# Patient Record
Sex: FEMALE | Race: WHITE | NOT HISPANIC OR LATINO | Employment: PART TIME | URBAN - NONMETROPOLITAN AREA
[De-identification: names, ages, dates, MRNs, and addresses within clinical notes are randomized per-mention and may not be internally consistent; named-entity substitution may affect disease eponyms.]

---

## 2017-01-17 ENCOUNTER — TELEPHONE (OUTPATIENT)
Dept: BEHAVIORAL HEALTH | Facility: OTHER | Age: 27
End: 2017-01-17

## 2017-01-20 ENCOUNTER — TELEPHONE (OUTPATIENT)
Dept: BEHAVIORAL HEALTH | Facility: OTHER | Age: 27
End: 2017-01-20

## 2017-01-20 NOTE — TELEPHONE ENCOUNTER
[facility]  @FLOW([id number])@      Social Work Care Navigator Note      Patient: Myrna Martinez  Date: January 20, 2017  Preferred Name: Myrna    Previous PHQ-9:   PHQ-9 SCORE 3/18/2016 4/29/2016 11/9/2016   Total Score 13 9 6     Previous NICHOLE-7:   NICHOLE-7 SCORE 11/9/2016   Total Score 9     RAMO LEVEL:  RAMO Score (Last Two) 11/10/2016   RAMO Raw Score 27   Activation Score 47.4   RAMO Level 2       Preferred Contact: @MARION([id number])@  Type of Contact Today: Phone call (patient reached)      Data: (subjective / Objective):  Patient Goals Areas: @FLOW([id number])@  Patient Stated Goals: @FLOW([id number])@  Recent ED/IP Admission or Discharge?   None  Patient Goals Areas: @FLOW([id number])@  Patient Stated Goals: @FLOW([id number])@  Recent ED/IP Admission or Discharge?   None    Objectives Addressed Today:  SO has been helping more, Patient has not called for childcare help yet- gave her the number again, patient will make appointment with PCP.    Current Stressors / Issues:  Working on moving toys upstairs    Intervention:  Motivational Interviewing: Expressed Empathy/Understanding, Supported Autonomy, Collaboration, Evocation and Open-ended questions   Target Behavior(s): Explored and resolved challenges to attending appointments as scheduled    Assessment: (Progress on Goals / Homework):  As above- patient will make appt with PCP and call [facility] re:childcare help and SNAP    Plan: (Homework, other):  Patient was encouraged to continue to seek condition-related information and education.      Scheduled a Phone follow up appointment with INA RIGGINS in 1 week     Patient has set self-identified goals and will monitor progress until the next appointment on: 01/26/17.     Sejal Hyman, Social Work Care Coordinator

## 2017-02-07 ENCOUNTER — TELEPHONE (OUTPATIENT)
Dept: BEHAVIORAL HEALTH | Facility: OTHER | Age: 27
End: 2017-02-07

## 2017-02-10 ENCOUNTER — OFFICE VISIT (OUTPATIENT)
Dept: BEHAVIORAL HEALTH | Facility: OTHER | Age: 27
End: 2017-02-10
Payer: COMMERCIAL

## 2017-02-10 DIAGNOSIS — F48.9 DEFERRED DIAGNOSIS ON AXIS I: Primary | ICD-10-CM

## 2017-02-10 NOTE — PROGRESS NOTES
"[facility]         Social Work Care Navigator Note      Patient: Myrna Martinez  Date: February 10, 2017  Preferred Name: Myrna    Previous PHQ-9:   PHQ-9 SCORE 3/18/2016 4/29/2016 11/9/2016   Total Score 13 9 6     Previous NICHOLE-7:   NICHOLE-7 SCORE 11/9/2016   Total Score 9     RAMO LEVEL:  RAMO Score (Last Two) 11/10/2016   RAMO Raw Score 27   Activation Score 47.4   RAMO Level 2       Preferred Contact: @[facility]([id number])@  Type of Contact Today: Face to Face in Clinic      Data: (subjective / Objective):  Patient Goals Areas: Goal Areas: Education  Patient Stated Goals: Patient stated goals: \" I need to know how to parent, it is hard for me to know if I am too strict.\"  Recent ED/IP Admission or Discharge?   None  Patient Goals Areas: Goal Areas: Education  Patient Stated Goals: Patient stated goals: \" I need to know how to parent, it is hard for me to know if I am too strict.\"  Recent ED/IP Admission or Discharge?   None    Objectives Addressed Today:  Increased anxiety, wanting parenting resources    Current Stressors / Issues:  Having a hard time keeping track of things    Intervention:  Motivational Interviewing: Expressed Empathy/Understanding and Supported Autonomy, Collaboration, Evocation   Target Behavior(s): Explored and resolved challenges to attending appointments as scheduled    Assessment: (Progress on Goals / Homework): reminded her to call:  Kerrie [phone], and gave information for ECFE in [place] for parenting skills    Plan: (Homework, other): MAke phone calls  Patient was encouraged to continue to seek condition-related information and education.      Scheduled a Phone follow up appointment with INA RIGGINS in 1 week     Patient has set self-identified goals and will monitor progress until the next appointment on: 02/17/17.     Sejal Hyman, Social Work Care Coordinator                   "

## 2017-02-16 ENCOUNTER — TELEPHONE (OUTPATIENT)
Dept: BEHAVIORAL HEALTH | Facility: OTHER | Age: 27
End: 2017-02-16

## 2017-02-24 ENCOUNTER — TELEPHONE (OUTPATIENT)
Dept: BEHAVIORAL HEALTH | Facility: OTHER | Age: 27
End: 2017-02-24

## 2017-03-02 ENCOUNTER — TELEPHONE (OUTPATIENT)
Dept: BEHAVIORAL HEALTH | Facility: OTHER | Age: 27
End: 2017-03-02

## 2017-03-02 NOTE — TELEPHONE ENCOUNTER
[facility]  @FLOW([id number])@      Social Work Care Navigator Note      Patient: Myrna Martinez  Date: March 2, 2017  Preferred Name: Myrna    Previous PHQ-9:   PHQ-9 SCORE 3/18/2016 4/29/2016 11/9/2016   Total Score 13 9 6     Previous NICHOLE-7:   NICHOLE-7 SCORE 11/9/2016   Total Score 9     RAMO LEVEL:  RAMO Score (Last Two) 11/10/2016   RAMO Raw Score 27   Activation Score 47.4   RAMO Level 2       Preferred Contact: @MARION([id number])@  Type of Contact Today: Phone call (patient reached)      Data: (subjective / Objective):  Patient Goals Areas: @FLOW([id number])@  Patient Stated Goals: @FLOW([id number])@  Recent ED/IP Admission or Discharge?   None  Patient Goals Areas: @FLOW([id number])@  Patient Stated Goals: @FLOW([id number])@  Recent ED/IP Admission or Discharge?   None    Objectives Addressed Today:  Job application, using planners, and keeping her mind set on self and son.     Current Stressors / Issues:     Waiting for result of interview    Intervention:  Motivational Interviewing: Expressed Empathy/Understanding and Supported Autonomy, Collaboration, Evocation   Target Behavior(s): Explored current social supports and reinforced opportunities to increase engagement    Assessment: (Progress on Goals / Homework):  Continue use of planners    Plan: (Homework, other):  Patient was encouraged to continue to seek condition-related information and education.      Scheduled a Phone follow up appointment with INA RIGGINS in 2 weeks     Patient has set self-identified goals and will monitor progress until the next appointment on: 03/16/17.     Sejal Hyman, Social Work Care Coordinator               Next 5 appointments (look out 90 days)     Mar 10, 2017  1:15 PM CST   (Arrive by 1:00 PM)   Return Visit with Nolvia Ramos PA-C   [facility] ([facility])    [address] [place] [address]   [phone]

## 2017-03-10 ENCOUNTER — OFFICE VISIT (OUTPATIENT)
Dept: OTOLARYNGOLOGY | Facility: OTHER | Age: 27
End: 2017-03-10
Attending: PHYSICIAN ASSISTANT
Payer: COMMERCIAL

## 2017-03-10 VITALS
TEMPERATURE: 98.3 F | SYSTOLIC BLOOD PRESSURE: 116 MMHG | DIASTOLIC BLOOD PRESSURE: 72 MMHG | HEIGHT: 62 IN | WEIGHT: 145 LBS | BODY MASS INDEX: 26.68 KG/M2 | HEART RATE: 94 BPM

## 2017-03-10 DIAGNOSIS — H69.93 DYSFUNCTION OF EUSTACHIAN TUBE, BILATERAL: ICD-10-CM

## 2017-03-10 DIAGNOSIS — K21.9 LPRD (LARYNGOPHARYNGEAL REFLUX DISEASE): ICD-10-CM

## 2017-03-10 DIAGNOSIS — J30.89 SEASONAL ALLERGIC RHINITIS DUE TO OTHER ALLERGIC TRIGGER: Primary | ICD-10-CM

## 2017-03-10 DIAGNOSIS — J34.3 NASAL TURBINATE HYPERTROPHY: ICD-10-CM

## 2017-03-10 PROCEDURE — 99212 OFFICE O/P EST SF 10 MIN: CPT

## 2017-03-10 PROCEDURE — 99213 OFFICE O/P EST LOW 20 MIN: CPT | Mod: 25 | Performed by: PHYSICIAN ASSISTANT

## 2017-03-10 PROCEDURE — 31575 DIAGNOSTIC LARYNGOSCOPY: CPT | Performed by: PHYSICIAN ASSISTANT

## 2017-03-10 PROCEDURE — 31575 DIAGNOSTIC LARYNGOSCOPY: CPT

## 2017-03-10 RX ORDER — FLUTICASONE PROPIONATE 50 MCG
2 SPRAY, SUSPENSION (ML) NASAL DAILY
Qty: 1 BOTTLE | Refills: 11 | Status: SHIPPED | OUTPATIENT
Start: 2017-03-10 | End: 2017-04-12

## 2017-03-10 RX ORDER — OMEPRAZOLE 40 MG/1
40 CAPSULE, DELAYED RELEASE ORAL DAILY
Qty: 30 CAPSULE | Refills: 1 | Status: SHIPPED | OUTPATIENT
Start: 2017-03-10 | End: 2017-04-12

## 2017-03-10 ASSESSMENT — PAIN SCALES - GENERAL: PAINLEVEL: NO PAIN (0)

## 2017-03-10 NOTE — MR AVS SNAPSHOT
After Visit Summary   3/10/2017    Myrna Martinez    MRN: [id number]           Patient Information     Date Of Birth          1990        Visit Information        Provider Department      3/10/2017 1:15 PM Nolvia Ramos PA-C [facility]        Today's Diagnoses     Seasonal allergic rhinitis due to other allergic trigger    -  1    Nasal turbinate hypertrophy        Dysfunction of eustachian tube, bilateral        LPRD (laryngopharyngeal reflux disease)          Care Instructions    Start Flonase 2 sprays to each nostril daily  Return for skin testing and follow up    Consider septoplasty and turbinate reduction     Increase water intake.   Follow LPR instructions  STart Prilosec 40 mg one tablet daily      If there are concerns or questions, Call [phone] and ask for nurse    Adult lifestyle changes to prevent LPR reviewed      Avoid eating and drinking within two to three hours prior to bedtime    Do not drink alcohol    Eat small meals and slowly    Limit problem foods:    o Caffeine  o Carbonated drinks  o Chocolate  o Peppermint  o Tomato  o Citrus fruits  o Fatty and fried foods      Lose weight    Quit smoking    Wear loose clothing          Follow-ups after your visit        Who to contact     If you have questions or need follow up information about today's clinic visit or your schedule please contact [facility] directly at [phone].  Normal or non-critical lab and imaging results will be communicated to you by MyChart, letter or phone within 4 business days after the clinic has received the results. If you do not hear from us within 7 days, please contact the clinic through MyChart or phone. If you have a critical or abnormal lab result, we will notify you by phone as soon as possible.  Submit refill requests through [employer] or call your pharmacy and they will forward the refill request to us. Please allow 3 business days for your refill to be completed.  "         Additional Information About Your Visit        MyChart Information     [employer] lets you send messages to your doctor, view your test results, renew your prescriptions, schedule appointments and more. To sign up, go to [email or web address] . Click on \"Log in\" on the left side of the screen, which will take you to the Welcome page. Then click on \"Sign up Now\" on the right side of the page.     You will be asked to enter the access code listed below, as well as some personal information. Please follow the directions to create your username and password.     Your access code is: [email or web address]  Expires: 2017  1:38 PM     Your access code will  in 90 days. If you need help or a new code, please call your [facility] clinic or [phone].        Care EveryWhere ID     This is your Care EveryWhere ID. This could be used by other organizations to access your [facility] medical records  [id number]        Your Vitals Were     Pulse Temperature Height BMI (Body Mass Index)          94 98.3  F (36.8  C) (Tympanic) 5' 1.75\" (1.568 m) 26.74 kg/m2         Blood Pressure from Last 3 Encounters:   03/10/17 116/72   16 121/70   16 109/65    Weight from Last 3 Encounters:   03/10/17 145 lb (65.8 kg)   16 145 lb (65.8 kg)   16 154 lb (69.9 kg)              Today, you had the following     No orders found for display         Today's Medication Changes          These changes are accurate as of: 3/10/17  1:38 PM.  If you have any questions, ask your nurse or doctor.               Start taking these medicines.        Dose/Directions    fluticasone 50 MCG/ACT spray   Commonly known as:  FLONASE   Used for:  Dysfunction of eustachian tube, bilateral, Nasal turbinate hypertrophy, Seasonal allergic rhinitis due to other allergic trigger   Started by:  Nolvia Ramos PA-C        Dose:  2 spray   Spray 2 sprays into both nostrils daily   Quantity:  1 Bottle   Refills:  11       omeprazole 40 MG capsule "   Commonly known as:  priLOSEC   Used for:  LPRD (laryngopharyngeal reflux disease)   Started by:  Nolvia Ramos PA-C        Dose:  40 mg   Take 1 capsule (40 mg) by mouth daily Take 30-60 minutes before a meal.   Quantity:  30 capsule   Refills:  1            Where to get your medicines      These medications were sent to [id number] - [place], [place] - [address] AT [facility] [address], [place] [address]     Phone:  [phone]     fluticasone 50 MCG/ACT spray    omeprazole 40 MG capsule                Primary Care Provider Office Phone # Fax #    Nivia Jimenes [phone]       [facility] [place] [address]  [place] [address]        Thank you!     Thank you for choosing [facility]  for your care. Our goal is always to provide you with excellent care. Hearing back from our patients is one way we can continue to improve our services. Please take a few minutes to complete the written survey that you may receive in the mail after your visit with us. Thank you!             Your Updated Medication List - Protect others around you: Learn how to safely use, store and throw away your medicines at www.disposemymeds.org.          This list is accurate as of: 3/10/17  1:38 PM.  Always use your most recent med list.                   Brand Name Dispense Instructions for use    etonogestrel 68 MG Impl    IMPLANON/NEXPLANON     1 each by Subdermal route once       fluticasone 50 MCG/ACT spray    FLONASE    1 Bottle    Spray 2 sprays into both nostrils daily       omeprazole 40 MG capsule    priLOSEC    30 capsule    Take 1 capsule (40 mg) by mouth daily Take 30-60 minutes before a meal.       ranitidine 300 MG tablet    ZANTAC    30 tablet    Take 1 tablet (300 mg) by mouth At Bedtime       sertraline 50 MG tablet    ZOLOFT    30 tablet    Take 1 tablet (50 mg) by mouth daily

## 2017-03-10 NOTE — NURSING NOTE
"Chief Complaint   Patient presents with     Allergies     Follow Up Seasonal Allergic Rhinitis; the patient wants to be allergy tested       Initial /72 (Cuff Size: Adult Regular)  Pulse 94  Temp 98.3  F (36.8  C) (Tympanic)  Ht 5' 1.75\" (1.568 m)  Wt 145 lb (65.8 kg)  BMI 26.74 kg/m2 Estimated body mass index is 26.74 kg/(m^2) as calculated from the following:    Height as of this encounter: 5' 1.75\" (1.568 m).    Weight as of this encounter: 145 lb (65.8 kg).  Medication Reconciliation: complete   Sandee Villalobos      "

## 2017-03-10 NOTE — PROGRESS NOTES
Chief Complaint   Patient presents with     Allergies     Follow Up Seasonal Allergic Rhinitis; the patient wants to be allergy tested       Patient returns to get set up for MQT  She has sinus concerns and reports hx for past few years. She feels like everything is swollen in nose, and post nasal drainage and coughing drainage.   No nasal trauma or injury to nose. She has mild facial pain/ pressure in left side which started one day prior.   Myrna has no mary concerns with allergies. She has tried Zyrtec, and another allergy tablet. She was prescribed a nasal spray, but did not try use.   She had Sinus CT completed which was negative.   No allergy testing. Resides in a house with an unfinished basement. There is no water or mold. She has carpet in bedroom. She has on kitten at home (new).   No known family hx of allergies.     She has noticed increase in throat clearing. Daily heartburn. She has minimal water intake.       Past Medical History   Diagnosis Date     Anxiety      Chronic headache      Depression      [facility], was homeless at the time     Herpes genitalis in women      takes acyclovir     Seasonal allergies       No Known Allergies  Current Outpatient Prescriptions   Medication     ranitidine (ZANTAC) 300 MG tablet     cyclobenzaprine (FLEXERIL) 5 MG tablet     sertraline (ZOLOFT) 50 MG tablet     [DISCONTINUED] ValACYclovir HCl (VALTREX PO)     etonogestrel (IMPLANON/NEXPLANON) 68 MG IMPL     No current facility-administered medications for this visit.       ROS: 10 point ROS neg other than the symptoms noted above in the HPI.  General Appearance: healthy, alert, active and no distress  Head: Normocephalic. No masses, lesions, tenderness or abnormalities  Eyes: conjuctiva clear, PERRL, EOM intact  Ears: External ears normal. Canals clear. TM's normal. Left w/ pars tensa retraction.   Nose: Nares normal  Mouth: normal  Neck: Supple, no cervical adenopathy, no thyromegaly, Full range  of motion in all planes        The lips appear normal and without lesion. The dentition is in good condition The patient has a normal appearing and functioning hard and soft palate without bifid uvula or submucosal cleft. The tongue is normal in appearance without erosive lesion or fungating mass. The oral mucosa is soft and normal in appearance. The patient also has a soft floor of mouth and base of tongue. The tonsils are symmetric.      Flexible Endoscopy -     Attempts at mirror laryngoscopy were not possible due to gag reflex.  Therefore I proceeded with a fiberoptic examination.  First I sprayed both sides of the nose with a mixture of lidocaine and neosynephrine.  I then passed the scope through the nasal cavity. The nasal cavity was unremarkable.  DNS with Right TH. The nasopharynx was mucosally covered and symmetric.  The Eustachian tube openings were unobstructed.              ASSESSMENT:    ICD-10-CM    1. Seasonal allergic rhinitis due to other allergic trigger J30.89 fluticasone (FLONASE) 50 MCG/ACT spray   2. Nasal turbinate hypertrophy J34.3 fluticasone (FLONASE) 50 MCG/ACT spray   3. Dysfunction of eustachian tube, bilateral H69.83 fluticasone (FLONASE) 50 MCG/ACT spray   4. LPRD (laryngopharyngeal reflux disease) J38.7 omeprazole (PRILOSEC) 40 MG capsule   Start Flonase 2 sprays to each nostril daily  Return for skin testing and follow up    Consider septoplasty and turbinate reduction     Increase water intake.   Follow LPR instructions  STart Prilosec 40 mg one tablet daily        She had complete audiogram within normal range (June 2016)  Sinus CT was completed June 2016- overall negative, minimal thickening.     She did not complete MQT     Allergen avoidance measures were discussed and are important in preventing symptoms from occurring or worsening.   Follow up for allergy testing as recommended. This may be a blood test (mRAST) or skin testing ( modified quantitative testing).   Indications  for allergy testing include:   1) Confirm suspicion of allergic rhinitis due to inhalant allergies   2) Identify the offending allergen to determine specific mode of treatment   3) In the case of chronic rhinosinusitis: when symptoms are not controlled by avoidance and pharmacotherapy   4) In the Asthma patient when exacerbations may be due to perennial allergen exposure   5) Suspect food allergy   6) Otitis Media, chronic rhinitis, atopic dermatitis, Meniere disease, headache, pharyngitis or eye symptoms   Signed consent was obtained, and the risks of immunotherapy were discussed, including the potential for anaphylaxis.        Adult lifestyle changes to prevent LPR reviewed      Avoid eating and drinking within two to three hours prior to bedtime    Do not drink alcohol    Eat small meals and slowly    Limit problem foods:    o Caffeine  o Carbonated drinks  o Chocolate  o Peppermint  o Tomato  o Citrus fruits  o Fatty and fried foods      Lose weight    Quit smoking    Wear loose clothing    Thank you for allowing me to participate in the care of your patient.         Nolvia Ramos PA-C  ENT  [facility], [place]  [phone]

## 2017-03-10 NOTE — PATIENT INSTRUCTIONS
Start Flonase 2 sprays to each nostril daily  Return for skin testing and follow up    Consider septoplasty and turbinate reduction     Increase water intake.   Follow LPR instructions  STart Prilosec 40 mg one tablet daily      If there are concerns or questions, Call [phone] and ask for nurse    Adult lifestyle changes to prevent LPR reviewed      Avoid eating and drinking within two to three hours prior to bedtime    Do not drink alcohol    Eat small meals and slowly    Limit problem foods:    o Caffeine  o Carbonated drinks  o Chocolate  o Peppermint  o Tomato  o Citrus fruits  o Fatty and fried foods      Lose weight    Quit smoking    Wear loose clothing

## 2017-03-13 ENCOUNTER — TELEPHONE (OUTPATIENT)
Dept: ALLERGY | Facility: OTHER | Age: 27
End: 2017-03-13

## 2017-03-13 NOTE — TELEPHONE ENCOUNTER
I spoke with the patient today regarding allergy skin testing.  All general instructions are reviewed with the patient.  She is aware of all medications that need to be held prior to testing.  She will stop medications as directed per instructions.  She verbalized understanding and agree with plan.      An appointment will be arranged by the ENT [facility] for testing date and time. This message is forwarded to the [facility] to arrange for an appointment. Written instructions are mailed by the ENT [facility].  Sherrie Giraldo RN

## 2017-03-16 ENCOUNTER — TELEPHONE (OUTPATIENT)
Dept: BEHAVIORAL HEALTH | Facility: OTHER | Age: 27
End: 2017-03-16

## 2017-03-16 NOTE — TELEPHONE ENCOUNTER
[facility]  @FLOW([id number])@      Social Work Care Navigator Note      Patient: Myrna Martinez  Date: March 16, 2017  Preferred Name: Myrna    Previous PHQ-9:   PHQ-9 SCORE 3/18/2016 4/29/2016 11/9/2016   Total Score 13 9 6     Previous NICHOLE-7:   NICHOLE-7 SCORE 11/9/2016   Total Score 9     RAMO LEVEL:  RAMO Score (Last Two) 11/10/2016   RAMO Raw Score 27   Activation Score 47.4   RAMO Level 2       Preferred Contact: @MARION([id number])@  Type of Contact Today: Phone call (patient reached)      Data: (subjective / Objective):  Patient Goals Areas: @FLOW([id number])@  Patient Stated Goals: @FLOW([id number])@  Recent ED/IP Admission or Discharge?   None  Patient Goals Areas: @FLOW([id number])@  Patient Stated Goals: @FLOW([id number])@  Recent ED/IP Admission or Discharge?   None    Objectives Addressed Today:  Grandmother was in ICU  Did not get job  Has not inquired about early childhood education yet.     Current Stressors / Issues:  Grandmother in ICU    Intervention:  Motivational Interviewing: Expressed Empathy/Understanding, Supported Autonomy, Collaboration, Evocation, Open-ended questions and Reframe   Target Behavior(s): Explored thoughts and readiness to participate in individual therapy    Assessment: (Progress on Goals / Homework): Planner is helpful, and will continue to use it.      Plan: (Homework, other):Inquire about ECFE- call in 2 wks  Patient was encouraged to continue to seek condition-related information and education.      Scheduled a Phone follow up appointment with INA RIGGINS in 2 weeks     Patient has set self-identified goals and will monitor progress until the next appointment on: 03/30/17.   Sejal Hyman, Social Work Care Coordinator               Next 5 appointments (look out 90 days)     Mar 23, 2017  1:30 PM CDT   Office Visit with HC ALLERGY TESTING   [facility] ([facility])    [address] [place] [address]   [phone]            Mar 23, 2017  3:45 PM  CDT   (Arrive by 3:30 PM)   Return Visit with Nolvia Ramos PA-C   [facility] ([facility])    [address] [place] [address]   [phone]

## 2017-03-23 ENCOUNTER — OFFICE VISIT (OUTPATIENT)
Dept: OTOLARYNGOLOGY | Facility: OTHER | Age: 27
End: 2017-03-23
Attending: PHYSICIAN ASSISTANT
Payer: COMMERCIAL

## 2017-03-23 ENCOUNTER — OFFICE VISIT (OUTPATIENT)
Dept: ALLERGY | Facility: OTHER | Age: 27
End: 2017-03-23
Attending: PHYSICIAN ASSISTANT
Payer: COMMERCIAL

## 2017-03-23 VITALS
SYSTOLIC BLOOD PRESSURE: 119 MMHG | TEMPERATURE: 99.1 F | WEIGHT: 153 LBS | BODY MASS INDEX: 28.16 KG/M2 | DIASTOLIC BLOOD PRESSURE: 81 MMHG | HEART RATE: 80 BPM | HEIGHT: 62 IN | OXYGEN SATURATION: 100 %

## 2017-03-23 DIAGNOSIS — J30.2 SEASONAL ALLERGIC RHINITIS, UNSPECIFIED ALLERGIC RHINITIS TRIGGER: Primary | ICD-10-CM

## 2017-03-23 DIAGNOSIS — J34.3 NASAL TURBINATE HYPERTROPHY: ICD-10-CM

## 2017-03-23 DIAGNOSIS — H69.93 DYSFUNCTION OF EUSTACHIAN TUBE, BILATERAL: ICD-10-CM

## 2017-03-23 DIAGNOSIS — J30.2 SEASONAL ALLERGIC RHINITIS: Primary | ICD-10-CM

## 2017-03-23 DIAGNOSIS — K21.9 LPRD (LARYNGOPHARYNGEAL REFLUX DISEASE): ICD-10-CM

## 2017-03-23 PROCEDURE — 99213 OFFICE O/P EST LOW 20 MIN: CPT | Performed by: PHYSICIAN ASSISTANT

## 2017-03-23 PROCEDURE — 95004 PERQ TESTS W/ALRGNC XTRCS: CPT | Mod: TC

## 2017-03-23 PROCEDURE — 99212 OFFICE O/P EST SF 10 MIN: CPT | Mod: 25

## 2017-03-23 PROCEDURE — 95024 IQ TESTS W/ALLERGENIC XTRCS: CPT | Mod: TC

## 2017-03-23 RX ORDER — FEXOFENADINE HCL 180 MG/1
180 TABLET ORAL DAILY
Qty: 30 TABLET | Refills: 1 | Status: SHIPPED | OUTPATIENT
Start: 2017-03-23 | End: 2017-04-12

## 2017-03-23 ASSESSMENT — PAIN SCALES - GENERAL: PAINLEVEL: MILD PAIN (2)

## 2017-03-23 NOTE — MR AVS SNAPSHOT
After Visit Summary   3/23/2017    Myrna Martinez    MRN: [id number]           Patient Information     Date Of Birth          1990        Visit Information        Provider Department      3/23/2017 3:30 PM Nolvia Ramos PA-C [facility]        Today's Diagnoses     Seasonal allergic rhinitis, unspecified allergic rhinitis trigger    -  1      Care Instructions    Start Flonase 2 sprays to each nostril daily  Start Allegra one tablet daily or as needed     Consider septoplasty and turbinate reduction      Increase water intake.   Follow LPR instructions  STart Prilosec 40 mg one tablet daily    Follow up in 2 months         If there are concerns or questions, Call [phone] and ask for nurse     Adult lifestyle changes to prevent LPR reviewed      Avoid eating and drinking within two to three hours prior to bedtime    Do not drink alcohol    Eat small meals and slowly    Limit problem foods:    o Caffeine  o Carbonated drinks  o Chocolate  o Peppermint  o Tomato  o Citrus fruits  o Fatty and fried foods      Lose weight    Quit smoking    Wear loose clothing        Follow-ups after your visit        Follow-up notes from your care team     Return in about 2 months (around 5/23/2017).      Who to contact     If you have questions or need follow up information about today's clinic visit or your schedule please contact [facility] directly at [phone].  Normal or non-critical lab and imaging results will be communicated to you by MyChart, letter or phone within 4 business days after the clinic has received the results. If you do not hear from us within 7 days, please contact the clinic through MyChart or phone. If you have a critical or abnormal lab result, we will notify you by phone as soon as possible.  Submit refill requests through [employer] or call your pharmacy and they will forward the refill request to us. Please allow 3 business days for your refill to be  "completed.          Additional Information About Your Visit        [employer] Information     [employer] lets you send messages to your doctor, view your test results, renew your prescriptions, schedule appointments and more. To sign up, go to [email or web address] . Click on \"Log in\" on the left side of the screen, which will take you to the Welcome page. Then click on \"Sign up Now\" on the right side of the page.     You will be asked to enter the access code listed below, as well as some personal information. Please follow the directions to create your username and password.     Your access code is: [email or web address]  Expires: 2017  2:38 PM     Your access code will  in 90 days. If you need help or a new code, please call your [place] clinic or [phone].        Care EveryWhere ID     This is your Care EveryWhere ID. This could be used by other organizations to access your [place] medical records  [id number]        Your Vitals Were     Pulse Temperature Height BMI (Body Mass Index)          70 99.1  F (37.3  C) (Tympanic) 5' 2\" (1.575 m) 27.98 kg/m2         Blood Pressure from Last 3 Encounters:   17 123/67   03/10/17 116/72   16 121/70    Weight from Last 3 Encounters:   17 153 lb (69.4 kg)   03/10/17 145 lb (65.8 kg)   16 145 lb (65.8 kg)              Today, you had the following     No orders found for display         Today's Medication Changes          These changes are accurate as of: 3/23/17  3:43 PM.  If you have any questions, ask your nurse or doctor.               Start taking these medicines.        Dose/Directions    fexofenadine 180 MG tablet   Commonly known as:  ALLEGRA   Used for:  Seasonal allergic rhinitis, unspecified allergic rhinitis trigger   Started by:  Nolvia Ramos PA-C        Dose:  180 mg   Take 1 tablet (180 mg) by mouth daily   Quantity:  30 tablet   Refills:  1            Where to get your medicines      These medications were sent to [id number] - [place], [place] - [address] AT [facility] [address], [place] [address]     Phone:  [phone]     fexofenadine 180 MG tablet                Primary Care Provider Office Phone # Fax #    Nivia Jimenes [phone]       [facility] [place] [address]  [place] [address]        Thank you!     Thank you for choosing [facility]  for your care. Our goal is always to provide you with excellent care. Hearing back from our patients is one way we can continue to improve our services. Please take a few minutes to complete the written survey that you may receive in the mail after your visit with us. Thank you!             Your Updated Medication List - Protect others around you: Learn how to safely use, store and throw away your medicines at www.disposemymeds.org.          This list is accurate as of: 3/23/17  3:43 PM.  Always use your most recent med list.                   Brand Name Dispense Instructions for use    etonogestrel 68 MG Impl    IMPLANON/NEXPLANON     1 each by Subdermal route once       fexofenadine 180 MG tablet    ALLEGRA    30 tablet    Take 1 tablet (180 mg) by mouth daily       fluticasone 50 MCG/ACT spray    FLONASE    1 Bottle    Spray 2 sprays into both nostrils daily       omeprazole 40 MG capsule    priLOSEC    30 capsule    Take 1 capsule (40 mg) by mouth daily Take 30-60 minutes before a meal.       ranitidine 300 MG tablet    ZANTAC    30 tablet    Take 1 tablet (300 mg) by mouth At Bedtime       sertraline 50 MG tablet    ZOLOFT    30 tablet    Take 1 tablet (50 mg) by mouth daily

## 2017-03-23 NOTE — PATIENT INSTRUCTIONS
Start Flonase 2 sprays to each nostril daily  Start Allegra one tablet daily or as needed     Consider septoplasty and turbinate reduction      Increase water intake.   Follow LPR instructions  STart Prilosec 40 mg one tablet daily    Follow up in 2 months         If there are concerns or questions, Call [phone] and ask for nurse     Adult lifestyle changes to prevent LPR reviewed      Avoid eating and drinking within two to three hours prior to bedtime    Do not drink alcohol    Eat small meals and slowly    Limit problem foods:    o Caffeine  o Carbonated drinks  o Chocolate  o Peppermint  o Tomato  o Citrus fruits  o Fatty and fried foods      Lose weight    Quit smoking    Wear loose clothing

## 2017-03-23 NOTE — NURSING NOTE
"Chief Complaint   Patient presents with     other     MQT allergy skin testing       Initial /67 (BP Location: Right arm, Patient Position: Chair, Cuff Size: Adult Regular)  Pulse 70  Temp 99.1  F (37.3  C) (Tympanic)  Ht 5' 2\" (1.575 m)  Wt 153 lb (69.4 kg)  BMI 27.98 kg/m2 Estimated body mass index is 27.98 kg/(m^2) as calculated from the following:    Height as of this encounter: 5' 2\" (1.575 m).    Weight as of this encounter: 153 lb (69.4 kg).  Medication Reconciliation: complete     The patient presents for allergy skin testing.  She was scheduled to be tested several months ago, but had not stopped necessary medications.  So, her test was rescheduled to today.    Symptoms have included chronic post nasal drainage, facial pressure and pain, ears feeling plugged, chronic throat clearing.  Symptoms are worse during the winter season.      The patient lives in a single family home, witha basement.  The patient does suspect mold, water or moisture issues  in the home.  The home is located in [place] and is \"very old\"  There is carpet in the home, including in the bedroom.  There is forced air gas heat, and central air conditioning.       The patient has one cat that sleeps in the bedroom with her, but is not in the bed.      The patient denies allergy testing in the past.    All of the patients medications are reviewed prior to testing.  All appropriate medications have been stopped.         Consent is signed by the patient and signature is verified.    MQT/ID test is performed per protocol.  The patient became faint feeling just before the final 4 intradermal injections were given.  She  was placed on the floor.  She had some initial feeling of nausea that subsided when placed on the floor.  O2 was started at 3 liters per nasal cannula.  2 chewable Claritin were given to her.  ANH Lezama was called to the room and the patient was examined.  VS remained stable and are documented in the VS tabs. The " patient notes a fear of needles, and states she has become faint other times when she is given injections.     Due to her reaction, Helminthasporium intradermal was not done.  Cat, dog and dust intradermals were completed while the patient was laying down.   All symptoms resolved without further incident.  The patient had no further symptoms at the end of testing.      All findings are recorded on the paper flow sheet. Results are reviewed with the patient.  They are given written information regarding allergy.    The patient will follow-up with ANH Lezama for treatment plan.      Sherrie Giraldo RN

## 2017-03-23 NOTE — PROGRESS NOTES
"Chief Complaint   Patient presents with     other     MQT allergy skin testing       MQT  Dilution #5: Dust, Ragweed, lolita.   She did have a vasovagal symptoms during visit. However, tolerated well.   Past Medical History:   Diagnosis Date     Anxiety      Chronic headache      Depression     [facility], was homeless at the time     Herpes genitalis in women     takes acyclovir     Seasonal allergies       No Known Allergies  Current Outpatient Prescriptions   Medication     etonogestrel (IMPLANON/NEXPLANON) 68 MG IMPL     fluticasone (FLONASE) 50 MCG/ACT spray     omeprazole (PRILOSEC) 40 MG capsule     ranitidine (ZANTAC) 300 MG tablet     sertraline (ZOLOFT) 50 MG tablet     [DISCONTINUED] ValACYclovir HCl (VALTREX PO)     No current facility-administered medications for this visit.       ROS: 10 point ROS neg other than the symptoms noted above in the HPI.  /67 (BP Location: Right arm, Patient Position: Chair, Cuff Size: Adult Regular)  Pulse 70  Temp 99.1  F (37.3  C) (Tympanic)  Ht 5' 2\" (1.575 m)  Wt 153 lb (69.4 kg)  BMI 27.98 kg/m2  General Appearance: healthy, alert, active and no distress  Head: Normocephalic. No masses, lesions, tenderness or abnormalities  Eyes: conjuctiva clear, PERRL, EOM intact  Ears: External ears normal. Canals clear. TM's normal. Left w/ pars tensa retraction.   Nose: Nares normal  Mouth: normal  Neck: Supple, no cervical adenopathy, no thyromegaly, Full range of motion in all planes    Heart Regular rate  Lungs: clear anterior and posterior.     ASSESSMENT:    ICD-10-CM    1. Seasonal allergic rhinitis, unspecified allergic rhinitis trigger J30.2 fexofenadine (ALLEGRA) 180 MG tablet   2. Nasal turbinate hypertrophy J34.3    3. Dysfunction of eustachian tube, bilateral H69.83    4. LPRD (laryngopharyngeal reflux disease) J38.7        Start Flonase 2 sprays to each nostril daily  Start Allegra daily     Consider septoplasty and turbinate reduction      Increase " water intake.   Follow LPR instructions  STart Prilosec 40 mg one tablet daily      Return for recheck of LPR in 2 months     She had complete audiogram within normal range (June 2016)  Sinus CT was completed June 2016- overall negative, minimal thickening.     Adult lifestyle changes to prevent LPR reviewed      Avoid eating and drinking within two to three hours prior to bedtime    Do not drink alcohol    Eat small meals and slowly    Limit problem foods:    o Caffeine  o Carbonated drinks  o Chocolate  o Peppermint  o Tomato  o Citrus fruits  o Fatty and fried foods      Lose weight    Quit smoking    Wear loose clothing    Nolvia Ramos PA-C  ENT  [facility], [place]  [phone]

## 2017-03-23 NOTE — PROGRESS NOTES
"The patient presents for allergy skin testing.  She was scheduled to be tested several months ago, but had not stopped necessary medications.  So, her test was rescheduled to today.    Symptoms have included chronic post nasal drainage, facial pressure and pain, ears feeling plugged, chronic throat clearing.  Symptoms are worse during the winter season.      The [facility]  The patient does suspect mold, water or moisture issues  in the home.  The home is located in [place] and is \"very old\"  There is carpet in the home, including in the bedroom.  There is forced air gas heat, and central air conditioning.       The patient has one cat that sleeps in the bedroom with her, but is not in the bed.      The patient denies allergy testing in the past.    All of the patients medications are reviewed prior to testing.  All appropriate medications have been stopped.         Consent is signed by the patient and signature is verified.    MQT/ID test is performed per protocol.  The patient became faint feeling just before the final 4 intradermal injections were given.  She  was placed on the floor.  She had some initial feeling of nausea that subsided when placed on the floor.  O2 was started at 3 liters per nasal cannula.  2 chewable Claritin were given to her.  ANH Lezama was called to the room and the patient was examined.  VS remained stable and are documented in the VS tabs. The patient notes a fear of needles, and states she has become faint other times when she is given injections.     Due to her reaction, Helminthasporium intradermal was not done.  Cat, dog and dust intradermals were completed while the patient was laying down.   All symptoms resolved without further incident.  The patient had no further symptoms at the end of testing.      All findings are recorded on the paper flow sheet. Results are reviewed with the patient.  They are given written information " regarding allergy.    The patient will follow-up with ANH Lezama for treatment plan.          Sherrie Giraldo RN

## 2017-03-23 NOTE — MR AVS SNAPSHOT
"              After Visit Summary   3/23/2017    Myrna Martinez    MRN: [id number]           Patient Information     Date Of Birth          1990        Visit Information        Provider Department      3/23/2017 1:30 PM HC ALLERGY TESTING [facility]        Today's Diagnoses     Seasonal allergic rhinitis    -  1       Follow-ups after your visit        Who to contact     If you have questions or need follow up information about today's clinic visit or your schedule [facility] at [phone].  Normal or non-critical lab and imaging results will be communicated to you by MyChart, letter or phone within 4 business days after the clinic has received the results. If you do not hear from us within 7 days, please contact the clinic through [employer] or phone. If you have a critical or abnormal lab result, we will notify you by phone as soon as possible.  Submit refill requests through [employer] or call your pharmacy and they will forward the refill request to us. Please allow 3 business days for your refill to be completed.          Additional Information About Your Visit        MyChart Information     [employer] lets you send messages to your doctor, view your test results, renew your prescriptions, schedule appointments and more. To sign up, go to [email or web address] . Click on \"Log in\" on the left side of the screen, which will take you to the Welcome page. Then click on \"Sign up Now\" on the right side of the page.     You will be asked to enter the access code listed below, as well as some personal information. Please follow the directions to create your username and password.     Your access code is: [email or web address]  Expires: 2017  2:38 PM     Your access code will  in 90 days. If you need help or a new code, please call your [facility] or [phone].        Care EveryWhere ID     This is your Care EveryWhere ID. This could be used by other " organizations to access your [place] medical records  [id number]         Blood Pressure from Last 3 Encounters:   03/23/17 123/67   03/10/17 116/72   11/09/16 121/70    Weight from Last 3 Encounters:   03/23/17 153 lb (69.4 kg)   03/10/17 145 lb (65.8 kg)   11/09/16 145 lb (65.8 kg)              Today, you had the following     No orders found for display         Today's Medication Changes          These changes are accurate as of: 3/23/17  4:26 PM.  If you have any questions, ask your nurse or doctor.               Start taking these medicines.        Dose/Directions    fexofenadine 180 MG tablet   Commonly known as:  ALLEGRA   Used for:  Seasonal allergic rhinitis, unspecified allergic rhinitis trigger   Started by:  Nolvia Ramos PA-C        Dose:  180 mg   Take 1 tablet (180 mg) by mouth daily   Quantity:  30 tablet   Refills:  1            Where to get your medicines      These medications were sent to [facility], [place] - [address] AT [facility] [address], [place] [address]     Phone:  [phone]     fexofenadine 180 MG tablet                Primary Care Provider Office Phone # Fax #    Nivia Jimenes [phone]       [facility] [address]  [place] [address]        Thank you!     Thank you for choosing [facility]  for your care. Our goal is always to provide you with excellent care. Hearing back from our patients is one way we can continue to improve our services. Please take a few minutes to complete the written survey that you may receive in the mail after your visit with us. Thank you!             Your Updated Medication List - Protect others around you: Learn how to safely use, store and throw away your medicines at www.disposemymeds.org.          This list is accurate as of: 3/23/17  4:26 PM.  Always use your most recent med list.                   Brand Name Dispense Instructions for use     etonogestrel 68 MG Impl    IMPLANON/NEXPLANON     1 each by Subdermal route once       fexofenadine 180 MG tablet    ALLEGRA    30 tablet    Take 1 tablet (180 mg) by mouth daily       fluticasone 50 MCG/ACT spray    FLONASE    1 Bottle    Spray 2 sprays into both nostrils daily       omeprazole 40 MG capsule    priLOSEC    30 capsule    Take 1 capsule (40 mg) by mouth daily Take 30-60 minutes before a meal.       ranitidine 300 MG tablet    ZANTAC    30 tablet    Take 1 tablet (300 mg) by mouth At Bedtime       sertraline 50 MG tablet    ZOLOFT    30 tablet    Take 1 tablet (50 mg) by mouth daily

## 2017-04-12 ENCOUNTER — OFFICE VISIT (OUTPATIENT)
Dept: BEHAVIORAL HEALTH | Facility: OTHER | Age: 27
End: 2017-04-12
Attending: SOCIAL WORKER
Payer: COMMERCIAL

## 2017-04-12 ENCOUNTER — OFFICE VISIT (OUTPATIENT)
Dept: FAMILY MEDICINE | Facility: OTHER | Age: 27
End: 2017-04-12
Attending: FAMILY MEDICINE
Payer: COMMERCIAL

## 2017-04-12 VITALS
HEIGHT: 62 IN | HEART RATE: 64 BPM | BODY MASS INDEX: 27.97 KG/M2 | SYSTOLIC BLOOD PRESSURE: 118 MMHG | DIASTOLIC BLOOD PRESSURE: 62 MMHG | TEMPERATURE: 98.5 F | WEIGHT: 152 LBS

## 2017-04-12 DIAGNOSIS — K21.9 GASTROESOPHAGEAL REFLUX DISEASE WITHOUT ESOPHAGITIS: ICD-10-CM

## 2017-04-12 DIAGNOSIS — R11.0 NAUSEA: Primary | ICD-10-CM

## 2017-04-12 DIAGNOSIS — F41.1 GAD (GENERALIZED ANXIETY DISORDER): Primary | ICD-10-CM

## 2017-04-12 DIAGNOSIS — F41.1 GAD (GENERALIZED ANXIETY DISORDER): ICD-10-CM

## 2017-04-12 DIAGNOSIS — Z63.0 RELATIONSHIP PROBLEM BETWEEN PARTNERS: ICD-10-CM

## 2017-04-12 DIAGNOSIS — F33.1 MAJOR DEPRESSIVE DISORDER, RECURRENT EPISODE, MODERATE (H): ICD-10-CM

## 2017-04-12 DIAGNOSIS — R45.86 MOOD SWINGS: ICD-10-CM

## 2017-04-12 LAB — HCG UR QL: NEGATIVE

## 2017-04-12 PROCEDURE — 99212 OFFICE O/P EST SF 10 MIN: CPT

## 2017-04-12 PROCEDURE — 99214 OFFICE O/P EST MOD 30 MIN: CPT | Performed by: FAMILY MEDICINE

## 2017-04-12 PROCEDURE — 36415 COLL VENOUS BLD VENIPUNCTURE: CPT | Performed by: FAMILY MEDICINE

## 2017-04-12 PROCEDURE — 81025 URINE PREGNANCY TEST: CPT | Performed by: FAMILY MEDICINE

## 2017-04-12 PROCEDURE — 85027 COMPLETE CBC AUTOMATED: CPT | Performed by: FAMILY MEDICINE

## 2017-04-12 PROCEDURE — 90840 PSYTX CRISIS EA ADDL 30 MIN: CPT | Performed by: SOCIAL WORKER

## 2017-04-12 PROCEDURE — 90839 PSYTX CRISIS INITIAL 60 MIN: CPT | Mod: U2 | Performed by: SOCIAL WORKER

## 2017-04-12 PROCEDURE — 84443 ASSAY THYROID STIM HORMONE: CPT | Performed by: FAMILY MEDICINE

## 2017-04-12 RX ORDER — PRENATAL VIT/IRON FUM/FOLIC AC 27MG-0.8MG
1 TABLET ORAL DAILY
Qty: 100 TABLET | Refills: 3 | Status: SHIPPED | OUTPATIENT
Start: 2017-04-12 | End: 2018-05-10

## 2017-04-12 RX ORDER — HYDROXYZINE PAMOATE 25 MG/1
25 CAPSULE ORAL 3 TIMES DAILY PRN
Qty: 40 CAPSULE | Refills: 1 | Status: SHIPPED | OUTPATIENT
Start: 2017-04-12 | End: 2017-05-23

## 2017-04-12 SDOH — SOCIAL STABILITY - SOCIAL INSECURITY: PROBLEMS IN RELATIONSHIP WITH SPOUSE OR PARTNER: Z63.0

## 2017-04-12 ASSESSMENT — ANXIETY QUESTIONNAIRES
7. FEELING AFRAID AS IF SOMETHING AWFUL MIGHT HAPPEN: MORE THAN HALF THE DAYS
1. FEELING NERVOUS, ANXIOUS, OR ON EDGE: NEARLY EVERY DAY
3. WORRYING TOO MUCH ABOUT DIFFERENT THINGS: NEARLY EVERY DAY
IF YOU CHECKED OFF ANY PROBLEMS ON THIS QUESTIONNAIRE, HOW DIFFICULT HAVE THESE PROBLEMS MADE IT FOR YOU TO DO YOUR WORK, TAKE CARE OF THINGS AT HOME, OR GET ALONG WITH OTHER PEOPLE: EXTREMELY DIFFICULT
6. BECOMING EASILY ANNOYED OR IRRITABLE: NEARLY EVERY DAY
5. BEING SO RESTLESS THAT IT IS HARD TO SIT STILL: MORE THAN HALF THE DAYS
2. NOT BEING ABLE TO STOP OR CONTROL WORRYING: NEARLY EVERY DAY
GAD7 TOTAL SCORE: 18

## 2017-04-12 ASSESSMENT — PATIENT HEALTH QUESTIONNAIRE - PHQ9: 5. POOR APPETITE OR OVEREATING: MORE THAN HALF THE DAYS

## 2017-04-12 ASSESSMENT — PAIN SCALES - GENERAL: PAINLEVEL: NO PAIN (0)
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
## 2017-04-12 NOTE — MR AVS SNAPSHOT
After Visit Summary   4/12/2017    Myrna Martinez    MRN: [id number]           Patient Information     Date Of Birth          1990        Visit Information        Provider Department      4/12/2017 11:30 AM Sandee Waller, [facility]        Today's Diagnoses     NICHOLE (generalized anxiety disorder)    -  1    Major depressive disorder, recurrent episode, moderate (H)           Follow-ups after your visit        Additional Services     MENTAL HEALTH REFERRAL       Your provider has referred you to: [facility] Carrie [place] [phone]   [email or web address]    All scheduling is subject to the client's specific insurance plan & benefits, provider/location availability, and provider clinical specialities.  Please arrive 15 minutes early for your first appointment and bring your completed paperwork.    Please be aware that coverage of these services is subject to the terms and limitations of your health insurance plan.  Call member services at your health plan with any benefit or coverage questions.                  Future tests that were ordered for you today     Open Future Orders        Priority Expected Expires Ordered    TSH with free T4 reflex Routine  4/12/2018 4/12/2017    CBC with platelets Routine  4/12/2018 4/12/2017            Who to contact     If you have questions or need follow up information about today's clinic visit or your schedule please contact [facility] [place] directly at [phone].  Normal or non-critical lab and imaging results will be communicated to you by MyChart, letter or phone within 4 business days after the clinic has received the results. If you do not hear from us within 7 days, please contact the clinic through MyChart or phone. If you have a critical or abnormal lab result, we will notify you by phone as soon as possible.  Submit refill requests through [place] [employer] or  "call your pharmacy and they will forward the refill request to us. Please allow 3 business days for your refill to be completed.          Additional Information About Your Visit        MyChart Information     [employer] lets you send messages to your doctor, view your test results, renew your prescriptions, schedule appointments and more. To sign up, go to [email or web address] . Click on \"Log in\" on the left side of the screen, which will take you to the Welcome page. Then click on \"Sign up Now\" on the right side of the page.     You will be asked to enter the access code listed below, as well as some personal information. Please follow the directions to create your username and password.     Your access code is: [email or web address]  Expires: 2017  2:38 PM     Your access code will  in 90 days. If you need help or a new code, please call your [place] clinic or [phone].        Care EveryWhere ID     This is your Care EveryWhere ID. This could be used by other organizations to access your [place] medical records  [id number]         Blood Pressure from Last 3 Encounters:   17 118/62   17 119/81   03/10/17 116/72    Weight from Last 3 Encounters:   17 152 lb (68.9 kg)   17 153 lb (69.4 kg)   03/10/17 145 lb (65.8 kg)              We Performed the Following     MENTAL HEALTH REFERRAL          Today's Medication Changes          These changes are accurate as of: 17  3:31 PM.  If you have any questions, ask your nurse or doctor.               Start taking these medicines.        Dose/Directions    hydrOXYzine 25 MG capsule   Commonly known as:  VISTARIL   Used for:  Mood swings (H), NICHOLE (generalized anxiety disorder)   Started by:  Nivia Jimenes MD        Dose:  25 mg   Take 1 capsule (25 mg) by mouth 3 times daily as needed for anxiety   Quantity:  40 capsule   Refills:  1       prenatal multivitamin  plus iron 27-0.8 MG Tabs per tablet   Used for:  NICHOLE (generalized anxiety " disorder), Mood swings (H)   Started by:  Nivia Jimenes MD        Dose:  1 tablet   Take 1 tablet by mouth daily   Quantity:  100 tablet   Refills:  3         Stop taking these medicines if you haven't already. Please contact your care team if you have questions.     fexofenadine 180 MG tablet   Commonly known as:  ALLEGRA   Stopped by:  Nivia Jimenes MD           fluticasone 50 MCG/ACT spray   Commonly known as:  FLONASE   Stopped by:  Nivia Jimenes MD           omeprazole 40 MG capsule   Commonly known as:  priLOSEC   Stopped by:  Nivia Jimenes MD                Where to get your medicines      These medications were sent to [id number] - [place] - [address] AT [facility] [address], [place] [address]     Phone:  [phone]     hydrOXYzine 25 MG capsule    prenatal multivitamin  plus iron 27-0.8 MG Tabs per tablet    ranitidine 300 MG tablet    sertraline 50 MG tablet                Primary Care Provider Office Phone # Fax #    Nivia Jimenes [phone]       [facility] [place] [address] [place], [place] [address] [address]        Thank you!     Thank you for choosing [facility]  for your care. Our goal is always to provide you with excellent care. Hearing back from our patients is one way we can continue to improve our services. Please take a few minutes to complete the written survey that you may receive in the mail after your visit with us. Thank you!             Your Updated Medication List - Protect others around you: Learn how to safely use, store and throw away your medicines at www.disposemymeds.org.          This list is accurate as of: 4/12/17  3:31 PM.  Always use your most recent med list.                   Brand Name Dispense Instructions for use    etonogestrel 68 MG Impl    IMPLANON/NEXPLANON     1 each by Subdermal route once       hydrOXYzine 25 MG capsule    VISTARIL    40 capsule    Take 1  capsule (25 mg) by mouth 3 times daily as needed for anxiety       prenatal multivitamin  plus iron 27-0.8 MG Tabs per tablet     100 tablet    Take 1 tablet by mouth daily       ranitidine 300 MG tablet    ZANTAC    30 tablet    Take 1 tablet (300 mg) by mouth At Bedtime       sertraline 50 MG tablet    ZOLOFT    30 tablet    Take 1 tablet (50 mg) by mouth daily

## 2017-04-12 NOTE — NURSING NOTE
"Chief Complaint   Patient presents with     LAB REQUEST     Depression       Initial /62  Pulse 64  Temp 98.5  F (36.9  C) (Tympanic)  Ht 5' 2\" (1.575 m)  Wt 152 lb (68.9 kg)  BMI 27.8 kg/m2 Estimated body mass index is 27.8 kg/(m^2) as calculated from the following:    Height as of this encounter: 5' 2\" (1.575 m).    Weight as of this encounter: 152 lb (68.9 kg).  Medication Reconciliation: complete   Pushpa Eli    "

## 2017-04-12 NOTE — MR AVS SNAPSHOT
After Visit Summary   4/12/2017    Myrna Martinez    MRN: [id number]           Patient Information     Date Of Birth          1990        Visit Information        Provider Department      4/12/2017 11:15 AM Nivia Jimenes MD [facility] [place]        Today's Diagnoses     Nausea    -  1    Mood swings (H)        NICHOLE (generalized anxiety disorder)        Gastroesophageal reflux disease without esophagitis        Major depressive disorder, recurrent episode, moderate (H)        Relationship problem between partners           Follow-ups after your visit        Additional Services     MENTAL HEALTH REFERRAL       Your provider has referred you to: [facility] Carrie [facility] [phone]   [email or web address]    Please be aware that coverage of these services is subject to the terms and limitations of your health insurance plan.  Call member services at your health plan with any benefit or coverage questions.      Please bring the following to your appointment:  >>   Any x-rays, CTs or MRIs which have been performed.  Contact the facility where they were done to arrange for  prior to your scheduled appointment.  Any new CT, MRI or other procedures ordered by your specialist must be performed at a [facility] facility or coordinated by your clinic's referral office.    >>   List of current medications   >>   This referral request   >>   Any documents/labs given to you for this referral                  Future tests that were ordered for you today     Open Future Orders        Priority Expected Expires Ordered    TSH with free T4 reflex Routine  4/12/2018 4/12/2017    CBC with platelets Routine  4/12/2018 4/12/2017            Who to contact     If you have questions or need follow up information about today's clinic visit or your schedule please contact [facility] [place] directly at [phone].  Normal or  "non-critical lab and imaging results will be communicated to you by MyChart, letter or phone within 4 business days after the clinic has received the results. If you do not hear from us within 7 days, please contact the clinic through [employer] or phone. If you have a critical or abnormal lab result, we will notify you by phone as soon as possible.  Submit refill requests through [employer] or call your pharmacy and they will forward the refill request to us. Please allow 3 business days for your refill to be completed.          Additional Information About Your Visit        [employer] Information     [employer] lets you send messages to your doctor, view your test results, renew your prescriptions, schedule appointments and more. To sign up, go to [email or web address] . Click on \"Log in\" on the left side of the screen, which will take you to the Welcome page. Then click on \"Sign up Now\" on the right side of the page.     You will be asked to enter the access code listed below, as well as some personal information. Please follow the directions to create your username and password.     Your access code is: [email or web address]  Expires: 2017  2:38 PM     Your access code will  in 90 days. If you need help or a new code, please call your [place] clinic or [phone].        Care EveryWhere ID     This is your Care EveryWhere ID. This could be used by other organizations to access your [place] medical records  [id number]        Your Vitals Were     Pulse Temperature Height BMI (Body Mass Index)          64 98.5  F (36.9  C) (Tympanic) 5' 2\" (1.575 m) 27.8 kg/m2         Blood Pressure from Last 3 Encounters:   17 118/62   17 119/81   03/10/17 116/72    Weight from Last 3 Encounters:   17 152 lb (68.9 kg)   17 153 lb (69.4 kg)   03/10/17 145 lb (65.8 kg)              We Performed the Following     HCG qualitative urine     MENTAL HEALTH REFERRAL          Today's Medication Changes          These " changes are accurate as of: 4/12/17 12:46 PM.  If you have any questions, ask your nurse or doctor.               Start taking these medicines.        Dose/Directions    hydrOXYzine 25 MG capsule   Commonly known as:  VISTARIL   Used for:  Mood swings (H), NICHOLE (generalized anxiety disorder)   Started by:  Nivia Jimenes MD        Dose:  25 mg   Take 1 capsule (25 mg) by mouth 3 times daily as needed for anxiety   Quantity:  40 capsule   Refills:  1       prenatal multivitamin  plus iron 27-0.8 MG Tabs per tablet   Used for:  NICHOLE (generalized anxiety disorder), Mood swings (H)   Started by:  Nivia Jimenes MD        Dose:  1 tablet   Take 1 tablet by mouth daily   Quantity:  100 tablet   Refills:  3         Stop taking these medicines if you haven't already. Please contact your care team if you have questions.     fexofenadine 180 MG tablet   Commonly known as:  ALLEGRA   Stopped by:  Nivia Jimenes MD           fluticasone 50 MCG/ACT spray   Commonly known as:  FLONASE   Stopped by:  Nivia Jimenes MD           omeprazole 40 MG capsule   Commonly known as:  priLOSEC   Stopped by:  Nivia Jimenes MD                Where to get your medicines      These medications were sent to [id number]  [place] - [address] AT [facility] [address], [facility] [address]     Phone:  [phone]     hydrOXYzine 25 MG capsule    prenatal multivitamin  plus iron 27-0.8 MG Tabs per tablet    ranitidine 300 MG tablet    sertraline 50 MG tablet                Primary Care Provider Office Phone # Fax #    Nivia Jimenes [phone]       [facility] [place] [address]  [place] [address]        Thank you!     Thank you for choosing [facility]  for your care. Our goal is always to provide you with excellent care. Hearing back from our patients is one way we can continue to improve our services. Please take a few minutes to  complete the written survey that you may receive in the mail after your visit with us. Thank you!             Your Updated Medication List - Protect others around you: Learn how to safely use, store and throw away your medicines at www.disposemymeds.org.          This list is accurate as of: 4/12/17 12:46 PM.  Always use your most recent med list.                   Brand Name Dispense Instructions for use    etonogestrel 68 MG Impl    IMPLANON/NEXPLANON     1 each by Subdermal route once       hydrOXYzine 25 MG capsule    VISTARIL    40 capsule    Take 1 capsule (25 mg) by mouth 3 times daily as needed for anxiety       prenatal multivitamin  plus iron 27-0.8 MG Tabs per tablet     100 tablet    Take 1 tablet by mouth daily       ranitidine 300 MG tablet    ZANTAC    30 tablet    Take 1 tablet (300 mg) by mouth At Bedtime       sertraline 50 MG tablet    ZOLOFT    30 tablet    Take 1 tablet (50 mg) by mouth daily

## 2017-04-12 NOTE — PROGRESS NOTES
[facility]  April 12, 2017      Behavioral Health Clinician Progress Note    Patient Name: Myrna Martinez           Service Type: Individual      Service Location:   Face to Face in Clinic     Session Start Time: 11:50  Session End Time: 1:30      Session Length: 90      Attendees: Client    Visit Activities (Refresh list every visit): NEW, [facility] Only and [address] Crisis Visit with ED Admission Averted    Diagnostic Assessment Date: 11/17/16  Treatment Plan Review Date: 4/12/17 - 7/12/17  See Flowsheets for today's PHQ-9 and NICHOLE-7 results  Previous PHQ-9:   PHQ-9 SCORE 4/29/2016 11/9/2016 4/12/2017   Total Score 9 6 17     Previous NICHOLE-7:   NICHOLE-7 SCORE 11/9/2016 4/12/2017   Total Score 9 18       RAMO LEVEL:  RAMO Score (Last Two) 11/10/2016   RAMO Raw Score 27   Activation Score 47.4   RAMO Level 2       DATA  Extended Session (60+ minutes): PROLONGED SERVICE IN THE OUTPATIENT SETTING REQUIRING DIRECT (FACE-TO-FACE) PATIENT CONTACT BEYOND THE USUAL SERVICE:    - Longer session due to limited access to mental health appointments and necessity to address patient's distress / complexity    - Patient's presenting concerns require more intensive intervention than could be completed within the usual service    - High distress and under complex circumstances.  See Data section for details  Interactive Complexity: Yes, visit entailed Interactive Complexity evidenced by:  -The need to manage maladaptive communication (related to, e.g., high anxiety, high reactivity, repeated questions, or disagreement) among participants that complicates delivery of care  Crisis: Yes, visit entailed Crisis Management / Stabilization requiring urgent assessment and history of the crisis state, mental status exam and disposition  -Presenting problem with life threatening or complex issues that required immediate attention to a patient in high distress    Treatment Objective(s) Addressed in This Session:  Target  "Behavior(s): symptom management associated with mental health/ anxiety.    Anxiety: will experience a reduction in anxiety, will develop more effective coping skills to manage anxiety symptoms, will develop healthy cognitive patterns and beliefs and will increase ability to function adaptively    Current Stressors / Issues:  Patient reports having significant anxiety lately.  Feeling emotional overload with life responsibilities and relationship stressors.  Feels conflicted about working on keeping relationship or taking a break from her partner for a while.    Allowed patient to release her emotions and express herself in a safe and therapeutic environment.    Reviewed coping skills.  Does not know many coping skills.  Reports deep breathing through her nose is difficult because of physical issues.  Does do deep breathing to the best of her abilities.  Discussed reframing thoughts and mindfulness techniques of building her safe place/ escape.  Also reviewed \"be the pebble\" technique.    Reviewed [facility] goal and updated this goal to reflect current life situation.  Refer to flow sheet for goal.    Progress on Treatment Objective(s) / Homework:  New Objective established this session - PRECONTEMPLATION (Not seeing need for change); Intervened by educating the patient about the effects of current behavior on health.  Evoked information about reasons to continue behavior, express concern / recommendations, and explored any change talk    Motivational Interviewing    MI Intervention: Expressed Empathy/Understanding, Supported Autonomy, Collaboration, Evocation, Permission to raise concern or advise, Open-ended questions, Reflections: simple and complex, Change talk (evoked) and Reframe     Change Talk Expressed by the Patient: Desire to change Ability to change Reasons to change Need to change    Provider Response to Change Talk: E - Evoked more info from patient about behavior change, A - Affirmed patient's thoughts, " decisions, or attempts at behavior change, R - Reflected patient's change talk and S - Summarized patient's change talk statements    Also provided psychoeducation about behavioral health condition, symptoms, and treatment options    Care Plan review completed: Yes    Medication Review:  Changes to psychiatric medications, see updated Medication List in EPIC.     Medication Compliance:  was just prescribed medications.    Changes in Health Issues:   Yes: ENT/ sinus issues- states unable to breathe through nose.  Can not smell.    Chemical Use Review:   Substance Use: Chemical use reviewed, no active concerns identified      Tobacco Use: No current tobacco use.      Assessment: Current Emotional / Mental Status (status of significant symptoms):  Risk status (Self / Other harm or suicidal ideation)  Patient has had a history of suicidal ideation: 1x previously about 2 years ago. and suicide attempts: x1  Patient denies current fears or concerns for personal safety.  Patient denies current or recent suicidal ideation or behaviors.  Patient denies current or recent homicidal ideation or behaviors.  Patient denies current or recent self injurious behavior or ideation.  Patient denies other safety concerns.  Patient reports there are firearms in the house. The firearms are secured in a locked space  A safety and risk management plan has not been developed at this time, however client was given the after-hours number / 911 should there be a change in any of these risk factors.    Appearance:   Appropriate   Eye Contact:   Good   Psychomotor Behavior: Agitated  Restless   Attitude:   Cooperative   Orientation:   All  Speech   Rate / Production: Normal    Volume:  Normal   Mood:    Angry  Anxious  Sad   Affect:    Expansive   Thought Content:  Clear   Thought Form:  Coherent  Logical   Insight:    Good     Diagnoses:  1. NICHOLE (generalized anxiety disorder)    2. Major depressive disorder, recurrent episode, moderate (H)         Collateral Reports Completed:  Referral made to counceling department for couples counseling.    Plan: (Homework, other):  Patient was given information about behavioral services and encouraged to schedule a follow up appointment with the clinic [facility] in 1 week.  She was also given Cognitive Behavioral Therapy skills to practice when experiencing anxiety.  CD Recommendations: No indications of CD issues.  Sandee Waller [facility], [facility]      ______________________________________________________________________    Integrated Primary Care Behavioral Health Treatment Plan    Patient's Name: Myrna Martinez  YOB: 1990    Date: April 12, 2017      DSM-V Diagnoses: 311 Other/unspec. Depressive Disorder  300.02 (F41.1) Generalized Anxiety Disorder  Psychosocial / Contextual Factors: health issues, limited social support, mental health symptoms, occupational / vocational stress and relationship stress  WHODAS: 23    Referral / Collaboration:  The following referral(s) will be initiated: Issa Hess for family counseling.    Anticipated number of session or this episode of care: 24    MeasurableTreatment Goal(s) related to diagnosis / functional impairment(s)  Goal 1: Patient will focus on improving herself and decreasing her anxiety.    I will know I've met my goal when I feel happy and my anxiety is less.      Objective #A (Patient Action)    Patient will use cognitive strategies identified in therapy to challenge anxious thoughts  practice coping skills learned in therapy to help reduce anxiety.  Status: New - Date: 4/12/17     Intervention(s)  [facility] will provide appropriate theraputic relationship in which patient can release emotions and stressors in a safe place.  teach CBT skills.    Patient has reviewed and agreed to the above plan.      Sandee Waller, [facility]  April 12, 2017

## 2017-04-13 ASSESSMENT — ANXIETY QUESTIONNAIRES: GAD7 TOTAL SCORE: 18

## 2017-04-13 ASSESSMENT — PATIENT HEALTH QUESTIONNAIRE - PHQ9: SUM OF ALL RESPONSES TO PHQ QUESTIONS 1-9: 17

## 2017-04-17 ENCOUNTER — TELEPHONE (OUTPATIENT)
Dept: BEHAVIORAL HEALTH | Facility: OTHER | Age: 27
End: 2017-04-17

## 2017-04-17 NOTE — TELEPHONE ENCOUNTER
Patient called in crisis over relationship issues.  Processed concerns and de-escelated patient's stress.  Spoke with patient on the phone for a while.  Answered questions.  Patient felt relief and was able to end phone call and go on with her day.    Sandee Waller, MSW, [facility], [facility]

## 2017-04-19 ENCOUNTER — OFFICE VISIT (OUTPATIENT)
Dept: BEHAVIORAL HEALTH | Facility: OTHER | Age: 27
End: 2017-04-19
Attending: SOCIAL WORKER
Payer: COMMERCIAL

## 2017-04-19 DIAGNOSIS — F41.1 GAD (GENERALIZED ANXIETY DISORDER): Primary | ICD-10-CM

## 2017-04-19 DIAGNOSIS — F33.1 MAJOR DEPRESSIVE DISORDER, RECURRENT EPISODE, MODERATE (H): ICD-10-CM

## 2017-04-19 PROCEDURE — 90837 PSYTX W PT 60 MINUTES: CPT | Performed by: SOCIAL WORKER

## 2017-04-19 NOTE — PROGRESS NOTES
[facility]  April 19, 2017    Behavioral Health Clinician Progress Note    Patient Name: Myrna Martinez           Service Type: Individual      Service Location:   Face to Face in Clinic     Session Start Time: 11:00  Session End Time: 12:00      Session Length: 53 - 60      Attendees: Client    Visit Activities (Refresh list every visit): [facility] Only    Diagnostic Assessment Date: 11/17/16  Treatment Plan Review Date: 4/12/17 - 7/12/17  See Flowsheets for today's PHQ-9 and NICHOLE-7 results  Previous PHQ-9:   PHQ-9 SCORE 4/29/2016 11/9/2016 4/12/2017   Total Score 9 6 17     Previous NICHOLE-7:   NICHOLE-7 SCORE 11/9/2016 4/12/2017   Total Score 9 18       RAMO LEVEL:  RAMO Score (Last Two) 11/10/2016   RAMO Raw Score 27   Activation Score 47.4   RAMO Level 2     DATA  Extended Session (60+ minutes): No  Interactive Complexity: No  Crisis: No    Treatment Objective(s) Addressed in This Session:  Target Behavior(s): symptom management associated with mental health/ anxiety.    Anxiety: will experience a reduction in anxiety, will develop more effective coping skills to manage anxiety symptoms, will develop healthy cognitive patterns and beliefs and will increase ability to function adaptively    Current Stressors / Issues:  Continues to have relationship stress with significant other.  Is contemplating not moving into a new house her significant other is buying.  Wanting to get an apartment and live by herself for a while.    Reviewed coping skills.  States that talking in therapy helps.  Has been thinking a lot about her situation and is able to come to an appropriate conclusion.  Does not feel like her thoughts are racing, but is able to  evaluate each situation appropriately.    Progress on Treatment Objective(s) / Homework:  Minimal progress - PREPARATION (Decided to change - considering how); Intervened by negotiating a change plan and determining options / strategies for behavior change,  identifying triggers, exploring social supports, and working towards setting a date to begin behavior change    Motivational Interviewing    MI Intervention: Expressed Empathy/Understanding, Supported Autonomy, Collaboration, Evocation, Permission to raise concern or advise, Open-ended questions, Reflections: simple and complex, Change talk (evoked) and Reframe     Change Talk Expressed by the Patient: Desire to change Ability to change Reasons to change Need to change    Provider Response to Change Talk: E - Evoked more info from patient about behavior change, A - Affirmed patient's thoughts, decisions, or attempts at behavior change, R - Reflected patient's change talk and S - Summarized patient's change talk statements    Also provided psychoeducation about behavioral health condition, symptoms, and treatment options    Care Plan review completed: No    Medication Review:  No changes to current psychiatric medication(s)    Medication Compliance:  was just prescribed medications.    Changes in Health Issues:   Yes: ENT/ sinus issues- states unable to breathe through nose.  Can not smell.    Chemical Use Review:   Substance Use: Chemical use reviewed, no active concerns identified   Reports that she had been using marijuana, but has since quit.     Tobacco Use: No current tobacco use.      Assessment: Current Emotional / Mental Status (status of significant symptoms):  Risk status (Self / Other harm or suicidal ideation)  Patient has had a history of suicidal ideation: 1x previously about 2 years ago. and suicide attempts: x1  Patient denies current fears or concerns for personal safety.  Patient denies current or recent suicidal ideation or behaviors.  Patient denies current or recent homicidal ideation or behaviors.  Patient denies current or recent self injurious behavior or ideation.  Patient denies other safety concerns.  Patient reports there are firearms in the house. The firearms are secured in a locked  space  A safety and risk management plan has not been developed at this time, however client was given the after-hours number / 911 should there be a change in any of these risk factors.    Appearance:   Appropriate   Eye Contact:   Good   Psychomotor Behavior: Agitated  Restless   Attitude:   Cooperative   Orientation:   All  Speech   Rate / Production: Normal    Volume:  Normal   Mood:    Anxious  Sad   Affect:    Appropriate   Thought Content:  Clear   Thought Form:  Coherent  Logical   Insight:    Good     Diagnoses:  1. NICHOLE (generalized anxiety disorder)    2. Major depressive disorder, recurrent episode, moderate (H)      Collateral Reports Completed:  Referral made to counceling department for couples counseling.    Plan: (Homework, other):  Patient was given information about behavioral services and encouraged to schedule a follow up appointment with the clinic [facility] in 1 week.  She was also given Cognitive Behavioral Therapy skills to practice when experiencing anxiety.  CD Recommendations: No indications of CD issues.  Sandee Waller, [facility], [facility]

## 2017-04-19 NOTE — MR AVS SNAPSHOT
"              After Visit Summary   4/19/2017    Myrna Martinez    MRN: [id number]           Patient Information     Date Of Birth          1990        Visit Information        Provider Department      4/19/2017 11:00 AM Sandee Waller [facility]        Today's Diagnoses     NICHOLE (generalized anxiety disorder)    -  1    Major depressive disorder, recurrent episode, moderate (H)           Follow-ups after your visit        Your next 10 appointments already scheduled     Apr 24, 2017  9:30 AM CDT   (Arrive by 9:15 AM)   Return Visit with Sandee Waller [facility] ([facility])    [address] [place] [id number] [phone]              Who to contact     If you have questions or need follow up information about today's clinic visit or your schedule please contact [facility] directly at [phone].  Normal or non-critical lab and imaging results will be communicated to you by MyChart, letter or phone within 4 business days after the clinic has received the results. If you do not hear from us within 7 days, please contact the clinic through MyChart or phone. If you have a critical or abnormal lab result, we will notify you by phone as soon as possible.  Submit refill requests through [employer] or call your pharmacy and they will forward the refill request to us. Please allow 3 business days for your refill to be completed.          Additional Information About Your Visit        MyChart Information     [employer] lets you send messages to your doctor, view your test results, renew your prescriptions, schedule appointments and more. To sign up, go to [email or web address] . Click on \"Log in\" on the left side of the screen, which will take you to the Welcome page. Then click on \"Sign up Now\" on the right side of the page.     You will be asked to enter the access code listed below, as well as some personal information. Please follow " the directions to create your username and password.     Your access code is: [email or web address]  Expires: 2017  2:38 PM     Your access code will  in 90 days. If you need help or a new code, please call your [place] clinic or [phone].        Care EveryWhere ID     This is your Care EveryWhere ID. This could be used by other organizations to access your [place] medical records  [id number]         Blood Pressure from Last 3 Encounters:   17 118/62   17 119/81   03/10/17 116/72    Weight from Last 3 Encounters:   17 152 lb (68.9 kg)   17 153 lb (69.4 kg)   03/10/17 145 lb (65.8 kg)              Today, you had the following     No orders found for display       Primary Care Provider Office Phone # Fax #    Nivia Jimenes [phone]       [facility] [place] [address]  [place] [address]        Thank you!     Thank you for choosing [facility] [place]  for your care. Our goal is always to provide you with excellent care. Hearing back from our patients is one way we can continue to improve our services. Please take a few minutes to complete the written survey that you may receive in the mail after your visit with us. Thank you!             Your Updated Medication List - Protect others around you: Learn how to safely use, store and throw away your medicines at www.disposemymeds.org.          This list is accurate as of: 17  1:20 PM.  Always use your most recent med list.                   Brand Name Dispense Instructions for use    etonogestrel 68 MG Impl    IMPLANON/NEXPLANON     1 each by Subdermal route once       hydrOXYzine 25 MG capsule    VISTARIL    40 capsule    Take 1 capsule (25 mg) by mouth 3 times daily as needed for anxiety       prenatal multivitamin  plus iron 27-0.8 MG Tabs per tablet     100 tablet    Take 1 tablet by mouth daily       ranitidine 300 MG tablet    ZANTAC    30 tablet    Take 1 tablet (300 mg) by mouth At Bedtime        sertraline 50 MG tablet    ZOLOFT    30 tablet    Take 1 tablet (50 mg) by mouth daily

## 2017-04-20 ENCOUNTER — TELEPHONE (OUTPATIENT)
Dept: BEHAVIORAL HEALTH | Facility: OTHER | Age: 27
End: 2017-04-20

## 2017-04-20 NOTE — TELEPHONE ENCOUNTER
"[facility]  @FLOW([id number])@      Social Work Care Navigator Note      Patient: Myrna Martinez  Date: April 20, 2017  Preferred Name: Myrna    Previous PHQ-9:   PHQ-9 SCORE 4/29/2016 11/9/2016 4/12/2017   Total Score 9 6 17     Previous NICHOLE-7:   NICHOLE-7 SCORE 11/9/2016 4/12/2017   Total Score 9 18     RAMO LEVEL:  RAMO Score (Last Two) 11/10/2016   RAMO Raw Score 27   Activation Score 47.4   RAMO Level 2       Preferred Contact: @MARION([id number])@  Type of Contact Today: Phone call (not reached/unavailable)      Data: (subjective / Objective):  Patient Goals Areas: @FLOW([id number])@  Patient Stated Goals: To write down  tasks , appt,  dates and times  for less stre-  ss and worry in  thoughts.\"  Recent ED/IP Admission or Discharge?   None  Attempted to reach patient, but was unsuccessful.  Plan to attempt again.  Sejal Hyman        Next 5 appointments (look out 90 days)     Apr 24, 2017  9:30 AM CDT   (Arrive by 9:15 AM)   Return Visit with Sandee Waller, [facility] ([facility])    [address] [place] [address] [phone]                "

## 2017-04-21 ENCOUNTER — TELEPHONE (OUTPATIENT)
Dept: BEHAVIORAL HEALTH | Facility: OTHER | Age: 27
End: 2017-04-21

## 2017-04-23 ENCOUNTER — AMBULATORY - HEALTHEAST (OUTPATIENT)
Dept: MULTI SPECIALTY CLINIC | Facility: CLINIC | Age: 27
End: 2017-04-23

## 2017-04-23 LAB
HPV_EXT - HISTORICAL: NORMAL
PAP SMEAR - HIM PATIENT REPORTED: NORMAL

## 2017-04-24 ENCOUNTER — OFFICE VISIT (OUTPATIENT)
Dept: BEHAVIORAL HEALTH | Facility: OTHER | Age: 27
End: 2017-04-24
Attending: SOCIAL WORKER
Payer: COMMERCIAL

## 2017-04-24 DIAGNOSIS — F41.1 GAD (GENERALIZED ANXIETY DISORDER): Primary | ICD-10-CM

## 2017-04-24 DIAGNOSIS — F33.1 MAJOR DEPRESSIVE DISORDER, RECURRENT EPISODE, MODERATE (H): ICD-10-CM

## 2017-04-24 PROCEDURE — 90832 PSYTX W PT 30 MINUTES: CPT | Performed by: SOCIAL WORKER

## 2017-04-24 NOTE — MR AVS SNAPSHOT
"              After Visit Summary   4/24/2017    Myrna Martinez    MRN: [id number]           Patient Information     Date Of Birth          1990        Visit Information        Provider Department      4/24/2017 9:30 AM Sandee Waller [facility]        Today's Diagnoses     NICHOLE (generalized anxiety disorder)    -  1    Major depressive disorder, recurrent episode, moderate (H)           Follow-ups after your visit        Your next 10 appointments already scheduled     May 02, 2017  9:00 AM CDT   (Arrive by 8:45 AM)   Return Visit with Sandee Waller [facility] ([facility])    [address] [facility] [id number] [phone]            Jun 06, 2017 10:30 AM CDT   (Arrive by 10:15 AM)   New Visit with DAVID Hammer   [facility] [place] [facility] ([facility])    [address] [place] [address] [phone]              Who to contact     If you have questions or need follow up information about today's clinic visit or your schedule please contact [facility] directly at [phone].  Normal or non-critical lab and imaging results will be communicated to you by MyChart, letter or phone within 4 business days after the clinic has received the results. If you do not hear from us within 7 days, please contact the clinic through [email or web address] or phone. If you have a critical or abnormal lab result, we will notify you by phone as soon as possible.  Submit refill requests through [employer] or call your pharmacy and they will forward the refill request to us. Please allow 3 business days for your refill to be completed.          Additional Information About Your Visit        MyChart Information     [employer] lets you send messages to your doctor, view your test results, renew your prescriptions, schedule appointments and more. To sign up, go to [email or web address] . Click on \"Log in\" on the left side of the screen, which " "will take you to the Welcome page. Then click on \"Sign up Now\" on the right side of the page.     You will be asked to enter the access code listed below, as well as some personal information. Please follow the directions to create your username and password.     Your access code is: [email or web address]  Expires: 2017  2:38 PM     Your access code will  in 90 days. If you need help or a new code, please call your [place] clinic or [phone].        Care EveryWhere ID     This is your Care EveryWhere ID. This could be used by other organizations to access your [place] medical records  [id number]         Blood Pressure from Last 3 Encounters:   17 118/62   17 119/81   03/10/17 116/72    Weight from Last 3 Encounters:   17 152 lb (68.9 kg)   17 153 lb (69.4 kg)   03/10/17 145 lb (65.8 kg)              Today, you had the following     No orders found for display       Primary Care Provider Office Phone # Fax #    Nivia Jimenes [phone]       [facility] [place] [address] [place] [address]  [place] [address]        Thank you!     Thank you for choosing [facility] [place]  for your care. Our goal is always to provide you with excellent care. Hearing back from our patients is one way we can continue to improve our services. Please take a few minutes to complete the written survey that you may receive in the mail after your visit with us. Thank you!             Your Updated Medication List - Protect others around you: Learn how to safely use, store and throw away your medicines at www.disposemymeds.org.          This list is accurate as of: 17 11:31 AM.  Always use your most recent med list.                   Brand Name Dispense Instructions for use    etonogestrel 68 MG Impl    IMPLANON/NEXPLANON     1 each by Subdermal route once       hydrOXYzine 25 MG capsule    VISTARIL    40 capsule    Take 1 capsule (25 mg) by mouth 3 times daily as needed for anxiety       " prenatal multivitamin  plus iron 27-0.8 MG Tabs per tablet     100 tablet    Take 1 tablet by mouth daily       ranitidine 300 MG tablet    ZANTAC    30 tablet    Take 1 tablet (300 mg) by mouth At Bedtime       sertraline 50 MG tablet    ZOLOFT    30 tablet    Take 1 tablet (50 mg) by mouth daily

## 2017-04-24 NOTE — PROGRESS NOTES
[facility]  April 24, 2017    Behavioral Health Clinician Progress Note    Patient Name: Myrna Martinez           Service Type: Individual      Service Location:   Face to Face in Clinic     Session Start Time: 9:00  Session End Time: 9:30      Session Length: 16 - 37      Attendees: Client    Visit Activities (Refresh list every visit): [facility] Only    Diagnostic Assessment Date: 11/17/16  Treatment Plan Review Date: 4/12/17 - 7/12/17  See Flowsheets for today's PHQ-9 and NICHOLE-7 results  Previous PHQ-9:   PHQ-9 SCORE 4/29/2016 11/9/2016 4/12/2017   Total Score 9 6 17     Previous NICHOLE-7:   NICHOLE-7 SCORE 11/9/2016 4/12/2017   Total Score 9 18       RAMO LEVEL:  RAMO Score (Last Two) 11/10/2016   RAMO Raw Score 27   Activation Score 47.4   RAMO Level 2     DATA  Extended Session (60+ minutes): No  Interactive Complexity: No  Crisis: No    Treatment Objective(s) Addressed in This Session:  Target Behavior(s): symptom management associated with mental health/ anxiety.    Anxiety: will experience a reduction in anxiety, will develop more effective coping skills to manage anxiety symptoms, will develop healthy cognitive patterns and beliefs and will increase ability to function adaptively    Current Stressors / Issues:  Patient reports she is talking more openly in her relationship.  Reflects that her partner is seeking help and was started on medications, which patient expresses relief over.  Reports she is standing firm in her decision to not move into the house that her partner recently purchased.  Will stay with her mom until she gets an apartment of her own.    Reviewed coping skills- continuing to use them as they are helpful for her.  Did not add any new skills at this appointment, reviewed and encouraged continued use of ones she has been building.    Progress on Treatment Objective(s) / Homework:  Minimal progress - ACTION (Actively working towards change); Intervened by reinforcing change  plan / affirming steps taken    Motivational Interviewing    MI Intervention: Expressed Empathy/Understanding, Supported Autonomy, Collaboration, Evocation, Permission to raise concern or advise, Open-ended questions, Reflections: simple and complex, Change talk (evoked) and Reframe     Change Talk Expressed by the Patient: Desire to change Ability to change Reasons to change Need to change    Provider Response to Change Talk: E - Evoked more info from patient about behavior change, A - Affirmed patient's thoughts, decisions, or attempts at behavior change, R - Reflected patient's change talk and S - Summarized patient's change talk statements    Also provided psychoeducation about behavioral health condition, symptoms, and treatment options    Care Plan review completed: No    Medication Review:  No changes to current psychiatric medication(s)    Medication Compliance:  was just prescribed medications.    Changes in Health Issues:   Yes: ENT/ sinus issues- states unable to breathe through nose.  Can not smell.    Chemical Use Review:   Substance Use: Chemical use reviewed, no active concerns identified   Reports that she had been using marijuana, but has since quit.     Tobacco Use: No current tobacco use.      Assessment: Current Emotional / Mental Status (status of significant symptoms):  Risk status (Self / Other harm or suicidal ideation)  Patient has had a history of suicidal ideation: 1x previously about 2 years ago. and suicide attempts: x1  Patient denies current fears or concerns for personal safety.  Patient denies current or recent suicidal ideation or behaviors.  Patient denies current or recent homicidal ideation or behaviors.  Patient denies current or recent self injurious behavior or ideation.  Patient denies other safety concerns.  Patient reports there are firearms in the house. The firearms are secured in a locked space  A safety and risk management plan has not been developed at this time,  however client was given the after-hours number / 911 should there be a change in any of these risk factors.    Appearance:   Appropriate   Eye Contact:   Good   Psychomotor Behavior: Normal   Attitude:   Cooperative   Orientation:   All  Speech   Rate / Production: Normal    Volume:  Normal   Mood:    Anxious   Affect:    Appropriate   Thought Content:  Clear   Thought Form:  Coherent  Logical   Insight:    Good     Diagnoses:  1. NICHOLE (generalized anxiety disorder)    2. Major depressive disorder, recurrent episode, moderate (H)      Collateral Reports Completed:  Referral made to counceling department for couples counseling.    Plan: (Homework, other):  Patient was given information about behavioral services and encouraged to schedule a follow up appointment with the clinic [facility] in 1 week.  Also scheduled a new appointment for family therapy with Issa Villegas.  Can't get in to see him until beginning of June.  She was also given Cognitive Behavioral Therapy skills to practice when experiencing anxiety.  CD Recommendations: No indications of CD issues.  Sandee Waller, [facility], [facility]

## 2017-05-02 ENCOUNTER — OFFICE VISIT (OUTPATIENT)
Dept: BEHAVIORAL HEALTH | Facility: OTHER | Age: 27
End: 2017-05-02
Attending: SOCIAL WORKER
Payer: MEDICAID

## 2017-05-02 DIAGNOSIS — F41.1 GAD (GENERALIZED ANXIETY DISORDER): Primary | ICD-10-CM

## 2017-05-02 DIAGNOSIS — F33.1 MAJOR DEPRESSIVE DISORDER, RECURRENT EPISODE, MODERATE (H): ICD-10-CM

## 2017-05-02 PROCEDURE — 90837 PSYTX W PT 60 MINUTES: CPT | Performed by: SOCIAL WORKER

## 2017-05-02 NOTE — MR AVS SNAPSHOT
After Visit Summary   5/2/2017    Myrna Martinez    MRN: [id number]           Patient Information     Date Of Birth          1990        Visit Information        Provider Department      5/2/2017 9:00 AM Sandee Waller [facility]        Today's Diagnoses     NICHOLE (generalized anxiety disorder)    -  1    Major depressive disorder, recurrent episode, moderate (H)           Follow-ups after your visit        Your next 10 appointments already scheduled     May 02, 2017  3:00 PM CDT   (Arrive by 2:45 PM)   New Visit with DAVID Hammer   [facility] [place] [facility] ([facility])    [address] [address] [phone]            May 11, 2017  9:00 AM CDT   (Arrive by 8:45 AM)   Return Visit with Sandee Waller [facility] ([facility])    [address] [place] [id number] [phone]            Jun 06, 2017 10:30 AM CDT   (Arrive by 10:15 AM)   New Visit with DAVID Hammer   [facility] [place] [facility] ([facility])    [address] [id number] [phone]              Who to contact     If you have questions or need follow up information about today's clinic visit or your schedule please contact [facility] directly at [phone].  Normal or non-critical lab and imaging results will be communicated to you by [employer], letter or phone within 4 business days after the clinic has received the results. If you do not hear from us within 7 days, please contact the clinic through [employer] or phone. If you have a critical or abnormal lab result, we will notify you by phone as soon as possible.  Submit refill requests through Shoop or call your pharmacy and they will forward the refill request to us. Please allow 3 business days for your refill to be completed.          Additional Information About Your Visit        [employer] Information     Shoop lets you send messages  "to your doctor, view your test results, renew your prescriptions, schedule appointments and more. To sign up, go to [email or web address] . Click on \"Log in\" on the left side of the screen, which will take you to the Welcome page. Then click on \"Sign up Now\" on the right side of the page.     You will be asked to enter the access code listed below, as well as some personal information. Please follow the directions to create your username and password.     Your access code is: [email or web address]  Expires: 2017  2:38 PM     Your access code will  in 90 days. If you need help or a new code, please call your [place] clinic or [phone].        Care EveryWhere ID     This is your Care EveryWhere ID. This could be used by other organizations to access your [place] medical records  [id number]         Blood Pressure from Last 3 Encounters:   17 118/62   17 119/81   03/10/17 116/72    Weight from Last 3 Encounters:   17 152 lb (68.9 kg)   17 153 lb (69.4 kg)   03/10/17 145 lb (65.8 kg)              Today, you had the following     No orders found for display       Primary Care Provider Office Phone # Fax #    Nivia Jimenes [phone]       [facility] [place] [address] [place] [id number]        Thank you!     [facility] you [facility]  [facility] your [facility]. Our goal is always to provide you with excellent care. Hearing back from our patients is one way we can continue to improve our services. Please take a few minutes to complete the written survey that you may receive in the mail after your visit with us. Thank you!             Your Updated Medication List - Protect others around you: Learn how to safely use, store and throw away your medicines at www.disposemymeds.org.          This list is accurate as of: 17 11:17 AM.  Always use your most recent med list.                   Brand Name Dispense Instructions for use    etonogestrel 68 MG " Impl    IMPLANON/NEXPLANON     1 each by Subdermal route once       hydrOXYzine 25 MG capsule    VISTARIL    40 capsule    Take 1 capsule (25 mg) by mouth 3 times daily as needed for anxiety       prenatal multivitamin  plus iron 27-0.8 MG Tabs per tablet     100 tablet    Take 1 tablet by mouth daily       ranitidine 300 MG tablet    ZANTAC    30 tablet    Take 1 tablet (300 mg) by mouth At Bedtime       sertraline 50 MG tablet    ZOLOFT    30 tablet    Take 1 tablet (50 mg) by mouth daily

## 2017-05-02 NOTE — PROGRESS NOTES
[facility]  April 24, 2017    Behavioral Health Clinician Progress Note    Patient Name: Myrna Martinez           Service Type: Individual      Service Location:   Face to Face in Clinic     Session Start Time: 9:15  Session End Time: 10:15      Session Length: 53 - 60      Attendees: Client    Visit Activities (Refresh list every visit): [facility] Only    Diagnostic Assessment Date: 11/17/16  Treatment Plan Review Date: 4/12/17 - 7/12/17  See Flowsheets for today's PHQ-9 and NICHOLE-7 results  Previous PHQ-9:   PHQ-9 SCORE 4/29/2016 11/9/2016 4/12/2017   Total Score 9 6 17     Previous NICHOLE-7:   NICHOLE-7 SCORE 11/9/2016 4/12/2017   Total Score 9 18       RAMO LEVEL:  RAMO Score (Last Two) 11/10/2016   RAMO Raw Score 27   Activation Score 47.4   RAMO Level 2     DATA  Extended Session (60+ minutes): No  Interactive Complexity: No  Crisis: No    Treatment Objective(s) Addressed in This Session:  Target Behavior(s): symptom management associated with mental health/ anxiety.    Anxiety: will experience a reduction in anxiety, will develop more effective coping skills to manage anxiety symptoms, will develop healthy cognitive patterns and beliefs and will increase ability to function adaptively    Current Stressors / Issues:  Patient reports continued stress in her relationship.  Has made the decision to not move to the house with him, and is now contemplating the worth of the relationship.      Patient indicates continued desire to do couples counseling with patient and significant other.  Has appointment scheduled in June, called scheduling to see if there is a sooner appointment.  They do have one this afternoon which patient is scheduled for.  Left message for patient as she has left my scheduled appointment already.    Progress on Treatment Objective(s) / Homework:  Minimal progress - ACTION (Actively working towards change); Intervened by reinforcing change plan / affirming steps taken    Motivational  Interviewing    MI Intervention: Expressed Empathy/Understanding, Supported Autonomy, Collaboration, Evocation, Permission to raise concern or advise, Open-ended questions, Reflections: simple and complex, Change talk (evoked) and Reframe     Change Talk Expressed by the Patient: Desire to change Ability to change Reasons to change Need to change    Provider Response to Change Talk: E - Evoked more info from patient about behavior change, A - Affirmed patient's thoughts, decisions, or attempts at behavior change, R - Reflected patient's change talk and S - Summarized patient's change talk statements    Also provided psychoeducation about behavioral health condition, symptoms, and treatment options    Care Plan review completed: No    Medication Review:  No changes to current psychiatric medication(s)    Medication Compliance:  was just prescribed medications.    Changes in Health Issues:   Yes: ENT/ sinus issues- states unable to breathe through nose.  Can not smell.    Chemical Use Review:   Substance Use: Chemical use reviewed, no active concerns identified   Reports that she had been using marijuana, but has since quit.     Tobacco Use: No current tobacco use.      Assessment: Current Emotional / Mental Status (status of significant symptoms):  Risk status (Self / Other harm or suicidal ideation)  Patient has had a history of suicidal ideation: 1x previously about 2 years ago. and suicide attempts: x1  Patient denies current fears or concerns for personal safety.  Patient denies current or recent suicidal ideation or behaviors.  Patient denies current or recent homicidal ideation or behaviors.  Patient denies current or recent self injurious behavior or ideation.  Patient denies other safety concerns.  Patient reports there are firearms in the house. The firearms are secured in a locked space  A safety and risk management plan has not been developed at this time, however client was given the after-hours number /  911 should there be a change in any of these risk factors.    Appearance:   Appropriate   Eye Contact:   Good   Psychomotor Behavior: Normal   Attitude:   Cooperative   Orientation:   All  Speech   Rate / Production: Normal    Volume:  Normal   Mood:    Sad   Affect:    Appropriate   Thought Content:  Clear   Thought Form:  Coherent  Logical   Insight:    Good     Diagnoses:  1. NICHOLE (generalized anxiety disorder)    2. Major depressive disorder, recurrent episode, moderate (H)      Collateral Reports Completed:  Not Applicable  Previousreferral made to counseling department for couples counseling.    Plan: (Homework, other):  Patient was given information about behavioral services and encouraged to schedule a follow up appointment with the clinic [facility] in 1 week.  Also scheduled a new appointment for family therapy with Issa Villegas.  Can't get in to see him until beginning of June.  She was also given Cognitive Behavioral Therapy skills to practice when experiencing anxiety.  CD Recommendations: No indications of CD issues.  DARLING Barboza, [facility]

## 2017-05-11 ENCOUNTER — OFFICE VISIT (OUTPATIENT)
Dept: BEHAVIORAL HEALTH | Facility: OTHER | Age: 27
End: 2017-05-11
Attending: SOCIAL WORKER
Payer: MEDICAID

## 2017-05-11 DIAGNOSIS — F33.1 MAJOR DEPRESSIVE DISORDER, RECURRENT EPISODE, MODERATE (H): Primary | ICD-10-CM

## 2017-05-11 DIAGNOSIS — F41.1 GAD (GENERALIZED ANXIETY DISORDER): ICD-10-CM

## 2017-05-11 PROCEDURE — 90837 PSYTX W PT 60 MINUTES: CPT | Performed by: SOCIAL WORKER
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
## 2017-05-11 NOTE — MR AVS SNAPSHOT
"              After Visit Summary   5/11/2017    Myran Martinez    MRN: [id number]           Patient Information     Date Of Birth          1990        Visit Information        Provider Department      5/11/2017 9:00 AM Sandee Waller [facility]        Today's Diagnoses     Major depressive disorder, recurrent episode, moderate (H)    -  1    NICHOLE (generalized anxiety disorder)           Follow-ups after your visit        Your next 10 appointments already scheduled     May 16, 2017  9:30 AM CDT   (Arrive by 9:15 AM)   Return Visit with Sandee Waller [facility] ([facility])    [address] [facility] [id number] [phone]            Jun 06, 2017 10:30 AM CDT   (Arrive by 10:15 AM)   New Visit with DAVID Hammer   [facility] [place] [facility] ([facility])    [address] [place] [address] [phone]              Who to contact     If you have questions or need follow up information about today's clinic visit or your schedule please contact [facility] directly at [phone].  Normal or non-critical lab and imaging results will be communicated to you by MyChart, letter or phone within 4 business days after the clinic has received the results. If you do not hear from us within 7 days, please contact the clinic through [employer] or phone. If you have a critical or abnormal lab result, we will notify you by phone as soon as possible.  Submit refill requests through [employer] or call your pharmacy and they will forward the refill request to us. Please allow 3 business days for your refill to be completed.          Additional Information About Your Visit        MyChart Information     [employer] lets you send messages to your doctor, view your test results, renew your prescriptions, schedule appointments and more. To sign up, go to [email or web address] . Click on \"Log in\" on the left side of the screen, which " "will take you to the Welcome page. Then click on \"Sign up Now\" on the right side of the page.     You will be asked to enter the access code listed below, as well as some personal information. Please follow the directions to create your username and password.     Your access code is: [email or web address]  Expires: 2017  2:38 PM     Your access code will  in 90 days. If you need help or a new code, please call your [place] clinic or [phone].        Care EveryWhere ID     This is your Care EveryWhere ID. This could be used by other organizations to access your [place] medical records  [id number]         Blood Pressure from Last 3 Encounters:   17 118/62   17 119/81   03/10/17 116/72    Weight from Last 3 Encounters:   17 152 lb (68.9 kg)   17 153 lb (69.4 kg)   03/10/17 145 lb (65.8 kg)              Today, you had the following     No orders found for display       Primary Care Provider Office Phone # Fax #    Nivia Jimenes [phone]       [facility] [place] [address] [place] [address]  [place] [address]        Thank you!     Thank you for choosing [facility] [place]  for your care. Our goal is always to provide you with excellent care. Hearing back from our patients is one way we can continue to improve our services. Please take a few minutes to complete the written survey that you may receive in the mail after your visit with us. Thank you!             Your Updated Medication List - Protect others around you: Learn how to safely use, store and throw away your medicines at www.disposemymeds.org.          This list is accurate as of: 17  1:37 PM.  Always use your most recent med list.                   Brand Name Dispense Instructions for use    etonogestrel 68 MG Impl    IMPLANON/NEXPLANON     1 each by Subdermal route once       hydrOXYzine 25 MG capsule    VISTARIL    40 capsule    Take 1 capsule (25 mg) by mouth 3 times daily as needed for anxiety       " prenatal multivitamin  plus iron 27-0.8 MG Tabs per tablet     100 tablet    Take 1 tablet by mouth daily       ranitidine 300 MG tablet    ZANTAC    30 tablet    Take 1 tablet (300 mg) by mouth At Bedtime       sertraline 50 MG tablet    ZOLOFT    30 tablet    Take 1 tablet (50 mg) by mouth daily

## 2017-05-16 ENCOUNTER — OFFICE VISIT (OUTPATIENT)
Dept: BEHAVIORAL HEALTH | Facility: OTHER | Age: 27
End: 2017-05-16
Attending: SOCIAL WORKER
Payer: MEDICAID

## 2017-05-16 DIAGNOSIS — F33.1 MAJOR DEPRESSIVE DISORDER, RECURRENT EPISODE, MODERATE (H): Primary | ICD-10-CM

## 2017-05-16 DIAGNOSIS — F41.1 GAD (GENERALIZED ANXIETY DISORDER): ICD-10-CM

## 2017-05-16 PROCEDURE — 90834 PSYTX W PT 45 MINUTES: CPT | Performed by: SOCIAL WORKER

## 2017-05-16 NOTE — MR AVS SNAPSHOT
After Visit Summary   5/16/2017    Myrna Martinez    MRN: [id number]           Patient Information     Date Of Birth          1990        Visit Information        Provider Department      5/16/2017 9:30 AM Sandee Waller [facility]        Today's Diagnoses     Major depressive disorder, recurrent episode, moderate (H)    -  1    NICHOLE (generalized anxiety disorder)           Follow-ups after your visit        Your next 10 appointments already scheduled     May 18, 2017  9:30 AM CDT   (Arrive by 9:15 AM)   Return Visit with Sejal Hyman [facility] [place] ([facility] [place] [facility]    [address] [place] [address]  [place] [address] [phone]            May 23, 2017  1:00 PM CDT   (Arrive by 12:45 PM)   Return Visit with Sandee Waller [facility] [place] ([facility] [place] [facility])    [address] [place] [address]  [place] [id number] [phone]            May 23, 2017  1:30 PM CDT   (Arrive by 1:15 PM)   PHYSICAL with Nivia Jimenes MD   [facility] [place] ([facility] [place] [facility])    [address] [place] [address]  [place] [address]   [phone]            Jun 06, 2017 10:30 AM CDT   (Arrive by 10:15 AM)   New Visit with DAVID Hammer   [place] [facility] ([facility] [place] [facility])    [address]  [place] [phone]              Who to contact     If you have questions or need follow up information about today's clinic visit or your schedule please contact [facility] directly at [phone].  Normal or non-critical lab and imaging results will be communicated to you by MyChart, letter or phone within 4 business days after the clinic has received the results. If you do not hear from us within 7 days, please contact the clinic through MyChart or phone. If you have a critical or abnormal lab result, we will notify you by phone as soon as possible.  Submit refill requests through [email or web address] or call your pharmacy and they  "will forward the refill request to us. Please allow 3 business days for your refill to be completed.          Additional Information About Your Visit        [employer] Information     [employer] publishing lets you send messages to your doctor, view your test results, renew your prescriptions, schedule appointments and more. To sign up, go to [email or web address] . Click on \"Log in\" on the left side of the screen, which will take you to the Welcome page. Then click on \"Sign up Now\" on the right side of the page.     You will be asked to enter the access code listed below, as well as some personal information. Please follow the directions to create your username and password.     Your access code is: [email or web address]  Expires: 2017  2:38 PM     Your access code will  in 90 days. If you need help or a new code, please call your [place] clinic or [phone].        Care EveryWhere ID     This is your Care EveryWhere ID. This could be used by other organizations to access your [place] medical records  [id number]         Blood Pressure from Last 3 Encounters:   17 118/62   17 119/81   03/10/17 116/72    Weight from Last 3 Encounters:   17 152 lb (68.9 kg)   17 153 lb (69.4 kg)   03/10/17 145 lb (65.8 kg)              Today, you had the following     No orders found for display       Primary Care Provider Office Phone # Fax #    Nivia Jimenes [phone]       [facility] [place] [address]  [place] [address]        Thank you!     Thank you for choosing [facility] [place]  for your care. Our goal is always to provide you with excellent care. Hearing back from our patients is one way we can continue to improve our services. Please take a few minutes to complete the written survey that you may receive in the mail after your visit with us. Thank you!             Your Updated Medication List - Protect others around you: Learn how to safely use, store and throw away " your medicines at www.disposemymeds.org.          This list is accurate as of: 5/16/17 11:25 AM.  Always use your most recent med list.                   Brand Name Dispense Instructions for use    etonogestrel 68 MG Impl    IMPLANON/NEXPLANON     1 each by Subdermal route once       hydrOXYzine 25 MG capsule    VISTARIL    40 capsule    Take 1 capsule (25 mg) by mouth 3 times daily as needed for anxiety       prenatal multivitamin  plus iron 27-0.8 MG Tabs per tablet     100 tablet    Take 1 tablet by mouth daily       ranitidine 300 MG tablet    ZANTAC    30 tablet    Take 1 tablet (300 mg) by mouth At Bedtime       sertraline 50 MG tablet    ZOLOFT    30 tablet    Take 1 tablet (50 mg) by mouth daily

## 2017-05-16 NOTE — PROGRESS NOTES
[facility] Primary Care  May 16, 2017    Behavioral Health Clinician Progress Note    Patient Name: Myrna Martinez           Service Type: Individual      Service Location:   Face to Face in Clinic     Session Start Time: 9:25  Session End Time: 10:15      Session Length: 38 - 52      Attendees: Client    Visit Activities (Refresh list every visit): [facility] Only    Diagnostic Assessment Date: 11/17/16  Treatment Plan Review Date: 4/12/17 - 7/12/17  See Flowsheets for today's PHQ-9 and NICHOLE-7 results  Previous PHQ-9:   PHQ-9 SCORE 4/29/2016 11/9/2016 4/12/2017   Total Score 9 6 17     Previous NICHOLE-7:   NICHOLE-7 SCORE 11/9/2016 4/12/2017   Total Score 9 18       RAMO LEVEL:  RAMO Score (Last Two) 11/10/2016   RAMO Raw Score 27   Activation Score 47.4   RAMO Level 2     DATA  Extended Session (60+ minutes): No  Interactive Complexity: No  Crisis: No    Treatment Objective(s) Addressed in This Session:  Target Behavior(s): symptom management associated with mental health/ anxiety.    Anxiety: will experience a reduction in anxiety, will develop more effective coping skills to manage anxiety symptoms, will develop healthy cognitive patterns and beliefs and will increase ability to function adaptively    Current Stressors / Issues:  Patient reports continued stress and anxiety.  Mostly relationship based.  Has continued feeling of being Numb, but is also having more depression.  Indicates she's more tired lately than she has been in the past.    Reviewed different coping skills. Reviewed and taught BE-THIS mindfulness grounding tool.  Patient is going to attempt this throughout the week.  Also has begun journaling and making to do lists.    Progress on Treatment Objective(s) / Homework:  Minimal progress - ACTION (Actively working towards change); Intervened by reinforcing change plan / affirming steps taken    Motivational Interviewing    MI Intervention: Expressed Empathy/Understanding, Supported Autonomy,  Collaboration, Evocation, Permission to raise concern or advise, Open-ended questions, Reflections: simple and complex, Change talk (evoked) and Reframe     Change Talk Expressed by the Patient: Desire to change Ability to change Reasons to change Need to change    Provider Response to Change Talk: E - Evoked more info from patient about behavior change, A - Affirmed patient's thoughts, decisions, or attempts at behavior change, R - Reflected patient's change talk and S - Summarized patient's change talk statements    Also provided psychoeducation about behavioral health condition, symptoms, and treatment options    Care Plan review completed: No    Medication Review:  No changes to current psychiatric medication(s)    Medication Compliance:  Yes    Changes in Health Issues:   Yes: ENT/ sinus issues- states unable to breathe through nose.  Can not smell.    Chemical Use Review:   Substance Use: Chemical use reviewed, no active concerns identified   Reports that she started back using marijuana occasionally.     Tobacco Use: No current tobacco use.      Assessment: Current Emotional / Mental Status (status of significant symptoms):  Risk status (Self / Other harm or suicidal ideation)  Patient has had a history of suicidal ideation: 1x previously about 2 years ago. and suicide attempts: x1  Patient denies current fears or concerns for personal safety.  Patient denies current or recent suicidal ideation or behaviors.  Patient denies current or recent homicidal ideation or behaviors.  Patient denies current or recent self injurious behavior or ideation.  Patient denies other safety concerns.  Patient reports there are firearms in the house. The firearms are secured in a locked space  A safety and risk management plan has not been developed at this time, however client was given the after-hours number / 911 should there be a change in any of these risk factors.    Appearance:   Appropriate   Eye Contact:   Fair    Psychomotor Behavior: Normal  Retarded (Slowed)   Attitude:   Cooperative   Orientation:   All  Speech   Rate / Production: Normal    Volume:  Normal   Mood:    Sad   Affect:    Flat   Thought Content:  Clear   Thought Form:  Coherent  Logical   Insight:    Good     Diagnoses:  1. Major depressive disorder, recurrent episode, moderate (H)    2. NICHOLE (generalized anxiety disorder)      Collateral Reports Completed:  Not Applicable  Previous referral made to counseling department for couples counseling.    Plan: (Homework, other):  Patient was given information about behavioral services and encouraged to schedule a follow up appointment with the clinic [facility] in 1 week.  Also set up follow up appointment with [facility] to review MA and housing, scheduled for Thursday 5/18.  She was also given Cognitive Behavioral Therapy skills to practice when experiencing anxiety.  CD Recommendations: Maintain Sobriety, decrease substance use.  DARLING Barboza, [facility]

## 2017-05-18 ENCOUNTER — OFFICE VISIT (OUTPATIENT)
Dept: BEHAVIORAL HEALTH | Facility: OTHER | Age: 27
End: 2017-05-18

## 2017-05-18 DIAGNOSIS — F48.9 DEFERRED DIAGNOSIS ON AXIS I: Primary | ICD-10-CM

## 2017-05-18 NOTE — PROGRESS NOTES
Behavioral Health Home Services         Social Work Care Navigator Note      Patient: Myrna Martinez  Date: May 18, 2017  Preferred Name: Myrna    Previous PHQ-9:   PHQ-9 SCORE 4/29/2016 11/9/2016 4/12/2017   Total Score 9 6 17     Previous NICHOLE-7:   NICHOLE-7 SCORE 11/9/2016 4/12/2017   Total Score 9 18     RAMO LEVEL:  RAMO Score (Last Two) 11/10/2016   RAMO Raw Score 27   Activation Score 47.4   RAMO Level 2       Preferred Contact: @[facility]([id number])@  Type of Contact Today: Face to Face in Clinic      Data: (subjective / Objective):MA lapsed 5/1. Patient received reminder to sign and send in-but put it aside during difficult time with significant other. Made appt with patient financial 5/22 to resubmit.  Patient Goals Areas:    Patient Stated Goals:   To write down  tasks , appt,  Recent ED/IP Admission or Discharge?   None  Patient Goals:  Goal Areas: Mental Health  Patient stated goals: I will learn more about myself through thinking about my priorities, goals and values for my life.  I will do this by using the big picture application DBT hand out.    Recent ED/IP Admission or Discharge?   None    Objectives Addressed Today:  Still on waiting list for [employer]-gave her info for other local low income housing. Made appt for 5/22 for f/u    Current Stressors / Issues:  Working through end of relationship.  Housing-living with mother    Intervention:  Motivational Interviewing: Expressed Empathy/Understanding, Supported Autonomy, Collaboration, Evocation and Permission to raise concern or advise   Target Behavior(s): Explored and resolved challenges related to taking anti-depressants as prescribed    Assessment: (Progress on Goals / Homework):  Will call low-income housing managers this weekend    Plan: (Homework, other):  Patient was encouraged to continue to seek condition-related information and education.      Scheduled a Clinic follow up appointment with INA RIGGINS in 1 week     Patient has set self-identified goals and  will monitor progress until the next appointment on: 05/22/17.     Sejal Hyman, Social Work Care Coordinator               Next 5 appointments (look out 90 days)     May 22, 2017 11:00 AM CDT   (Arrive by 10:45 AM)   Return Visit with Sejal Hyman, [facility] [place] ([facility] [place] [facility]    [address] [place] [address]  [place] [address]   [phone]            May 23, 2017  1:00 PM CDT   (Arrive by 12:45 PM)   Return Visit with Sandee Waller [facility] [place] ([facility] [place] [facility])    [address] [place] [address]  [place] [address]   [phone]            May 23, 2017  1:30 PM CDT   (Arrive by 1:15 PM)   PHYSICAL with Nivia Jimenes MD   [facility] [place] ([facility] [place] [facility])    [address] [place] [address]  [place] [address]   [phone]

## 2017-05-18 NOTE — MR AVS SNAPSHOT
After Visit Summary   5/18/2017    Myrna Martinez    MRN: [id number]           Patient Information     Date Of Birth          1990        Visit Information        Provider Department      5/18/2017 9:30 AM Sejal Hyman [facility]        Today's Diagnoses     Deferred diagnosis on axis I    -  1       Follow-ups after your visit        Your next 10 appointments already scheduled     May 22, 2017 11:00 AM CDT   (Arrive by 10:45 AM)   Return Visit with Sejal Hyman [facility] [place] ([facility] [place] [facility])    [address] [place] [address]  [place] [address]   [phone]            May 23, 2017  1:00 PM CDT   (Arrive by 12:45 PM)   Return Visit with Sandee Waller [facility] [place] ([facility] [place] [facility])    [address] [place] [address]  [place] [address] [phone]            May 23, 2017  1:30 PM CDT   (Arrive by 1:15 PM)   PHYSICAL with Nivia Jimenes MD   [facility] [place] ([facility] [place] [facility])    [address] [place] [address]  [place] [address]   [phone]            Jun 06, 2017 10:30 AM CDT   (Arrive by 10:15 AM)   New Visit with DAVID Hammer   [facility] [place] [facility] [place] [facility]    [address] [place] [address]  [place] [id number] [phone]              Who to contact     If you have questions or need follow up information about today's clinic visit or your schedule please contact [facility] directly at [phone].  Normal or non-critical lab and imaging results will be communicated to you by MyChart, letter or phone within 4 business days after the clinic has received the results. If you do not hear from us within 7 days, please contact the clinic through MyChart or phone. If you have a critical or abnormal lab result, we will notify you by phone as soon as possible.  Submit refill requests through Tbricks or call your pharmacy and they will forward the refill request to us. Please allow 3 business days for  "your refill to be completed.          Additional Information About Your Visit        Wistonehart Information     [employer] lets you send messages to your doctor, view your test results, renew your prescriptions, schedule appointments and more. To sign up, go to [email or web address] . Click on \"Log in\" on the left side of the screen, which will take you to the Welcome page. Then click on \"Sign up Now\" on the right side of the page.     You will be asked to enter the access code listed below, as well as some personal information. Please follow the directions to create your username and password.     Your access code is: [email or web address]  Expires: 2017  2:38 PM     Your access code will  in 90 days. If you need help or a new code, please call your [place] clinic or [phone].        Care EveryWhere ID     This is your Care EveryWhere ID. This could be used by other organizations to access your [place] medical records  [id number]         Blood Pressure from Last 3 Encounters:   17 118/62   17 119/81   03/10/17 116/72    Weight from Last 3 Encounters:   17 152 lb (68.9 kg)   17 153 lb (69.4 kg)   03/10/17 145 lb (65.8 kg)              Today, you had the following     No orders found for display       Primary Care Provider Office Phone # Fax #    Nivia Jimenes [phone]       [facility] [address] [place] [address]        Thank you!     Thank you for choosing [facility]  for your care. Our goal is always to provide you with excellent care. Hearing back from our patients is one way we can continue to improve our services. Please take a few minutes to complete the written survey that you may receive in the mail after your visit with us. Thank you!             Your Updated Medication List - Protect others around you: Learn how to safely use, store and throw away your medicines at www.disposemymeds.org.          This list is accurate as " of: 5/18/17 10:24 AM.  Always use your most recent med list.                   Brand Name Dispense Instructions for use    etonogestrel 68 MG Impl    IMPLANON/NEXPLANON     1 each by Subdermal route once       hydrOXYzine 25 MG capsule    VISTARIL    40 capsule    Take 1 capsule (25 mg) by mouth 3 times daily as needed for anxiety       prenatal multivitamin  plus iron 27-0.8 MG Tabs per tablet     100 tablet    Take 1 tablet by mouth daily       ranitidine 300 MG tablet    ZANTAC    30 tablet    Take 1 tablet (300 mg) by mouth At Bedtime       sertraline 50 MG tablet    ZOLOFT    30 tablet    Take 1 tablet (50 mg) by mouth daily

## 2017-05-22 ENCOUNTER — OFFICE VISIT (OUTPATIENT)
Dept: BEHAVIORAL HEALTH | Facility: OTHER | Age: 27
End: 2017-05-22
Payer: MEDICAID

## 2017-05-22 ENCOUNTER — OFFICE VISIT (OUTPATIENT)
Dept: BEHAVIORAL HEALTH | Facility: OTHER | Age: 27
End: 2017-05-22
Attending: SOCIAL WORKER
Payer: MEDICAID

## 2017-05-22 DIAGNOSIS — F41.1 GAD (GENERALIZED ANXIETY DISORDER): ICD-10-CM

## 2017-05-22 DIAGNOSIS — F48.9 DEFERRED DIAGNOSIS ON AXIS I: Primary | ICD-10-CM

## 2017-05-22 DIAGNOSIS — F33.1 MAJOR DEPRESSIVE DISORDER, RECURRENT EPISODE, MODERATE (H): Primary | ICD-10-CM

## 2017-05-22 PROCEDURE — 90840 PSYTX CRISIS EA ADDL 30 MIN: CPT | Performed by: SOCIAL WORKER

## 2017-05-22 PROCEDURE — 90839 PSYTX CRISIS INITIAL 60 MIN: CPT | Performed by: SOCIAL WORKER

## 2017-05-22 NOTE — MR AVS SNAPSHOT
After Visit Summary   5/22/2017    Myrna Martinez    MRN: [id number]           Patient Information     Date Of Birth          1990        Visit Information        Provider Department      5/22/2017 11:30 AM [facility]        Today's Diagnoses     Major depressive disorder, recurrent episode, moderate (H)    -  1    NICHOLE (generalized anxiety disorder)           Follow-ups after your visit        Your next 10 appointments already scheduled     May 23, 2017  1:00 PM CDT   (Arrive by 12:45 PM)   Return Visit with DARLING Mcfadden   [facility] [place] [facility] [place] [facility]    [address] [place], [place] [address] [id number] [phone]            May 23, 2017  1:30 PM CDT   (Arrive by 1:15 PM)   PHYSICAL with Nivia Jimenes MD   [facility] [place] [facility] [place] [facility]    [address] [place], [place] [address] [address]   [phone]            Jun 06, 2017 10:30 AM CDT   (Arrive by 10:15 AM)   New Visit with DAVID Hammer   [place] [facility] ([facility] [place] [facility]    [address] [place], [place] [address] [phone] [phone]              Who to contact     If you have questions or need follow up information about today's clinic visit or your schedule please contact [facility] directly at [phone].  Normal or non-critical lab and imaging results will be communicated to you by MyChart, letter or phone within 4 business days after the clinic has received the results. If you do not hear from us within 7 days, please contact the clinic through MyChart or phone. If you have a critical or abnormal lab result, we will notify you by phone as soon as possible.  Submit refill requests through Findery or call your pharmacy and they will forward the refill request to us. Please allow 3 business days for your refill to be completed.          Additional Information About Your Visit        MyChart Information     [employer] lets you send  "messages to your doctor, view your test results, renew your prescriptions, schedule appointments and more. To sign up, go to [email or web address] . Click on \"Log in\" on the left side of the screen, which will take you to the Welcome page. Then click on \"Sign up Now\" on the right side of the page.     You will be asked to enter the access code listed below, as well as some personal information. Please follow the directions to create your username and password.     Your access code is: [email or web address]  Expires: 2017  2:38 PM     Your access code will  in 90 days. If you need help or a new code, please call your [place] clinic or [phone].        Care EveryWhere ID     This is your Care EveryWhere ID. This could be used by other organizations to access your [place] medical records  [id number]         Blood Pressure from Last 3 Encounters:   17 118/62   17 119/81   03/10/17 116/72    Weight from Last 3 Encounters:   17 152 lb (68.9 kg)   17 153 lb (69.4 kg)   03/10/17 145 lb (65.8 kg)              Today, you had the following     No orders found for display       Primary Care Provider Office Phone # Fax #    Nivia Jimenes [phone]       [facility] [place] [address] [place] [address]        Thank you!     Thank you for choosing [facility] [place]  for your care. Our goal is always to provide you with excellent care. Hearing back from our patients is one way we can continue to improve our services. Please take a few minutes to complete the written survey that you may receive in the mail after your visit with us. Thank you!             Your Updated Medication List - Protect others around you: Learn how to safely use, store and throw away your medicines at www.disposemymeds.org.          This list is accurate as of: 17  3:23 PM.  Always use your most recent med list.                   Brand Name Dispense Instructions for use    " etonogestrel 68 MG Impl    IMPLANON/NEXPLANON     1 each by Subdermal route once       hydrOXYzine 25 MG capsule    VISTARIL    40 capsule    Take 1 capsule (25 mg) by mouth 3 times daily as needed for anxiety       prenatal multivitamin  plus iron 27-0.8 MG Tabs per tablet     100 tablet    Take 1 tablet by mouth daily       ranitidine 300 MG tablet    ZANTAC    30 tablet    Take 1 tablet (300 mg) by mouth At Bedtime       sertraline 50 MG tablet    ZOLOFT    30 tablet    Take 1 tablet (50 mg) by mouth daily

## 2017-05-22 NOTE — PROGRESS NOTES
Behavioral Health Home Services         Social Work Care Navigator Note      Patient: Myrna Martinez  Date: May 22, 2017  Preferred Name: Myrna    Previous PHQ-9:   PHQ-9 SCORE 4/29/2016 11/9/2016 4/12/2017   Total Score 9 6 17     Previous NICHOLE-7:   NICHOLE-7 SCORE 11/9/2016 4/12/2017   Total Score 9 18     RAMO LEVEL:  RAMO Score (Last Two) 11/10/2016   RAMO Raw Score 27   Activation Score 47.4   RAMO Level 2       Preferred Contact: @[facility]([id number])@  Type of Contact Today: Face to Face in Clinic      Data: (subjective / Objective):  Patient Goals Areas:    Patient Stated Goals:  To write down  tasks , appt,  Recent ED/IP Admission or Discharge?   None  Patient Goals:  Goal Areas: Mental Health  Patient stated goals: I will learn more about myself through thinking about my priorities, goals and values for my life.  I will do this by using the big picture application DBT hand out.    Recent ED/IP Admission or Discharge?   None    Objectives Addressed Today:  See [facility] notes    Current Stressors / Issues:  See [facility] notes on this date        Assessment: (Progress on Goals / Homework):  Call [facility] re:appt, Call [employer] for family situation.    Plan: (Homework, other):  Patient was encouraged to continue to seek condition-related information and education.      Scheduled a Clinic follow up appointment with [facility] in one day    Patient has set self-identified goals and will monitor progress until the next appointment on: 5/23/17.     Sejal Hyman, Social Work Care Coordinator               Next 5 appointments (look out 90 days)     May 23, 2017  1:00 PM CDT   (Arrive by 12:45 PM)   Return Visit with Sandee Waller [facility] [place] [facility] [place] [facility]    [address] [place] [address]   [phone]            May 23, 2017  1:30 PM CDT   (Arrive by 1:15 PM)   PHYSICAL with Nivia Jimenes MD   [facility] [place] [facility] [place] [facility]    [address] [place] [address]  [place] [address]   [phone]

## 2017-05-22 NOTE — MR AVS SNAPSHOT
After Visit Summary   5/22/2017    Myrna Martinez    MRN: [id number]           Patient Information     Date Of Birth          1990        Visit Information        Provider Department      5/22/2017 11:00 AM Sejal Hyman [facility]        Today's Diagnoses     Deferred diagnosis on axis I    -  1       Follow-ups after your visit        Your next 10 appointments already scheduled     May 23, 2017  1:00 PM CDT   (Arrive by 12:45 PM)   Return Visit with Sandee Waller [facility] [place] [facility] [place] [facility]    [address] [place] [address]  [place] [address] [phone]            May 23, 2017  1:30 PM CDT   (Arrive by 1:15 PM)   PHYSICAL with Nivia Jimenes MD   [facility] [place] [facility] [place] [facility]    [address] [place] [address]  [place] [address]   [phone]            Jun 06, 2017 10:30 AM CDT   (Arrive by 10:15 AM)   New Visit with DAVID Hammer   [place] [facility] [place] [facility]    [address] [place], [place] [address] [place] [address] [phone]              Who to contact     If you have questions or need follow up information about today's clinic visit or your schedule please contact [facility] directly at [phone].  Normal or non-critical lab and imaging results will be communicated to you by VOIQhart, letter or phone within 4 business days after the clinic has received the results. If you do not hear from us within 7 days, please contact the clinic through MyChart or phone. If you have a critical or abnormal lab result, we will notify you by phone as soon as possible.  Submit refill requests through MentorCloud or call your pharmacy and they will forward the refill request to us. Please allow 3 business days for your refill to be completed.          Additional Information About Your Visit        MyChart Information     [employer]entor[employer] lets you send messages to your doctor, view your test results, renew your prescriptions,  "schedule appointments and more. To sign up, go to [email or web address] . Click on \"Log in\" on the left side of the screen, which will take you to the Welcome page. Then click on \"Sign up Now\" on the right side of the page.     You will be asked to enter the access code listed below, as well as some personal information. Please follow the directions to create your username and password.     Your access code is: [email or web address]  Expires: 2017  2:38 PM     Your access code will  in 90 days. If you need help or a new code, please call your [place] clinic or [phone].        Care EveryWhere ID     This is your Care EveryWhere ID. This could be used by other organizations to access your [place] medical records  [id number]         Blood Pressure from Last 3 Encounters:   17 118/62   17 119/81   03/10/17 116/72    Weight from Last 3 Encounters:   17 152 lb (68.9 kg)   17 153 lb (69.4 kg)   03/10/17 145 lb (65.8 kg)              Today, you had the following     No orders found for display       Primary Care Provider Office Phone # Fax #    Nivia Jimenes [phone]       [facility] [place] [address]  [place] [address]        Thank you!     Thank you for choosing [facility] [place]  for your care. Our goal is always to provide you with excellent care. Hearing back from our patients is one way we can continue to improve our services. Please take a few minutes to complete the written survey that you may receive in the mail after your visit with us. Thank you!             Your Updated Medication List - Protect others around you: Learn how to safely use, store and throw away your medicines at www.disposemymeds.org.          This list is accurate as of: 17 12:53 PM.  Always use your most recent med list.                   Brand Name Dispense Instructions for use    etonogestrel 68 MG Impl    IMPLANON/NEXPLANON     1 each by Subdermal route once    "    hydrOXYzine 25 MG capsule    VISTARIL    40 capsule    Take 1 capsule (25 mg) by mouth 3 times daily as needed for anxiety       prenatal multivitamin  plus iron 27-0.8 MG Tabs per tablet     100 tablet    Take 1 tablet by mouth daily       ranitidine 300 MG tablet    ZANTAC    30 tablet    Take 1 tablet (300 mg) by mouth At Bedtime       sertraline 50 MG tablet    ZOLOFT    30 tablet    Take 1 tablet (50 mg) by mouth daily
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
## 2017-05-23 ENCOUNTER — TELEPHONE (OUTPATIENT)
Dept: BEHAVIORAL HEALTH | Facility: OTHER | Age: 27
End: 2017-05-23

## 2017-05-23 ENCOUNTER — OFFICE VISIT (OUTPATIENT)
Dept: FAMILY MEDICINE | Facility: OTHER | Age: 27
End: 2017-05-23
Attending: FAMILY MEDICINE
Payer: MEDICAID

## 2017-05-23 VITALS
HEART RATE: 89 BPM | WEIGHT: 155 LBS | HEIGHT: 61 IN | BODY MASS INDEX: 29.27 KG/M2 | DIASTOLIC BLOOD PRESSURE: 70 MMHG | RESPIRATION RATE: 17 BRPM | TEMPERATURE: 98.2 F | SYSTOLIC BLOOD PRESSURE: 118 MMHG | OXYGEN SATURATION: 100 %

## 2017-05-23 DIAGNOSIS — B96.89 BV (BACTERIAL VAGINOSIS): ICD-10-CM

## 2017-05-23 DIAGNOSIS — Z11.3 SCREENING FOR STDS (SEXUALLY TRANSMITTED DISEASES): ICD-10-CM

## 2017-05-23 DIAGNOSIS — F33.1 MODERATE EPISODE OF RECURRENT MAJOR DEPRESSIVE DISORDER (H): Primary | ICD-10-CM

## 2017-05-23 DIAGNOSIS — R45.86 MOOD SWINGS: ICD-10-CM

## 2017-05-23 DIAGNOSIS — F41.1 GAD (GENERALIZED ANXIETY DISORDER): ICD-10-CM

## 2017-05-23 DIAGNOSIS — N76.0 BV (BACTERIAL VAGINOSIS): ICD-10-CM

## 2017-05-23 LAB
ERYTHROCYTE [DISTWIDTH] IN BLOOD BY AUTOMATED COUNT: 12.8 % (ref 10–15)
HCT VFR BLD AUTO: 40.2 % (ref 35–47)
HGB BLD-MCNC: 14.1 G/DL (ref 11.7–15.7)
MCH RBC QN AUTO: 31.2 PG (ref 26.5–33)
MCHC RBC AUTO-ENTMCNC: 35.1 G/DL (ref 31.5–36.5)
MCV RBC AUTO: 89 FL (ref 78–100)
MICRO REPORT STATUS: ABNORMAL
PLATELET # BLD AUTO: 276 10E9/L (ref 150–450)
RBC # BLD AUTO: 4.52 10E12/L (ref 3.8–5.2)
SPECIMEN SOURCE: ABNORMAL
TSH SERPL DL<=0.005 MIU/L-ACNC: 1.63 MU/L (ref 0.4–4)
WBC # BLD AUTO: 10.7 10E9/L (ref 4–11)
WET PREP SPEC: ABNORMAL

## 2017-05-23 PROCEDURE — 86780 TREPONEMA PALLIDUM: CPT | Mod: ZL | Performed by: FAMILY MEDICINE

## 2017-05-23 PROCEDURE — 87389 HIV-1 AG W/HIV-1&-2 AB AG IA: CPT | Mod: ZL | Performed by: FAMILY MEDICINE

## 2017-05-23 PROCEDURE — 87210 SMEAR WET MOUNT SALINE/INK: CPT | Mod: ZL | Performed by: FAMILY MEDICINE

## 2017-05-23 PROCEDURE — 87340 HEPATITIS B SURFACE AG IA: CPT | Mod: ZL | Performed by: FAMILY MEDICINE

## 2017-05-23 PROCEDURE — G0476 HPV COMBO ASSAY CA SCREEN: HCPCS | Mod: ZL | Performed by: FAMILY MEDICINE

## 2017-05-23 PROCEDURE — 36415 COLL VENOUS BLD VENIPUNCTURE: CPT | Mod: ZL | Performed by: FAMILY MEDICINE

## 2017-05-23 PROCEDURE — 87591 N.GONORRHOEAE DNA AMP PROB: CPT | Mod: ZL | Performed by: FAMILY MEDICINE

## 2017-05-23 PROCEDURE — G0123 SCREEN CERV/VAG THIN LAYER: HCPCS | Mod: ZL | Performed by: FAMILY MEDICINE

## 2017-05-23 PROCEDURE — 99395 PREV VISIT EST AGE 18-39: CPT | Performed by: FAMILY MEDICINE

## 2017-05-23 PROCEDURE — 85027 COMPLETE CBC AUTOMATED: CPT | Mod: ZL | Performed by: FAMILY MEDICINE

## 2017-05-23 PROCEDURE — 87491 CHLMYD TRACH DNA AMP PROBE: CPT | Mod: ZL | Performed by: FAMILY MEDICINE

## 2017-05-23 PROCEDURE — 84443 ASSAY THYROID STIM HORMONE: CPT | Mod: ZL | Performed by: FAMILY MEDICINE

## 2017-05-23 PROCEDURE — 99000 SPECIMEN HANDLING OFFICE-LAB: CPT | Performed by: FAMILY MEDICINE

## 2017-05-23 RX ORDER — HYDROXYZINE PAMOATE 25 MG/1
25 CAPSULE ORAL 4 TIMES DAILY PRN
Qty: 60 CAPSULE | Refills: 1 | Status: SHIPPED | OUTPATIENT
Start: 2017-05-23 | End: 2018-05-10

## 2017-05-23 RX ORDER — SERTRALINE HYDROCHLORIDE 100 MG/1
100 TABLET, FILM COATED ORAL DAILY
Qty: 30 TABLET | Refills: 1 | Status: SHIPPED | OUTPATIENT
Start: 2017-05-23 | End: 2017-06-21

## 2017-05-23 ASSESSMENT — ANXIETY QUESTIONNAIRES
2. NOT BEING ABLE TO STOP OR CONTROL WORRYING: NEARLY EVERY DAY
7. FEELING AFRAID AS IF SOMETHING AWFUL MIGHT HAPPEN: SEVERAL DAYS
5. BEING SO RESTLESS THAT IT IS HARD TO SIT STILL: NOT AT ALL
3. WORRYING TOO MUCH ABOUT DIFFERENT THINGS: NEARLY EVERY DAY
1. FEELING NERVOUS, ANXIOUS, OR ON EDGE: NEARLY EVERY DAY
IF YOU CHECKED OFF ANY PROBLEMS ON THIS QUESTIONNAIRE, HOW DIFFICULT HAVE THESE PROBLEMS MADE IT FOR YOU TO DO YOUR WORK, TAKE CARE OF THINGS AT HOME, OR GET ALONG WITH OTHER PEOPLE: SOMEWHAT DIFFICULT
6. BECOMING EASILY ANNOYED OR IRRITABLE: SEVERAL DAYS
GAD7 TOTAL SCORE: 14

## 2017-05-23 ASSESSMENT — PAIN SCALES - GENERAL: PAINLEVEL: NO PAIN (0)

## 2017-05-23 ASSESSMENT — PATIENT HEALTH QUESTIONNAIRE - PHQ9: 5. POOR APPETITE OR OVEREATING: NEARLY EVERY DAY

## 2017-05-23 NOTE — TELEPHONE ENCOUNTER
"[facility]  @[email or web address]([id number])@      Social Work Care Navigator Note      Patient: Myrna Martinez  Date: May 23, 2017  Preferred Name: Myrna    Previous PHQ-9:   PHQ-9 SCORE 4/29/2016 11/9/2016 4/12/2017   Total Score 9 6 17     Previous NICHOLE-7:   NICHOLE-7 SCORE 11/9/2016 4/12/2017   Total Score 9 18     RAMO LEVEL:  RAMO Score (Last Two) 11/10/2016   RAMO Raw Score 27   Activation Score 47.4   RAMO Level 2       Preferred Contact: @[facility]([id number])@  Type of Contact Today: Phone call (not reached/unavailable)      Data: (subjective / Objective): \"I am feeling much better after meeting with you all.\"  Patient Goals Areas: @FLOW([id number])@  Patient Stated Goals: To write down  tasks , appt,  Recent ED/IP Admission or Discharge?   None  Recent ED/IP Admission or Discharge?   None    Patient Goals:  [unfilled]      [facility] Core Service Provided:  Comprehensive Care Management: utilized the electronic medical record / patient registry to identify and support patient's health conditions / needs more effectively   Care Coordination: provided care management services/referrals necessary to ensure patient and their identified supports have access to medical, behavioral health, pharmacology and recovery support services.  Ensured that patient's care is integrated across all settings and services.     Current Stressors / Issues / Care Plan Objective Addressed Today:  Patient was able to apply for housing at [facility], rescheduled Appt with [facility] for 5/26        Plan: (Homework, other):  Patient was encouraged to continue to seek condition-related information and education.      Scheduled a Clinic follow up appointment with [facility] in 1 week     Patient has set self-identified goals and will monitor progress until the next appointment on: 5/26/17.     Sejal Hyman, Social Work Care Coordinator               Next 5 appointments (look out 90 days)     May 23, 2017  1:30 PM CDT   (Arrive by 1:15 PM)   PHYSICAL with " Nivia Jimenes MD   [facility] [place] ([facility])    [address] [place] [address]  [place] [address]   [phone]            May 26, 2017  9:00 AM CDT   (Arrive by 8:45 AM)   Return Visit with DARLING Mcfadden   [facility] ([facility])    [address] [place] [address]  [place] [id number] [phone]

## 2017-05-23 NOTE — NURSING NOTE
"Chief Complaint   Patient presents with     Physical       Initial /70  Pulse 89  Temp 98.2  F (36.8  C)  Resp 17  Ht 5' 1.25\" (1.556 m)  Wt 155 lb (70.3 kg)  SpO2 100%  BMI 29.05 kg/m2 Estimated body mass index is 29.05 kg/(m^2) as calculated from the following:    Height as of this encounter: 5' 1.25\" (1.556 m).    Weight as of this encounter: 155 lb (70.3 kg).  Medication Reconciliation: complete  "

## 2017-05-23 NOTE — PROGRESS NOTES
SUBJECTIVE:     CC: Myrna Martinez is an 26 year old woman who presents for preventive health visit.     Healthy Habits:    Do you get at least three servings of calcium containing foods daily (dairy, green leafy vegetables, etc.)? yes    Amount of exercise or daily activities, outside of work: none     Problems taking medications regularly No    Medication side effects: fatigue from anxiety medication     Have you had an eye exam in the past two years? no    Do you see a dentist twice per year? yes    Do you have sleep apnea, excessive snoring or daytime drowsiness?no          Depression and Anxiety Follow-Up    Status since last visit: Improved but would like to change anxiety medication to tablet so patient can split in half     Other associated symptoms:None    Complicating factors:     Significant life event: Yes-  Broke up with boyfriend, dealing with child support issues, lots of stress      Current substance abuse: None    PHQ-9 SCORE 4/29/2016 11/9/2016 4/12/2017   Total Score 9 6 17     NICHOLE-7 SCORE 11/9/2016 4/12/2017   Total Score 9 18        PHQ-9  English      PHQ-9   Any Language     GAD7       Today's PHQ-2 Score: No flowsheet data found.    Abuse: Current or Past(Physical, Sexual or Emotional)- No  Do you feel safe in your environment - NOT APPLICABLE    Social History   Substance Use Topics     Smoking status: Never Smoker     Smokeless tobacco: Never Used     Alcohol use 0.0 oz/week     0 Standard drinks or equivalent per week      Comment: 2x/month     The patient does not drink >3 drinks per day nor >7 drinks per week.    No results for input(s): CHOL, HDL, LDL, TRIG, CHOLHDLRATIO, NHDL in the last 98916 hours.    Reviewed orders with patient.  Reviewed health maintenance and updated orders accordingly - Yes    Mammo Decision Support:  Mammogram not appropriate for this patient based on age.    Pertinent mammograms are reviewed under the imaging tab.  History of abnormal Pap smear: NO - age  "21-29 PAP every 3 years recommended    Reviewed and updated as needed this visit by clinical staff  Allergies  Meds         Reviewed and updated as needed this visit by Provider        Past Medical History:   Diagnosis Date     Anxiety      Chronic headache      Depression     SI at [facility] 2015, was homeless at the time     Herpes genitalis in women     takes acyclovir     Seasonal allergies       Past Surgical History:   Procedure Laterality Date     APPENDECTOMY       MAMMOPLASTY REDUCTION       Obstetric History       T1      TAB0   SAB0   E0   M0   L1       # Outcome Date GA Lbr Andrea/2nd Weight Sex Delivery Anes PTL Lv   1 Term               Obstetric Comments   Breast and bottle fed       ROS:  C: NEGATIVE for fever, chills, change in weight  I: NEGATIVE for worrisome rashes, moles or lesions  E: NEGATIVE for vision changes or irritation  ENT: NEGATIVE for ear, mouth and throat problems  R: NEGATIVE for significant cough or SOB  B: NEGATIVE for masses, tenderness or discharge  CV: NEGATIVE for chest pain, palpitations or peripheral edema  GI: NEGATIVE for nausea, abdominal pain, heartburn, or change in bowel habits  : NEGATIVE for unusual urinary or vaginal symptoms. Periods are regular.  M: NEGATIVE for significant arthralgias or myalgia  N: NEGATIVE for weakness, dizziness or paresthesias  PSYCHIATRIC: POSITIVE forconcentration difficulty, Hx anxiety, Hx depression, fatigue and stress with recent break up and moving out    Problem list, Medication list, Allergies, and Medical/Social/Surgical histories reviewed in [facility] and updated as appropriate.  Labs reviewed in EPIC  OBJECTIVE:     /70  Pulse 89  Temp 98.2  F (36.8  C)  Resp 17  Ht 5' 1.25\" (1.556 m)  Wt 155 lb (70.3 kg)  SpO2 100%  BMI 29.05 kg/m2  EXAM:  GENERAL: healthy, alert and no distress  EYES: Eyes grossly normal to inspection, PERRL and conjunctivae and sclerae normal  HENT: ear canals and TM's normal, nose and " mouth without ulcers or lesions  NECK: no adenopathy, no asymmetry, masses, or scars and thyroid normal to palpation  RESP: lungs clear to auscultation - no rales, rhonchi or wheezes  BREAST: normal without masses, tenderness or nipple discharge and no palpable axillary masses or adenopathy  CV: regular rate and rhythm, normal S1 S2, no S3 or S4, no murmur, click or rub, no peripheral edema and peripheral pulses strong  ABDOMEN: soft, nontender, no hepatosplenomegaly, no masses and bowel sounds normal   (female): normal female external genitalia, normal urethral meatus, vaginal mucosa pink, moist, well rugated, and normal cervix/adnexa/uterus without masses or discharge, pap, wet prep and GC done  MS: no gross musculoskeletal defects noted, no edema  SKIN: no suspicious lesions or rashes  NEURO: Normal strength and tone, mentation intact and speech normal  PSYCH: mentation appears normal, affect normal/bright    ASSESSMENT/PLAN:     1. Moderate episode of recurrent major depressive disorder (H)  Increase to 100mg and f/u 6 wks  - sertraline (ZOLOFT) 100 MG tablet; Take 1 tablet (100 mg) by mouth daily  Dispense: 30 tablet; Refill: 1    2. NICHOLE (generalized anxiety disorder)  See above  - sertraline (ZOLOFT) 100 MG tablet; Take 1 tablet (100 mg) by mouth daily  Dispense: 30 tablet; Refill: 1  - hydrOXYzine (VISTARIL) 25 MG capsule; Take 1 capsule (25 mg) by mouth 4 times daily as needed for anxiety  Dispense: 60 capsule; Refill: 1  - TSH with free T4 reflex  - CBC with platelets    3. Mood swings (H)  Talk with pharmacy about opening capsules  - hydrOXYzine (VISTARIL) 25 MG capsule; Take 1 capsule (25 mg) by mouth 4 times daily as needed for anxiety  Dispense: 60 capsule; Refill: 1  - TSH with free T4 reflex  - CBC with platelets    4. Fatigue  2nd to above    5. Screening for STDs (sexually transmitted diseases)    - HIV Antigen Antibody Combo  - Hepatitis B surface antigen  - Anti Treponema  - Neisseria  "gonorrhoeae PCR  - Chlamydia trachomatis PCR  - Wet prep    COUNSELING:   Reviewed preventive health counseling, as reflected in patient instructions       Regular exercise       Healthy diet/nutrition       Vision screening       Hearing screening         reports that she has never smoked. She has never used smokeless tobacco.    Estimated body mass index is 27.8 kg/(m^2) as calculated from the following:    Height as of 4/12/17: 5' 2\" (1.575 m).    Weight as of 4/12/17: 152 lb (68.9 kg).       Counseling Resources:  ATP IV Guidelines  Pooled Cohorts Equation Calculator  Breast Cancer Risk Calculator  FRAX Risk Assessment  ICSI Preventive Guidelines  Dietary Guidelines for Americans, 2010  USDA's MyPlate  ASA Prophylaxis  Lung CA Screening    Nivia Jimenes MD  [facility] [place]  "

## 2017-05-23 NOTE — MR AVS SNAPSHOT
After Visit Summary   5/23/2017    Myrna Martinez    MRN: [id number]           Patient Information     Date Of Birth          1990        Visit Information        Provider Department      5/23/2017 1:30 PM Nivia Jimenes MD [facility]        Today's Diagnoses     Moderate episode of recurrent major depressive disorder (H)    -  1    NICHOLE (generalized anxiety disorder)        Mood swings (H)        Screening for STDs (sexually transmitted diseases)          Care Instructions      Preventive Health Recommendations  Female Ages 18 to 25     Yearly exam:     See your health care provider every year in order to  o Review health changes.   o Discuss preventive care.    o Review your medicines if your doctor has prescribed any.      You should be tested each year for STDs (sexually transmitted diseases).       After age 20, talk to your provider about how often you should have cholesterol testing.      Starting at age 21, get a Pap test every three years. If you have an abnormal result, your doctor may have you test more often.      If you are at risk for diabetes, you should have a diabetes test (fasting glucose).     Shots:     Get a flu shot each year.     Get a tetanus shot every 10 years.     Consider getting the shot (vaccine) that prevents cervical cancer (Gardasil).    Nutrition:     Eat at least 5 servings of fruits and vegetables each day.    Eat whole-grain bread, whole-wheat pasta and brown rice instead of white grains and rice.    Talk to your provider about Calcium and Vitamin D.     Lifestyle    Exercise at least 150 minutes a week each week (30 minutes a day, 5 days a week). This will help you control your weight and prevent disease.    Limit alcohol to one drink per day.    No smoking.     Wear sunscreen to prevent skin cancer.    See your dentist every six months for an exam and cleaning.        Follow-ups after your visit        Your next 10 appointments already  "scheduled     May 26, 2017  9:00 AM CDT   (Arrive by 8:45 AM)   Return Visit with Sandee Waller [facility] ([facility] [place] [facility]    [address]  [place] [address] [phone]            2017 10:30 AM CDT   (Arrive by 10:15 AM)   New Visit with DAVID Hammer   [facility] [place] [facility] ([facility])    [address] [place], [place] [address] [place] [address] [phone]              Who to contact     If you have questions or need follow up information about today's clinic visit or your schedule please contact [facility] directly at [phone].  Normal or non-critical lab and imaging results will be communicated to you by MyChart, letter or phone within 4 business days after the clinic has received the results. If you do not hear from us within 7 days, please contact the clinic through MyChart or phone. If you have a critical or abnormal lab result, we will notify you by phone as soon as possible.  Submit refill requests through ThemBid or call your pharmacy and they will forward the refill request to us. Please allow 3 business days for your refill to be completed.          Additional Information About Your Visit        MyCharTxt4 Information     ThemBid lets you send messages to your doctor, view your test results, renew your prescriptions, schedule appointments and more. To sign up, go to [email or web address] . Click on \"Log in\" on the left side of the screen, which will take you to the Welcome page. Then click on \"Sign up Now\" on the right side of the page.     You will be asked to enter the access code listed below, as well as some personal information. Please follow the directions to create your username and password.     Your access code is: [email or web address]  Expires: 2017  2:38 PM     Your access code will  in 90 days. If you need help or a new code, please call your [facility] or [phone].        Care EveryWhere ID     This is " "your Care EveryWhere ID. This could be used by other organizations to access your [place] medical records  [id number]        Your Vitals Were     Pulse Temperature Respirations Height Pulse Oximetry BMI (Body Mass Index)    89 98.2  F (36.8  C) 17 5' 1.25\" (1.556 m) 100% 29.05 kg/m2       Blood Pressure from Last 3 Encounters:   05/23/17 118/70   04/12/17 118/62   03/23/17 119/81    Weight from Last 3 Encounters:   05/23/17 155 lb (70.3 kg)   04/12/17 152 lb (68.9 kg)   03/23/17 153 lb (69.4 kg)              We Performed the Following     Anti Treponema     CBC with platelets     Chlamydia trachomatis PCR     Hepatitis B surface antigen     HIV Antigen Antibody Combo     Neisseria gonorrhoeae PCR     TSH with free T4 reflex     Wet prep          Today's Medication Changes          These changes are accurate as of: 5/23/17  3:09 PM.  If you have any questions, ask your nurse or doctor.               These medicines have changed or have updated prescriptions.        Dose/Directions    hydrOXYzine 25 MG capsule   Commonly known as:  VISTARIL   This may have changed:  when to take this   Used for:  Mood swings (H), NICHOLE (generalized anxiety disorder)   Changed by:  Nivia Jimenes MD        Dose:  25 mg   Take 1 capsule (25 mg) by mouth 4 times daily as needed for anxiety   Quantity:  60 capsule   Refills:  1       sertraline 100 MG tablet   Commonly known as:  ZOLOFT   This may have changed:    - medication strength  - how much to take   Used for:  Moderate episode of recurrent major depressive disorder (H), NICHOLE (generalized anxiety disorder)   Changed by:  Nivia Jimenes MD        Dose:  100 mg   Take 1 tablet (100 mg) by mouth daily   Quantity:  30 tablet   Refills:  1            Where to get your medicines      Some of these will need a paper prescription and others can be bought over the counter.  Ask your nurse if you have questions.     Bring a paper prescription for each of these medications     " hydrOXYzine 25 MG capsule    sertraline 100 MG tablet                Primary Care Provider Office Phone # Fax #    Nivia Jimenes [phone]       [facility] [place] [address] [place] [address]        Thank you!     [facility] you [facility]  [facility] your [facility]. Our goal is always to provide you with excellent care. Hearing back from our patients is one way we can continue to improve our services. Please take a few minutes to complete the written survey that you may receive in the mail after your visit with us. Thank you!             Your Updated Medication List - Protect others around you: Learn how to safely use, store and throw away your medicines at www.disposemymeds.org.          This list is accurate as of: 5/23/17  3:09 PM.  Always use your most recent med list.                   Brand Name Dispense Instructions for use    etonogestrel 68 MG Impl    IMPLANON/NEXPLANON     1 each by Subdermal route once       hydrOXYzine 25 MG capsule    VISTARIL    60 capsule    Take 1 capsule (25 mg) by mouth 4 times daily as needed for anxiety       prenatal multivitamin  plus iron 27-0.8 MG Tabs per tablet     100 tablet    Take 1 tablet by mouth daily       ranitidine 300 MG tablet    ZANTAC    30 tablet    Take 1 tablet (300 mg) by mouth At Bedtime       sertraline 100 MG tablet    ZOLOFT    30 tablet    Take 1 tablet (100 mg) by mouth daily

## 2017-05-23 NOTE — LETTER
[facility] [place]  [address]  [place] [address]  [phone]      May 25, 2017      Myrna Martinez  [address] [address]        Dear Myrna,    We were unable to reach you by phone.  One of the swabs was positive for bacterial vaginosis, an overgrowth of normal bacteria.  A prescription was sent to your pharmacy, do not drink alcohol while on this medication.    If you have any questions please call my office [phone].      Sincerely,    Dr. Nivia Jimenes/

## 2017-05-24 LAB
HBV SURFACE AG SERPL QL IA: NONREACTIVE
HIV 1+2 AB+HIV1 P24 AG SERPL QL IA: NORMAL

## 2017-05-24 ASSESSMENT — ANXIETY QUESTIONNAIRES: GAD7 TOTAL SCORE: 14

## 2017-05-24 ASSESSMENT — PATIENT HEALTH QUESTIONNAIRE - PHQ9: SUM OF ALL RESPONSES TO PHQ QUESTIONS 1-9: 13

## 2017-05-25 LAB
C TRACH DNA SPEC QL NAA+PROBE: NORMAL
N GONORRHOEA DNA SPEC QL NAA+PROBE: NORMAL
SPECIMEN SOURCE: NORMAL
SPECIMEN SOURCE: NORMAL
T PALLIDUM IGG+IGM SER QL: NEGATIVE

## 2017-05-25 RX ORDER — METRONIDAZOLE 500 MG/1
500 TABLET ORAL 2 TIMES DAILY
Qty: 14 TABLET | Refills: 0 | Status: SHIPPED | OUTPATIENT
Start: 2017-05-25 | End: 2017-06-21

## 2017-05-30 LAB
COPATH REPORT: NORMAL
PAP: NORMAL

## 2017-05-31 LAB
FINAL DIAGNOSIS: NORMAL
HPV HR 12 DNA CVX QL NAA+PROBE: NEGATIVE
HPV16 DNA SPEC QL NAA+PROBE: NEGATIVE
HPV18 DNA SPEC QL NAA+PROBE: NEGATIVE
SPECIMEN DESCRIPTION: NORMAL

## 2017-06-08 ENCOUNTER — OFFICE VISIT (OUTPATIENT)
Dept: BEHAVIORAL HEALTH | Facility: OTHER | Age: 27
End: 2017-06-08
Attending: SOCIAL WORKER
Payer: MEDICAID

## 2017-06-08 DIAGNOSIS — F33.1 MAJOR DEPRESSIVE DISORDER, RECURRENT EPISODE, MODERATE (H): Primary | ICD-10-CM

## 2017-06-08 DIAGNOSIS — F41.1 GAD (GENERALIZED ANXIETY DISORDER): ICD-10-CM

## 2017-06-08 PROCEDURE — 90837 PSYTX W PT 60 MINUTES: CPT | Performed by: SOCIAL WORKER

## 2017-06-08 NOTE — LETTER
[facility] [place]  [address] [id number] [phone]      Myrna,    Here's a list of the other [facility]'s in the [facility] in the [facility].  Thanks.    [facility] [address] [place] [address] [place] [phone]    [facility] [address] [place] [address] [place] [phone]    [facility] [address]  [place] [address] [place] [phone]    [facility] [address] [place] [address] [place] [phone]    The [facility] is the closest to [place].

## 2017-06-08 NOTE — PROGRESS NOTES
[facility]  June 8, 2017    Behavioral Health Clinician Progress Note    Patient Name: Myrna Martinez           Service Type: Individual      Service Location:   Face to Face in Clinic     Session Start Time: 9:10  Session End Time: 10:20      Session Length: 53 - 60      Attendees: Client    Visit Activities (Refresh list every visit): [facility] Only    Diagnostic Assessment Date: 11/17/16  Treatment Plan Review Date: 4/12/17 - 7/12/17  See Flowsheets for today's PHQ-9 and NICHOLE-7 results  Previous PHQ-9:   PHQ-9 SCORE 11/9/2016 4/12/2017 5/23/2017   Total Score 6 17 13     Previous NICHOLE-7:   NICHOLE-7 SCORE 11/9/2016 4/12/2017 5/23/2017   Total Score 9 18 14       RAMO LEVEL:  RAMO Score (Last Two) 11/10/2016   RAMO Raw Score 27   Activation Score 47.4   RAMO Level 2     DATA  Extended Session (60+ minutes): No  Interactive Complexity: No  Crisis: No    Treatment Objective(s) Addressed in This Session:  Target Behavior(s): symptom management associated with mental health/ anxiety.    Anxiety: will experience a reduction in anxiety, will develop more effective coping skills to manage anxiety symptoms, will develop healthy cognitive patterns and beliefs and will increase ability to function adaptively    Current Stressors / Issues:  Patient reports continued stress and anxiety.  Ex- boyfriend is causing issues in going through custody battles over care taking of their Son.  Patient reports it needing to go to court.  Patient also reporting plan to move to the [facility] with her mom, as the only reason they moved to [place] was this relationship that has now ended.  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    Patient reported that she would like to use alpha-stim during therapy session today.  Patient gave verbal consent to use Alpha- Stim  as a tx modality.  Discussed any false beliefs, myths, concerns about the  use.  Facilitated a brief induction with patient.  Reviewed potential for dizzy or nausea feeling.   Patient expressed understanding and in agreement with continued use.  Patient did have this response, but was able to tolerate treatment with no complications. Patient completed a 30 minute treatment session at 250-350 microampere (?A) current.  States she liked it best around 300 microampere (?A) best.  Patient reports feeling less anxious at culmination.  Also reports that she liked using it.    Reviewed  coping skills.  Is trying to journal, some days is better at this than others.  Encouraged to use skills we've talked about in therapy.    Progress on Treatment Objective(s) / Homework:  Minimal progress - ACTION (Actively working towards change); Intervened by reinforcing change plan / affirming steps taken    Motivational Interviewing    MI Intervention: Expressed Empathy/Understanding, Supported Autonomy, Collaboration, Evocation, Permission to raise concern or advise, Open-ended questions, Reflections: simple and complex, Change talk (evoked) and Reframe     Change Talk Expressed by the Patient: Desire to change Ability to change Reasons to change Need to change    Provider Response to Change Talk: E - Evoked more info from patient about behavior change, A - Affirmed patient's thoughts, decisions, or attempts at behavior change, R - Reflected patient's change talk and S - Summarized patient's change talk statements    Also provided psychoeducation about behavioral health condition, symptoms, and treatment options    Care Plan review completed: No    Medication Review:  No changes to current psychiatric medication(s)    Medication Compliance:  Yes    Changes in Health Issues:   Yes: ENT/ sinus issues- states unable to breathe through nose.  Can not smell.- Chronic    Chemical Use Review:   Substance Use: Chemical use reviewed, no active concerns identified   Reports off and on marijuana  use, has now stopped  all use of it.     Tobacco Use: No current tobacco use.      Assessment: Current Emotional / Mental Status (status of significant symptoms):  Risk status (Self / Other harm or suicidal ideation)  Patient has had a history of suicidal ideation: 1x previously about 2 years ago. and suicide attempts: x1  Patient denies current fears or concerns for personal safety.  Patient denies current or recent suicidal ideation or behaviors.  Patient denies current or recent homicidal ideation or behaviors.  Patient denies current or recent self injurious behavior or ideation.  Patient denies other safety concerns.  Patient reports there are firearms in the house. The firearms are secured in a locked space  A safety and risk management plan has not been developed at this time, however client was given the after-hours number / 911 should there be a change in any of these risk factors.    Appearance:   Appropriate   Eye Contact:   Fair   Psychomotor Behavior: Normal   Attitude:   Cooperative   Orientation:   All  Speech   Rate / Production: Normal    Volume:  Normal   Mood:    Normal  Affect:    Appropriate   Thought Content:  Clear   Thought Form:  Coherent  Logical   Insight:    Good     Diagnoses:  1. Major depressive disorder, recurrent episode, moderate (H)    2. NICHOLE (generalized anxiety disorder)      Collateral Reports Completed:  Not Applicable    Plan: (Homework, other):  Patient was given information about behavioral services and encouraged to schedule a follow up appointment with the clinic [facility] in 1 week.   She was also given Cognitive Behavioral Therapy skills to practice when experiencing anxiety.  CD Recommendations: Maintain Sobriety.  Sandee Waller, [place], [facility]   ______________________________________________________________________     Integrated Primary Care Behavioral Health Treatment Plan     Patient's Name: Myrna Martinez                                   YOB: 1990     Date: April 12,  2017     DSM-V Diagnoses: 311 Other/unspec. Depressive Disorder  300.02 (F41.1) Generalized Anxiety Disorder  Psychosocial / Contextual Factors: health issues, limited social support, mental health symptoms, occupational / vocational stress and relationship stress  WHODAS: 23     Referral / Collaboration:  The following referral(s) will be initiated: Issa Hess for family counseling.     Anticipated number of session or this episode of care: 24     MeasurableTreatment Goal(s) related to diagnosis / functional impairment(s)  Goal 1: Patient will focus on improving herself and decreasing her anxiety.    I will know I've met my goal when I feel happy and my anxiety is less.       Objective #A (Patient Action)                                              Patient will use cognitive strategies identified in therapy to challenge anxious thoughts  practice coping skills learned in therapy to help reduce anxiety.  Status: New - Date: 4/12/17      Intervention(s)  [facility] will provide appropriate theraputic relationship in which patient can release emotions and stressors in a safe place.  teach CBT skills.     Patient has reviewed and agreed to the above plan.        Sanede Waller, [facility]                                       April 12, 2017

## 2017-06-08 NOTE — MR AVS SNAPSHOT
After Visit Summary   6/8/2017    Myrna Martinez    MRN: [id number]           Patient Information     Date Of Birth          1990        Visit Information        Provider Department      6/8/2017 9:00 AM Sandee Waller [facility]        Today's Diagnoses     Major depressive disorder, recurrent episode, moderate (H)    -  1    NICHOLE (generalized anxiety disorder)           Follow-ups after your visit        Your next 10 appointments already scheduled     Jun 12, 2017  9:30 AM CDT   (Arrive by 9:15 AM)   Return Visit with Sandee Waller [facility]    [address] [place] [address] [phone]            Jun 12, 2017  9:30 AM CDT   (Arrive by 9:15 AM)   Return Visit with Sejal Hyman [facility] ([facility])    [address] [place] [address] [phone]              Who to contact     If you have questions or need follow up information about today's clinic visit or your schedule please contact [facility] directly at [phone].  Normal or non-critical lab and imaging results will be communicated to you by MyChart, letter or phone within 4 business days after the clinic has received the results. If you do not hear from us within 7 days, please contact the clinic through Bringrrhart or phone. If you have a critical or abnormal lab result, we will notify you by phone as soon as possible.  Submit refill requests through [employer] or call your pharmacy and they will forward the refill request to us. Please allow 3 business days for your refill to be completed.          Additional Information About Your Visit        MyChart Information     [employer] gives you secure access to your electronic health record. If you see a primary care provider, you can also send messages to your care team and make appointments. If you have questions, please call your primary care  clinic.  If you do not have a primary care provider, please call [phone] and they will assist you.        Care EveryWhere ID     This is your Care EveryWhere ID. This could be used by other organizations to access your [place] medical records  [id number]         Blood Pressure from Last 3 Encounters:   05/23/17 118/70   04/12/17 118/62   03/23/17 119/81    Weight from Last 3 Encounters:   05/23/17 155 lb (70.3 kg)   04/12/17 152 lb (68.9 kg)   03/23/17 153 lb (69.4 kg)              Today, you had the following     No orders found for display       Primary Care Provider Office Phone # Fax #    Nivia Jimenes [phone]       [facility] [place] [address]  [place] [address]        Thank you!     Thank you for choosing [facility] [place]  for your care. Our goal is always to provide you with excellent care. Hearing back from our patients is one way we can continue to improve our services. Please take a few minutes to complete the written survey that you may receive in the mail after your visit with us. Thank you!             Your Updated Medication List - Protect others around you: Learn how to safely use, store and throw away your medicines at www.disposemymeds.org.          This list is accurate as of: 6/8/17  1:11 PM.  Always use your most recent med list.                   Brand Name Dispense Instructions for use    etonogestrel 68 MG Impl    IMPLANON/NEXPLANON     1 each by Subdermal route once       hydrOXYzine 25 MG capsule    VISTARIL    60 capsule    Take 1 capsule (25 mg) by mouth 4 times daily as needed for anxiety       metroNIDAZOLE 500 MG tablet    FLAGYL    14 tablet    Take 1 tablet (500 mg) by mouth 2 times daily       prenatal multivitamin  plus iron 27-0.8 MG Tabs per tablet     100 tablet    Take 1 tablet by mouth daily       ranitidine 300 MG tablet    ZANTAC    30 tablet    Take 1 tablet (300 mg) by mouth At Bedtime       sertraline 100 MG tablet     ZOLOFT    30 tablet    Take 1 tablet (100 mg) by mouth daily

## 2017-06-20 ENCOUNTER — TELEPHONE (OUTPATIENT)
Dept: BEHAVIORAL HEALTH | Facility: OTHER | Age: 27
End: 2017-06-20

## 2017-06-20 NOTE — TELEPHONE ENCOUNTER
"[facility]  @FLOW([id number])@      Social Work Care Navigator Note      Patient: Myrna Martinez  Date: June 20, 2017  Preferred Name: Myrna    Previous PHQ-9:   PHQ-9 SCORE 11/9/2016 4/12/2017 5/23/2017   Total Score 6 17 13     Previous NICHOLE-7:   NICHOLE-7 SCORE 11/9/2016 4/12/2017 5/23/2017   Total Score 9 18 14     RAMO LEVEL:  RAMO Score (Last Two) 11/10/2016   RAMO Raw Score 27   Activation Score 47.4   RAMO Level 2       Preferred Contact: @MARION([id number])@  Type of Contact Today: Phone call (patient / identified key support person reached)      Data: (subjective / Objective):  Patient Goals Areas: @FLOW([id number])@  Patient Stated Goals: @FLOW([id number])@  Recent ED/IP Admission or Discharge?   None  Recent ED/IP Admission or Discharge?   None    Patient Goals:  Goal Areas: Mental Health  Patient stated goals: I will learn more about myself through thinking about my priorities, goals and values for my life.  I will do this by using the big picture application DBT hand out.      [facility] Core Service Provided:  Comprehensive Care Management: utilized the electronic medical record / patient registry to identify and support patient's health conditions / needs more effectively     Current Stressors / Issues / Care Plan Objective Addressed Today:    Wili Friday was not helpful according to Myrna. They were in [place] yesterday , and now heading down to [place] to deliver papers to son's father  about custody. Myrna is moving to the [facility]- they did get an Establish Care appt at a [facility] in the [facility]-but can't remember the name.   Things are moving forward-but \"very slowly\"  Had a fight with mother yesterday- she did use coping skills to get through it.    Intervention:  Motivational Interviewing: Expressed Empathy/Understanding and Supported Autonomy, Collaboration, Evocation   Target Behavior(s): Explored and resolved challenges to attending appointments as " scheduled    Assessment: (Progress on Goals / Homework):  Progressing-managing to keep appts, and get places on time even during times of stress.    Plan: (Homework, other):  Patient was encouraged to continue to seek condition-related information and education.      Scheduled a Phone follow up appointment with INA RIGGINS in 3 weeks     Patient has set self-identified goals and will monitor progress until the next appointment on: 7/10/17.     Sejal Hyman, Social Work Care Coordinator               Next 5 appointments (look out 90 days)     Jun 21, 2017  1:20 PM CDT   Office Visit with Haroon Cerna MD   [facility]    [address] [address] [phone]

## 2017-06-21 ENCOUNTER — OFFICE VISIT (OUTPATIENT)
Dept: FAMILY MEDICINE | Facility: CLINIC | Age: 27
End: 2017-06-21
Payer: MEDICAID

## 2017-06-21 VITALS
SYSTOLIC BLOOD PRESSURE: 102 MMHG | BODY MASS INDEX: 28.34 KG/M2 | HEIGHT: 62 IN | HEART RATE: 80 BPM | WEIGHT: 154 LBS | DIASTOLIC BLOOD PRESSURE: 60 MMHG | TEMPERATURE: 98.1 F

## 2017-06-21 DIAGNOSIS — K21.9 GASTROESOPHAGEAL REFLUX DISEASE WITHOUT ESOPHAGITIS: ICD-10-CM

## 2017-06-21 DIAGNOSIS — F33.1 MODERATE EPISODE OF RECURRENT MAJOR DEPRESSIVE DISORDER (H): ICD-10-CM

## 2017-06-21 DIAGNOSIS — F41.1 GAD (GENERALIZED ANXIETY DISORDER): ICD-10-CM

## 2017-06-21 PROCEDURE — 99213 OFFICE O/P EST LOW 20 MIN: CPT | Performed by: FAMILY MEDICINE

## 2017-06-21 RX ORDER — SERTRALINE HYDROCHLORIDE 100 MG/1
100 TABLET, FILM COATED ORAL DAILY
Qty: 90 TABLET | Refills: 1 | Status: SHIPPED | OUTPATIENT
Start: 2017-06-21 | End: 2018-05-10

## 2017-06-21 ASSESSMENT — ANXIETY QUESTIONNAIRES
2. NOT BEING ABLE TO STOP OR CONTROL WORRYING: SEVERAL DAYS
GAD7 TOTAL SCORE: 5
6. BECOMING EASILY ANNOYED OR IRRITABLE: SEVERAL DAYS
7. FEELING AFRAID AS IF SOMETHING AWFUL MIGHT HAPPEN: NOT AT ALL
3. WORRYING TOO MUCH ABOUT DIFFERENT THINGS: SEVERAL DAYS
IF YOU CHECKED OFF ANY PROBLEMS ON THIS QUESTIONNAIRE, HOW DIFFICULT HAVE THESE PROBLEMS MADE IT FOR YOU TO DO YOUR WORK, TAKE CARE OF THINGS AT HOME, OR GET ALONG WITH OTHER PEOPLE: NOT DIFFICULT AT ALL
1. FEELING NERVOUS, ANXIOUS, OR ON EDGE: SEVERAL DAYS
5. BEING SO RESTLESS THAT IT IS HARD TO SIT STILL: NOT AT ALL

## 2017-06-21 ASSESSMENT — PATIENT HEALTH QUESTIONNAIRE - PHQ9: 5. POOR APPETITE OR OVEREATING: SEVERAL DAYS

## 2017-06-21 NOTE — PATIENT INSTRUCTIONS
[facility]   Discharged by : sandra   Paper scripts provided to patient : none     If you have any questions regarding your visit please contact your care team:     [facility] Clinic Hours Telephone Number   [address] Dr., Dr.   7am-7pm Monday - Thursday   7am-5pm Fridays  [phone]   (Appointment scheduling available 24/7)   RN Line   [phone] option 2       For a Price Quote for your services, please call our Consumer Price Line at [phone].     What options do I have for visits at the clinic other than the traditional office visit?     To expand how we care for you, many of our providers are utilizing electronic visits (e-visits) and telephone visits, when medically appropriate, for interactions with their patients rather than a visit in the clinic. We also offer nurse visits for many medical concerns. Just like any other service, we will bill your insurance company for this type of visit based on time spent on the phone with your provider. Not all insurance companies cover these visits. Please check with your medical insurance if this type of visit is covered. You will be responsible for any charges that are not paid by your insurance.   E-visits via marshallindex: generally incur a $35.00 fee.     Telephone visits:   Time spent on the phone: *charged based on time that is spent on the phone in increments of 10 minutes. Estimated cost:   5-10 mins $30.00   11-20 mins. $59.00   21-30 mins. $85.00     Use ExaGrid Systemst (secure email communication and access to your chart) to send your primary care provider a message or make an appointment. Ask someone on your Team how to sign up for ExaGrid Systemst.     As always, Thank you for trusting us with your health care needs!      [facility]:    Scheduling Appointments  [facility]  Call: [phone]    [facility]  [facility]  Call: [phone]    [facility]  Call: [phone]      WHERE TO GO FOR CARE?    Clinic    Make an appointment if you:       Are sick (cold, cough, flu, sore throat, earache or in pain).       Have a small injury (sprain, small cut, burn or broken bone).       Need a physical exam, Pap smear, vaccine or prescription refill.       Have questions about your health or medicines.    To reach us:      Call [phone] ([phone]). Open 24 hours every day. (For counseling services, call [phone].)    Log into InstraGrok at BrandMe crowdmarketing. (Visit [employer] to create an account.) Hospital emergency room    An emergency is a serious or life- threatening problem that must be treated right away.    Call [phone] or get to the hospital if you have:      Very bad or sudden:            - Chest pain or pressure         - Bleeding         - Head or belly pain         - Dizziness or trouble seeing, walking or                          Speaking      Problems breathing      Blood in your vomit or you are coughing up blood      A major injury (knocked out, loss of a finger or limb, rape, broken bone protruding from skin)    A mental health crisis. (Or call the Mental Health Crisis line at 1-305.406.1138 or Suicide Prevention Hotline at 1-799.785.6786.)    Open 24 hours every day. You don't need an appointment.     Urgent care    Visit urgent care for sickness or small injuries when the clinic is closed. You don't need an appointment. To check hours or find an urgent care near you, visit [email or web address]. Online care    Get online care from [facility] for more than 70 common problems, like colds, allergies and infections. Open 24 hours every day at:   [email or web address]   Need help deciding?    For advice about where to be seen, you may call your clinic and ask to speak with a nurse. We're here for you 24 hours every day.         If you are deaf or hard of hearing, please let us  know. We provide many free services including sign language interpreters, oral interpreters, TTYs, telephone amplifiers, note takers and written materials.

## 2017-06-21 NOTE — MR AVS SNAPSHOT
After Visit Summary   6/21/2017    Myrna Martinez    MRN: [id number]           Patient Information     Date Of Birth          1990        Visit Information        Provider Department      6/21/2017 1:20 PM Haroon Cerna MD [facility]        Today's Diagnoses     Moderate episode of recurrent major depressive disorder (H)        NICHOLE (generalized anxiety disorder)        Gastroesophageal reflux disease without esophagitis          Care Instructions    [facility]   Discharged by : sandra   Paper scripts provided to patient : none     If you have any questions regarding your visit please contact your care team:     [facility] Clinic Hours Telephone Number   Dr. Pushpa Gu, PACarrieC  Dr. Haroon Cerna   7am-7pm Monday - Thursday   7am-5pm Fridays  [phone]   (Appointment scheduling available 24/7)   RN Line   [phone] option 2       For a Price Quote for your services, please call our ulike Price Line at [phone].     What options do I have for visits at the clinic other than the traditional office visit?     To expand how we care for you, many of our providers are utilizing electronic visits (e-visits) and telephone visits, when medically appropriate, for interactions with their patients rather than a visit in the clinic. We also offer nurse visits for many medical concerns. Just like any other service, we will bill your insurance company for this type of visit based on time spent on the phone with your provider. Not all insurance companies cover these visits. Please check with your medical insurance if this type of visit is covered. You will be responsible for any charges that are not paid by your insurance.   E-visits via Storage Genetics: generally incur a $35.00 fee.     Telephone visits:   Time spent on the phone: *charged based on time that is spent on the phone in  increments of 10 minutes. Estimated cost:   5-10 mins $30.00   11-20 mins. $59.00   21-30 mins. $85.00     Use Locality (secure email communication and access to your chart) to send your primary care provider a message or make an appointment. Ask someone on your Team how to sign up for Locality.     As always, Thank you for trusting us with your health care needs!      [facility]:    Scheduling Appointments  [facility]  Call: [phone]    [facility]  Call: [phone]    [facility]  Call: [phone]      WHERE TO GO FOR CARE?    Clinic    Make an appointment if you:       Are sick (cold, cough, flu, sore throat, earache or in pain).       Have a small injury (sprain, small cut, burn or broken bone).       Need a physical exam, Pap smear, vaccine or prescription refill.       Have questions about your health or medicines.    To reach us:      Call [phone] ([phone]). Open 24 hours every day. (For counseling services, call [phone].)    Log into Locality at [email or web address]. (Visit [email or web address] to create an account.) Hospital emergency room    An emergency is a serious or life- threatening problem that must be treated right away.    Call [phone] or get to the hospital if you have:      Very bad or sudden:            - Chest pain or pressure         - Bleeding         - Head or belly pain         - Dizziness or trouble seeing, walking or                          Speaking      Problems breathing      Blood in your vomit or you are coughing up blood      A major injury (knocked out, loss of a finger or limb, rape, broken bone protruding from skin)    A mental health crisis. (Or call the Mental Health Crisis line at 1-499.345.9165 or Suicide Prevention Hotline at 1-922.371.9154.)    Open 24 hours every day. You don't need an appointment.     Urgent care    Visit urgent care for sickness or small  injuries when the clinic is closed. You don't need an appointment. To check hours or find an urgent care near you, visit [email or web address]. Online care    Get online care from [facility] for more than 70 common problems, like colds, allergies and infections. Open 24 hours every day at:   [email or web address]   Need help deciding?    For advice about where to be seen, you may call your clinic and ask to speak with a nurse. We're here for you 24 hours every day.         If you are deaf or hard of hearing, please let us know. We provide many free services including sign language interpreters, oral interpreters, TTYs, telephone amplifiers, note takers and written materials.                         Follow-ups after your visit        Who to contact     If you have questions or need follow up information about today's clinic visit or your schedule please contact [facility] directly at [phone].  Normal or non-critical lab and imaging results will be communicated to you by MyChart, letter or phone within 4 business days after the clinic has received the results. If you do not hear from us within 7 days, please contact the clinic through Minerva Worldwidehart or phone. If you have a critical or abnormal lab result, we will notify you by phone as soon as possible.  Submit refill requests through Full Circle CRM or call your pharmacy and they will forward the refill request to us. Please allow 3 business days for your refill to be completed.          Additional Information About Your Visit        Min[employer]a Worldwidehart Information     Full Circle CRM gives you secure access to your electronic health record. If you see a primary care provider, you can also send messages to your care team and make appointments. If you have questions, please call your primary care clinic.  If you do not have a primary care provider, please call [phone] and they will assist you.        Care EveryWhere ID     This is your Care EveryWhere ID. This could be used by other  "organizations to access your [facility] medical records  [id number]        Your Vitals Were     Pulse Temperature Height Breastfeeding? BMI (Body Mass Index)       80 98.1  F (36.7  C) (Oral) 5' 1.5\" (1.562 m) No 28.63 kg/m2        Blood Pressure from Last 3 Encounters:   06/21/17 102/60   05/23/17 118/70   04/12/17 118/62    Weight from Last 3 Encounters:   06/21/17 154 lb (69.9 kg)   05/23/17 155 lb (70.3 kg)   04/12/17 152 lb (68.9 kg)              Today, you had the following     No orders found for display         Where to get your medicines      These medications were sent to [facility] [id number] [facility] [address] AT [facility] [address]  [address], [facility] [address]    Hours:  24-hours Phone:  [phone]     ranitidine 300 MG tablet    sertraline 100 MG tablet          Primary Care Provider Office Phone # Fax #    Nivia Jimenes [phone]       [facility] [place] [address]  [place] [address]        Equal Access to Services     CAROL PUGA AH: Hadii aad ku hadasho Soomaali, waaxda luqadaha, qaybta kaalmada adeegyada, waxay idiin hayaan donovan vargas laaldair ah. So [facility] [phone].    ATENCIÓN: Si habla español, tiene a ojeda disposición servicios gratuitos de asistencia lingüística. Llame al [phone].    We comply with applicable federal civil rights laws and [place] laws. We do not discriminate on the basis of race, color, national origin, age, disability sex, sexual orientation or gender identity.            Thank you!     Thank you for choosing [facility]  for your care. Our goal is always to provide you with excellent care. Hearing back from our patients is one way we can continue to improve our services. Please take a few minutes to complete the written survey that you may receive in the mail after your visit with us. Thank you!             Your Updated Medication List - Protect others around you: " Learn how to safely use, store and throw away your medicines at www.disposemymeds.org.          This list is accurate as of: 6/21/17  1:59 PM.  Always use your most recent med list.                   Brand Name Dispense Instructions for use Diagnosis    etonogestrel 68 MG Impl    IMPLANON/NEXPLANON     1 each by Subdermal route once        hydrOXYzine 25 MG capsule    VISTARIL    60 capsule    Take 1 capsule (25 mg) by mouth 4 times daily as needed for anxiety    Mood swings (H), NICHOLE (generalized anxiety disorder)       prenatal multivitamin  plus iron 27-0.8 MG Tabs per tablet     100 tablet    Take 1 tablet by mouth daily    NICHOLE (generalized anxiety disorder), Mood swings (H)       ranitidine 300 MG tablet    ZANTAC    90 tablet    Take 1 tablet (300 mg) by mouth At Bedtime    Gastroesophageal reflux disease without esophagitis       sertraline 100 MG tablet    ZOLOFT    90 tablet    Take 1 tablet (100 mg) by mouth daily    Moderate episode of recurrent major depressive disorder (H), NICHOLE (generalized anxiety disorder)

## 2017-06-21 NOTE — NURSING NOTE
"Chief Complaint   Patient presents with     Depression     Anxiety     would like to discuss different therpy due to side effect        Initial /60  Pulse 80  Temp 98.1  F (36.7  C) (Oral)  Ht 5' 1.5\" (1.562 m)  Wt 154 lb (69.9 kg)  Breastfeeding? No  BMI 28.63 kg/m2 Estimated body mass index is 28.63 kg/(m^2) as calculated from the following:    Height as of this encounter: 5' 1.5\" (1.562 m).    Weight as of this encounter: 154 lb (69.9 kg).  Medication Reconciliation: complete   Haylee Mcfadden CMA (AAMA)      "

## 2017-06-21 NOTE — PROGRESS NOTES
SUBJECTIVE:                                                    Myrna Martinez is a 27 year old female who presents to clinic today for the following health issues:        Depression and Anxiety Follow-Up    Status since last visit: Improved a little    Other associated symptoms:None    Complicating factors:     Significant life event: Yes-  Slit up with baby's father and feeling less stressed and better      Current substance abuse: None    PHQ-9 SCORE 4/12/2017 5/23/2017 6/21/2017   Total Score 17 13 3     NICHOLE-7 SCORE 4/12/2017 5/23/2017 6/21/2017   Total Score 18 14 5        PHQ-9  English      PHQ-9   Any Language     GAD7       Amount of exercise or physical activity: None    Problems taking medications regularly: No    Medication side effects: drowsy and falling asleep with Hydroxyzine, would like to discuss alternatives     Diet: regular (no restrictions)    Mom feels she is doing better at this time.  Still some concerns about legal issues and will be starting the court process for custody of her child.  Living with family and has a job at [employer] that is going well.  Vistaril makes her too sleepy.      Problem list and histories reviewed & adjusted, as indicated.  Additional history: as documented    Patient Active Problem List   Diagnosis     Major depressive disorder, recurrent episode, moderate (H)     Herpes genitalis in women     ACP (advance care planning)     NICHOLE (generalized anxiety disorder)     Past Surgical History:   Procedure Laterality Date     APPENDECTOMY       MAMMOPLASTY REDUCTION  2011       Social History   Substance Use Topics     Smoking status: Never Smoker     Smokeless tobacco: Never Used     Alcohol use 0.0 oz/week     0 Standard drinks or equivalent per week      Comment: 2x/month     Family History   Problem Relation Age of Onset     Depression Mother      Hypertension Mother      Migraines Mother      no longer     Depression Father      Bipolar Disorder Maternal Grandmother   "    Depression Maternal Grandmother      Hypertension Maternal Grandmother      Bipolar Disorder Paternal Grandmother      Migraines Brother          Current Outpatient Prescriptions   Medication Sig Dispense Refill     sertraline (ZOLOFT) 100 MG tablet Take 1 tablet (100 mg) by mouth daily 90 tablet 1     ranitidine (ZANTAC) 300 MG tablet Take 1 tablet (300 mg) by mouth At Bedtime 90 tablet 1     Prenatal Vit-Fe Fumarate-FA (PRENATAL MULTIVITAMIN  PLUS IRON) 27-0.8 MG TABS per tablet Take 1 tablet by mouth daily 100 tablet 3     etonogestrel (IMPLANON/NEXPLANON) 68 MG IMPL 1 each by Subdermal route once       hydrOXYzine (VISTARIL) 25 MG capsule Take 1 capsule (25 mg) by mouth 4 times daily as needed for anxiety (Patient not taking: Reported on 6/21/2017) 60 capsule 1     [DISCONTINUED] sertraline (ZOLOFT) 100 MG tablet Take 1 tablet (100 mg) by mouth daily 30 tablet 1     [DISCONTINUED] ValACYclovir HCl (VALTREX PO)        No Known Allergies    Reviewed and updated as needed this visit by clinical staff  Tobacco  Allergies  Meds  Problems  Med Hx  Surg Hx  Fam Hx  Soc Hx        Reviewed and updated as needed this visit by Provider  Tobacco  Allergies  Meds  Problems  Med Hx  Surg Hx  Fam Hx  Soc Hx          ROS:  C: NEGATIVE for fever, chills, change in weight  E/M: NEGATIVE for ear, mouth and throat problems  R: NEGATIVE for significant cough or SOB  CV: NEGATIVE for chest pain, palpitations or peripheral edema    OBJECTIVE:                                                    /60  Pulse 80  Temp 98.1  F (36.7  C) (Oral)  Ht 5' 1.5\" (1.562 m)  Wt 154 lb (69.9 kg)  Breastfeeding? No  BMI 28.63 kg/m2  Body mass index is 28.63 kg/(m^2).  GENERAL: healthy, alert and no distress  EYES: Eyes grossly normal to inspection, PERRL and conjunctivae and sclerae normal  RESP: lungs clear to auscultation - no rales, rhonchi or wheezes  CV: regular rate and rhythm, normal S1 S2, no S3 or S4, no murmur, " click or rub, no peripheral edema and peripheral pulses strong  NEURO: Normal strength and tone, mentation intact and speech normal  PSYCH: mentation appears normal, affect normal/bright    Diagnostic Test Results:  none      ASSESSMENT/PLAN:                                                    Depression; recurrent episode-- Moderate   Associated with the following complications:    None   Plan:  No changes in the patient's current treatment plan          1. Moderate episode of recurrent major depressive disorder (H)  Overall improved at this time.  Med refilled and suggested follow up in 6 months.  Seems she may not need a prn med at this time like she has been using the vistaril in the past.  - sertraline (ZOLOFT) 100 MG tablet; Take 1 tablet (100 mg) by mouth daily  Dispense: 90 tablet; Refill: 1    2. NICHOLE (generalized anxiety disorder)  Also improved.  No changes made.  - sertraline (ZOLOFT) 100 MG tablet; Take 1 tablet (100 mg) by mouth daily  Dispense: 90 tablet; Refill: 1    3. Gastroesophageal reflux disease without esophagitis  No alarm symptoms, refilled at this time.  - ranitidine (ZANTAC) 300 MG tablet; Take 1 tablet (300 mg) by mouth At Bedtime  Dispense: 90 tablet; Refill: 1    See Patient Instructions    Haroon Cerna MD  [facility]

## 2017-06-22 ASSESSMENT — ANXIETY QUESTIONNAIRES: GAD7 TOTAL SCORE: 5

## 2017-06-22 ASSESSMENT — PATIENT HEALTH QUESTIONNAIRE - PHQ9: SUM OF ALL RESPONSES TO PHQ QUESTIONS 1-9: 3

## 2017-07-10 ENCOUNTER — TELEPHONE (OUTPATIENT)
Dept: BEHAVIORAL HEALTH | Facility: OTHER | Age: 27
End: 2017-07-10

## 2017-07-10 NOTE — TELEPHONE ENCOUNTER
[facility]  @FLOW([id number])@      Social Work Care Navigator Note      Patient: Myrna Martinez  Date: July 10, 2017  Preferred Name: Myrna    Previous PHQ-9:   PHQ-9 SCORE 4/12/2017 5/23/2017 6/21/2017   Total Score 17 13 3     Previous NICHOLE-7:   NICHOLE-7 SCORE 4/12/2017 5/23/2017 6/21/2017   Total Score 18 14 5     RAMO LEVEL:  RAMO Score (Last Two) 11/10/2016   RAMO Raw Score 27   Activation Score 47.4   RAMO Level 2       Preferred Contact: @[facility]([id number])@  Type of Contact Today: Phone call (not reached/unavailable)      Data: (subjective / Objective):  Patient Goals Areas: @FLOW([id number])@  Patient Stated Goals: @FLOW([id number])@  Recent ED/IP Admission or Discharge?   None  Attempted to reach patient, but was unsuccessful.  Plan to attempt again.  Sejal Hyman

## 2017-07-21 ENCOUNTER — TELEPHONE (OUTPATIENT)
Dept: BEHAVIORAL HEALTH | Facility: OTHER | Age: 27
End: 2017-07-21

## 2017-07-21 NOTE — TELEPHONE ENCOUNTER
[facility]  @FLOW([id number])@      Social Work Care Navigator Note      Patient: Myrna Martinez  Date: July 21, 2017  Preferred Name: Myrna    Previous PHQ-9:   PHQ-9 SCORE 4/12/2017 5/23/2017 6/21/2017   Total Score 17 13 3     Previous NICHOLE-7:   NICHOLE-7 SCORE 4/12/2017 5/23/2017 6/21/2017   Total Score 18 14 5     RAMO LEVEL:  RAMO Score (Last Two) 11/10/2016   RAMO Raw Score 27   Activation Score 47.4   RAMO Level 2       Preferred Contact: @[facility]([id number])@  Type of Contact Today: Phone call (not reached/unavailable)      Data: (subjective / Objective):  Patient Goals Areas: @FLOW([id number])@  Patient Stated Goals: @FLOW([id number])@  Recent ED/IP Admission or Discharge?   None  Attempted to reach patient, but was unsuccessful.  Plan to attempt again.  Sejal Hyman

## 2017-09-20 ENCOUNTER — TELEPHONE (OUTPATIENT)
Dept: BEHAVIORAL HEALTH | Facility: OTHER | Age: 27
End: 2017-09-20

## 2017-09-20 NOTE — TELEPHONE ENCOUNTER
[facility]  @FLOW([id number])@      Social Work Care Navigator Note      Patient: Myrna Martinez  Date: September 20, 2017  Preferred Name: Myrna    Previous PHQ-9:   PHQ-9 SCORE 4/12/2017 5/23/2017 6/21/2017   Total Score 17 13 3     Previous NICHOLE-7:   NICHOLE-7 SCORE 4/12/2017 5/23/2017 6/21/2017   Total Score 18 14 5     RAMO LEVEL:  RAMO Score (Last Two) 11/10/2016   RAMO Raw Score 27   Activation Score 47.4   RAMO Level 2       Preferred Contact: @[facility]([id number])@  Type of Contact Today: Phone call (not reached/unavailable)      Data: (subjective / Objective):  Patient Goals Areas: @FLOW([id number])@  Patient Stated Goals: @FLOW([id number])@  Recent ED/IP Admission or Discharge?   None  Attempted to reach patient, but was unsuccessful.Attempted to reach patient, but was unsuccessful.  Patient has not has successful contact since June and will be disenrolled due to [facility] standards.   Luna Almodovar

## 2017-10-16 ENCOUNTER — TELEPHONE (OUTPATIENT)
Dept: FAMILY MEDICINE | Facility: OTHER | Age: 27
End: 2017-10-16

## 2017-11-02 ENCOUNTER — HOSPITAL ENCOUNTER (EMERGENCY)
Facility: HOSPITAL | Age: 27
Discharge: HOME | End: 2017-11-02
Attending: NURSE PRACTITIONER | Admitting: NURSE PRACTITIONER
Payer: COMMERCIAL

## 2017-11-02 VITALS
HEIGHT: 62 IN | SYSTOLIC BLOOD PRESSURE: 113 MMHG | OXYGEN SATURATION: 97 % | TEMPERATURE: 97.7 F | DIASTOLIC BLOOD PRESSURE: 67 MMHG | RESPIRATION RATE: 16 BRPM

## 2017-11-02 DIAGNOSIS — A60.00 RECURRENT GENITAL HERPES: ICD-10-CM

## 2017-11-02 PROCEDURE — 99213 OFFICE O/P EST LOW 20 MIN: CPT | Performed by: NURSE PRACTITIONER

## 2017-11-02 PROCEDURE — 99213 OFFICE O/P EST LOW 20 MIN: CPT

## 2017-11-02 RX ORDER — ACYCLOVIR 400 MG/1
400 TABLET ORAL 3 TIMES DAILY
Qty: 15 TABLET | Refills: 0 | Status: SHIPPED | OUTPATIENT
Start: 2017-11-02 | End: 2018-06-20

## 2017-11-02 RX ORDER — ACYCLOVIR 50 MG/G
CREAM TOPICAL
Qty: 5 G | Refills: 0 | Status: SHIPPED | OUTPATIENT
Start: 2017-11-02 | End: 2017-11-07

## 2017-11-02 ASSESSMENT — ENCOUNTER SYMPTOMS
DIARRHEA: 0
DIFFICULTY URINATING: 0
NAUSEA: 0
ACTIVITY CHANGE: 0
NUMBNESS: 0
DYSURIA: 0
APPETITE CHANGE: 0
VOMITING: 0
FLANK PAIN: 0
TROUBLE SWALLOWING: 0
ABDOMINAL PAIN: 0
CHILLS: 0
PSYCHIATRIC NEGATIVE: 1
FREQUENCY: 0
HEMATURIA: 0
FEVER: 0
COUGH: 0

## 2017-11-02 NOTE — ED AVS SNAPSHOT
[facility]    [address]    [place] [address]    Phone:  [phone]                                       Myrna Martinez   MRN: [id number]    Department:  [facility]   Date of Visit:  11/2/2017           Patient Information     Date Of Birth          1990        Your diagnoses for this visit were:     Recurrent genital herpes        You were seen by Maye Mckeon NP.      Follow-up Information     Follow up with Nivia Jimenes MD In 3 days.    Specialty:  Family Practice    Why:  For re-evaluation.     Contact information:    [facility] [place]  [address]  [place] [address] [phone]          Follow up with [facility].    Specialty:  EMERGENCY MEDICINE    Why:  As needed, If symptoms worsen    Contact information:    [address]  [place] [address] [phone]    Additional information:    From [place]: Take [address]. Turn left at [address]. Turn left at the first stoplight on [address]. At the first stop sign, take a right onto [address]. Take a left into the parking lot and continue through until you reach the North enterance of the building.       From [place]: Take [address]. Take the [address] ramp towards [place]. Turn left onto [address]. Take a slight right onto [address]. Turn left at the first stoplight on [address]. At the first stop sign, take a right onto [address]. Take a left into the parking lot and continue through until you reach the North enterance of the building.       From [place]: Take [address]. Take a right at [address]. At the first stop sign, take a right onto [address]. Take a left into the parking lot and continue through until you reach the North enterance of the building.         Discharge Instructions       Take Acyclovir as directed.   Use Acyclovir cream 5 times daily.    Follow up with PCP in 3 days.   Return to urgent care or emergency department with any increase in symptoms or concerns.     Discharge References/Attachments     HERPES GENITALIS, HSV: TYPE II (ENGLISH)    CARING FOR SORES, HERPES: (ENGLISH)         Review of your medicines      START taking        Dose / Directions Last dose taken    acyclovir 400 MG tablet   Commonly known as:  ZOVIRAX   Dose:  400 mg   Quantity:  15 tablet        Take 1 tablet (400 mg) by mouth 3 times daily for 5 days   Refills:  0        acyclovir 5 % cream   Commonly known as:  ZOVIRAX   Quantity:  5 g        Apply topically 5 times daily for 5 days   Refills:  0          Our records show that you are taking the medicines listed below. If these are incorrect, please call your family doctor or clinic.        Dose / Directions Last dose taken    ADVIL PO   Dose:  400 mg        Take 400 mg by mouth every 6 hours as needed for moderate pain   Refills:  0        etonogestrel 68 MG Impl   Commonly known as:  IMPLANON/NEXPLANON   Dose:  1 each        1 each by Subdermal route once   Refills:  0        hydrOXYzine 25 MG capsule   Commonly known as:  VISTARIL   Dose:  25 mg   Quantity:  60 capsule        Take 1 capsule (25 mg) by mouth 4 times daily as needed for anxiety   Refills:  1        prenatal multivitamin plus iron 27-0.8 MG Tabs per tablet   Dose:  1 tablet   Quantity:  100 tablet        Take 1 tablet by mouth daily   Refills:  3        ranitidine 300 MG tablet   Commonly known as:  ZANTAC   Dose:  300 mg   Quantity:  90 tablet        Take 1 tablet (300 mg) by mouth At Bedtime   Refills:  1        sertraline 100 MG tablet   Commonly known as:  ZOLOFT   Dose:  100 mg   Quantity:  90 tablet        Take 1 tablet (100 mg) by mouth daily   Refills:  1                Prescriptions were sent or printed at these locations (2 Prescriptions)                   [facility] [id number] - [place], [place] - [address] AT [facility] [address] [place] [address]    Telephone:  [phone]   Fax:  [phone]   Hours:                  E-Prescribed (2 of 2)         acyclovir (ZOVIRAX) 400 MG tablet               acyclovir (ZOVIRAX) 5 % cream                Orders Needing Specimen Collection     None      Pending Results     No orders found from 10/31/2017 to 11/3/2017.            Pending Culture Results     No orders found from 10/31/2017 to 11/3/2017.            Thank you for choosing [facility]       Thank you for choosing [facility] for your care. Our goal is always to provide you with excellent care. Hearing back from our patients is one way we can continue to improve our services. Please take a few minutes to complete the written survey that you may receive in the mail after you visit with us. Thank you!        Art[employer] Mobile[employer]ariRule Information     Lenet gives you secure access to your electronic health record. If you see a primary care provider, you can also send messages to your care team and make appointments. If you have questions, please call your primary care clinic.  If you do not have a primary care provider, please call [phone] and they will assist you.        Care EveryWhere ID     This is your Care EveryWhere ID. This could be used by other organizations to access your [facility] medical records  [id number]        Equal Access to Services     AMARILIS PUGA : Smita Watkins, waangelida day, qaybta kaalmada chapo, geraldine matthews. So [facility] [phone].    ATENCIÓN: Si habla español, tiene a ojeda disposición servicios gratuitos de asistencia lingüística. Llame al [phone].    We comply with applicable federal civil rights laws and [place] laws. We do not discriminate on the basis of race, color, national origin, age, disability, sex, sexual orientation, or gender identity.            After Visit Summary       This is your record. Keep this with you and show to your community pharmacist(s) and  doctor(s) at your next visit.

## 2017-11-02 NOTE — ED AVS SNAPSHOT
[facility]    [address] [address]    Phone:  [phone]                                       Myrna Martinez   MRN: [id number]    Department:  [facility]   Date of Visit:  11/2/2017           After Visit Summary Signature Page     I have received my discharge instructions, and my questions have been answered. I have discussed any challenges I see with this plan with the nurse or doctor.    ..........................................................................................................................................  Patient/Patient Representative Signature      ..........................................................................................................................................  Patient Representative Print Name and Relationship to Patient    ..................................................               ................................................  Date                                            Time    ..........................................................................................................................................  Reviewed by Signature/Title    ...................................................              ..............................................  Date                                                            Time

## 2017-11-03 NOTE — DISCHARGE INSTRUCTIONS
Take Acyclovir as directed.   Use Acyclovir cream 5 times daily.   Follow up with PCP in 3 days.   Return to urgent care or emergency department with any increase in symptoms or concerns.

## 2017-11-03 NOTE — ED NOTES
Pt presents with an outbreak of vaginal herpes for 1-2 days. States it is painful to sit or stand.

## 2017-11-03 NOTE — ED PROVIDER NOTES
"  History     Chief Complaint   Patient presents with     Vaginitis     Pt reports she is having a herpes flare \"it is really painful\", \"Hard to sit\".     The history is provided by the patient. No  was used.     Myrna Martinez is a 27 year old female who presents with a genital herpes flare that started yesterday. No interventions for symptoms. Denies fever, chills, or night sweats. Eating and drinking well. Bowel and bladder are working well. Last genital herpes flare was in 2016. She's had genital herpes for 6 years.     Problem List:    Patient Active Problem List    Diagnosis Date Noted     NICHOLE (generalized anxiety disorder) 11/09/2016     Priority: Medium     ACP (advance care planning) 06/23/2016     Priority: Medium     Advance Care Planning 6/23/2016: ACP Review of Chart / Resources Provided:  Reviewed chart for advance care plan.  Myrna Martinez has no plan or code status on file. Discussed available resources and provided with information. Confirmed code status reflects current choices pending further ACP discussions.  Confirmed/documented legally designated decision makers.  Added by Asiya Martinez             Major depressive disorder, recurrent episode, moderate (H) 03/18/2016     Priority: Medium     Herpes genitalis in women 03/18/2016     Priority: Medium        Past Medical History:    Past Medical History:   Diagnosis Date     Anxiety      Chronic headache      Depression      Herpes genitalis in women      Seasonal allergies        Past Surgical History:    Past Surgical History:   Procedure Laterality Date     APPENDECTOMY       MAMMOPLASTY REDUCTION  2011       Family History:    Family History   Problem Relation Age of Onset     Depression Mother      Hypertension Mother      Migraines Mother      no longer     Depression Father      Bipolar Disorder Maternal Grandmother      Depression Maternal Grandmother      Hypertension Maternal Grandmother      Bipolar " "Disorder Paternal Grandmother      Migraines Brother        Social History:  Marital Status:  Single [1]  Social History   Substance Use Topics     Smoking status: Never Smoker     Smokeless tobacco: Never Used     Alcohol use 0.0 oz/week     0 Standard drinks or equivalent per week      Comment: 2x/month        Medications:      Ibuprofen (ADVIL PO)   acyclovir (ZOVIRAX) 400 MG tablet   acyclovir (ZOVIRAX) 5 % cream   etonogestrel (IMPLANON/NEXPLANON) 68 MG IMPL   sertraline (ZOLOFT) 100 MG tablet   ranitidine (ZANTAC) 300 MG tablet   hydrOXYzine (VISTARIL) 25 MG capsule   Prenatal Vit-Fe Fumarate-FA (PRENATAL MULTIVITAMIN  PLUS IRON) 27-0.8 MG TABS per tablet   [DISCONTINUED] ValACYclovir HCl (VALTREX PO)         Review of Systems   Constitutional: Negative for activity change, appetite change, chills and fever.   HENT: Negative for trouble swallowing.    Respiratory: Negative for cough.    Gastrointestinal: Negative for abdominal pain, diarrhea, nausea and vomiting.   Genitourinary: Positive for genital sores. Negative for decreased urine volume, difficulty urinating, dysuria, flank pain, frequency, hematuria, pelvic pain, vaginal bleeding, vaginal discharge and vaginal pain.   Skin: Negative for rash.   Neurological: Negative for numbness.   Psychiatric/Behavioral: Negative.        Physical Exam   BP: 113/67  Heart Rate: 94  Temp: 97.7  F (36.5  C)  Resp: 16  Height: 157.5 cm (5' 2\")  SpO2: 97 %      Physical Exam   Constitutional: She is oriented to person, place, and time. She appears well-developed and well-nourished. No distress.   HENT:   Head: Normocephalic.   Mouth/Throat: Oropharynx is clear and moist.   Neck: Normal range of motion. Neck supple.   Cardiovascular: Normal rate, regular rhythm and normal heart sounds.    No murmur heard.  Pulmonary/Chest: Effort normal. No respiratory distress.   Abdominal: Soft. She exhibits no distension.   Genitourinary:       No vaginal discharge found.   Genitourinary " Comments: Vesicle with erythema base to left labia majora. Vesicle is TTP. No drainage from vesicle. Vesicle with erythema base to 11 o'clock region on anus that is TTP. No drainage from vesicle.    Musculoskeletal: Normal range of motion.   Lymphadenopathy:     She has no cervical adenopathy.   Neurological: She is alert and oriented to person, place, and time.   Skin: Skin is warm and dry. She is not diaphoretic.   Psychiatric: She has a normal mood and affect. Her behavior is normal.   Nursing note and vitals reviewed.      ED Course     ED Course     Procedures      Assessments & Plan (with Medical Decision Making)     Discussed plan of care. She verbalized understanding. All questions answered.     I have reviewed the nursing notes.    I have reviewed the findings, diagnosis, plan and need for follow up with the patient.  Discharged in stable condition.     New Prescriptions    ACYCLOVIR (ZOVIRAX) 400 MG TABLET    Take 1 tablet (400 mg) by mouth 3 times daily for 5 days    ACYCLOVIR (ZOVIRAX) 5 % CREAM    Apply topically 5 times daily for 5 days       Final diagnoses:   Recurrent genital herpes     Take Acyclovir as directed.   Use Acyclovir cream 5 times daily.   Follow up with PCP in 3 days.   Return to urgent care or emergency department with any increase in symptoms or concerns.     BALBINA Brown  11/2/2017  8:08 PM  URGENT CARE CLINIC       Maye Mckeon NP  11/02/17 2053

## 2017-11-16 ENCOUNTER — OFFICE VISIT (OUTPATIENT)
Dept: BEHAVIORAL HEALTH | Facility: OTHER | Age: 27
End: 2017-11-16
Attending: SOCIAL WORKER
Payer: COMMERCIAL

## 2017-11-16 DIAGNOSIS — F33.1 MAJOR DEPRESSIVE DISORDER, RECURRENT EPISODE, MODERATE (H): ICD-10-CM

## 2017-11-16 DIAGNOSIS — F41.1 GAD (GENERALIZED ANXIETY DISORDER): Primary | ICD-10-CM

## 2017-11-16 DIAGNOSIS — R69 DIAGNOSIS DEFERRED: Primary | ICD-10-CM

## 2017-11-16 PROCEDURE — 90791 PSYCH DIAGNOSTIC EVALUATION: CPT | Performed by: SOCIAL WORKER

## 2017-11-16 PROCEDURE — 99207 ZZC NO CHARGE BEHAVIORAL WARM HANDOFF: CPT

## 2017-11-16 ASSESSMENT — PATIENT HEALTH QUESTIONNAIRE - PHQ9
SUM OF ALL RESPONSES TO PHQ QUESTIONS 1-9: 6
5. POOR APPETITE OR OVEREATING: SEVERAL DAYS

## 2017-11-16 ASSESSMENT — ANXIETY QUESTIONNAIRES
GAD7 TOTAL SCORE: 13
6. BECOMING EASILY ANNOYED OR IRRITABLE: NEARLY EVERY DAY
3. WORRYING TOO MUCH ABOUT DIFFERENT THINGS: NEARLY EVERY DAY
2. NOT BEING ABLE TO STOP OR CONTROL WORRYING: NEARLY EVERY DAY
7. FEELING AFRAID AS IF SOMETHING AWFUL MIGHT HAPPEN: NOT AT ALL
1. FEELING NERVOUS, ANXIOUS, OR ON EDGE: MORE THAN HALF THE DAYS
5. BEING SO RESTLESS THAT IT IS HARD TO SIT STILL: SEVERAL DAYS

## 2017-11-16 NOTE — PROGRESS NOTES
"[facility]  [facility]: [place]      Social Work Care Navigator Note      Patient: Myrna Martinez  Date: November 16, 2017  Preferred Name: Myrna    Previous PHQ-9:   PHQ-9 SCORE 4/12/2017 5/23/2017 6/21/2017   Total Score 17 13 3     Previous NICHOLE-7:   NICHOLE-7 SCORE 4/12/2017 5/23/2017 6/21/2017   Total Score 18 14 5     RAMO LEVEL:  RAMO Score (Last Two) 11/10/2016   RAMO Raw Score 27   Activation Score 47.4   RAMO Level 2       Preferred Contact: @[facility]([id number])@  Type of Contact Today: Face to Face in Clinic      Data: (subjective / Objective):  Patient Goals Areas: Goal Areas: Mental Health  Patient Stated Goals: Patient stated goals: \" I want to not let things cloud my vision so easily\"  Recent ED/IP Admission or Discharge?   [place] ED Admission date: 11/02/17    Patient came in to complete the comprehensive wellness assessment for [facility].  See [facility] Flowsheets for details on the assessment.  See Letters, [facility] for a copy of the patient's care plan.    Charity Rodriguez        "

## 2017-11-16 NOTE — PROGRESS NOTES
I have reviewed and agree with the information in this note as accurate and appropriate.    Cosigned by Sandee Waller, MSW, [facility], [facility] [place]

## 2017-11-16 NOTE — MR AVS SNAPSHOT
After Visit Summary   11/16/2017    Myrna Martinez    MRN: [id number]           Patient Information     Date Of Birth          1990        Visit Information        Provider Department      11/16/2017 9:00 AM Cecily Almodovar LSW [facility] [place]        Today's Diagnoses     Diagnosis deferred    -  1       Follow-ups after your visit        Who to contact     If you have questions or need follow up information about today's clinic visit or your schedule please contact [facility] [place] directly at [phone].  Normal or non-critical lab and imaging results will be communicated to you by Action Online Publishinghart, letter or phone within 4 business days after the clinic has received the results. If you do not hear from us within 7 days, please contact the clinic through [employer] or phone. If you have a critical or abnormal lab result, we will notify you by phone as soon as possible.  Submit refill requests through lifeIO or call your pharmacy and they will forward the refill request to us. Please allow 3 business days for your refill to be completed.          Additional Information About Your Visit        MyChart Information     lifeIO gives you secure access to your electronic health record. If you see a primary care provider, you can also send messages to your care team and make appointments. If you have questions, please call your primary care clinic.  If you do not have a primary care provider, please call [phone] and they will assist you.        Care EveryWhere ID     This is your Care EveryWhere ID. This could be used by other organizations to access your [facility] medical records  [id number]         Blood Pressure from Last 3 Encounters:   11/02/17 113/67   06/21/17 102/60   05/23/17 118/70    Weight from Last 3 Encounters:   06/21/17 154 lb (69.9 kg)   05/23/17 155 lb (70.3 kg)   04/12/17 152 lb (68.9 kg)              Today, you had the following     No orders found for display        Primary Care Provider Office Phone # Fax #    Nivia Jimenes [phone]       [facility] [place] [address]  [place] [address]        Equal Access to Services     AMARILIS PUGA : Hadii cecilio ku hadlolitao Soomaali, waaxda luqadaha, qaybta kaalmada adeegyada, geraldine ruffinn paoangella vargas annie matthews. So [facility] [phone].    ATENCIÓN: Si habla español, tiene a ojeda disposición servicios gratuitos de asistencia lingüística. Llame al [phone].    We comply with applicable federal civil rights laws and [place] laws. We do not discriminate on the basis of race, color, national origin, age, disability, sex, sexual orientation, or gender identity.            Thank you!     Thank you for choosing [facility]  for your care. Our goal is always to provide you with excellent care. Hearing back from our patients is one way we can continue to improve our services. Please take a few minutes to complete the written survey that you may receive in the mail after your visit with us. Thank you!             Your Updated Medication List - Protect others around you: Learn how to safely use, store and throw away your medicines at www.disposemymeds.org.          This list is accurate as of: 11/16/17  2:56 PM.  Always use your most recent med list.                   Brand Name Dispense Instructions for use Diagnosis    acyclovir 400 MG tablet    ZOVIRAX    15 tablet    Take 1 tablet (400 mg) by mouth 3 times daily for 5 days        ADVIL PO      Take 400 mg by mouth every 6 hours as needed for moderate pain        etonogestrel 68 MG Impl    IMPLANON/NEXPLANON     1 each by Subdermal route once        hydrOXYzine 25 MG capsule    VISTARIL    60 capsule    Take 1 capsule (25 mg) by mouth 4 times daily as needed for anxiety    Mood swings (H), NICHOLE (generalized anxiety disorder)       prenatal multivitamin plus iron 27-0.8 MG Tabs per tablet     100 tablet    Take 1 tablet by mouth daily    NICHOLE  (generalized anxiety disorder), Mood swings (H)       ranitidine 300 MG tablet    ZANTAC    90 tablet    Take 1 tablet (300 mg) by mouth At Bedtime    Gastroesophageal reflux disease without esophagitis       sertraline 100 MG tablet    ZOLOFT    90 tablet    Take 1 tablet (100 mg) by mouth daily    Moderate episode of recurrent major depressive disorder (H), NICHOLE (generalized anxiety disorder)

## 2017-11-16 NOTE — MR AVS SNAPSHOT
After Visit Summary   11/16/2017    Myrna Martinez    MRN: [id number]           Patient Information     Date Of Birth          1990        Visit Information        Provider Department      11/16/2017 9:00 AM [facility]        Today's Diagnoses     NICHOLE (generalized anxiety disorder)    -  1    Major depressive disorder, recurrent episode, moderate (H)           Follow-ups after your visit        Who to contact     If you have questions or need follow up information about today's clinic visit or your schedule please contact [facility] directly at [phone].  Normal or non-critical lab and imaging results will be communicated to you by INgrooveshart, letter or phone within 4 business days after the clinic has received the results. If you do not hear from us within 7 days, please contact the clinic through INgrooveshart or phone. If you have a critical or abnormal lab result, we will notify you by phone as soon as possible.  Submit refill requests through CASTT or call your pharmacy and they will forward the refill request to us. Please allow 3 business days for your refill to be completed.          Additional Information About Your Visit        MyChart Information     CASTT gives you secure access to your electronic health record. If you see a primary care provider, you can also send messages to your care team and make appointments. If you have questions, please call your primary care clinic.  If you do not have a primary care provider, please call [phone] and they will assist you.        Care EveryWhere ID     This is your Care EveryWhere ID. This could be used by other organizations to access your [facility] medical records  [id number]         Blood Pressure from Last 3 Encounters:   11/02/17 113/67   06/21/17 102/60   05/23/17 118/70    Weight from Last 3 Encounters:   06/21/17 154 lb (69.9 kg)   05/23/17 155 lb (70.3 kg)   04/12/17 152 lb  (68.9 kg)              Today, you had the following     No orders found for display       Primary Care Provider Office Phone # Fax #    Nivia Jimenes [phone]       [facility] [place] [address]  [facility] [address]        Equal Access to Services     [facility] VIVIEN : Hadii aad ku hadasho Soomaali, waaxda luqadaha, qaybta kaalmada adeegyada, waxay remain hayaan donovan mccrayshaynearthur laaldair . So [facility] [phone].    ATENCIÓN: Si habla español, tiene a ojeda disposición servicios gratuitos de asistencia lingüística. Llame al [phone].    We comply with applicable federal civil rights laws and [place] laws. We do not discriminate on the basis of race, color, national origin, age, disability, sex, sexual orientation, or gender identity.            Thank you!     Thank you for choosing [facility]  for your care. Our goal is always to provide you with excellent care. Hearing back from our patients is one way we can continue to improve our services. Please take a few minutes to complete the written survey that you may receive in the mail after your visit with us. Thank you!             Your Updated Medication List - Protect others around you: Learn how to safely use, store and throw away your medicines at www.disposemymeds.org.          This list is accurate as of: 11/16/17  3:23 PM.  Always use your most recent med list.                   Brand Name Dispense Instructions for use Diagnosis    acyclovir 400 MG tablet    ZOVIRAX    15 tablet    Take 1 tablet (400 mg) by mouth 3 times daily for 5 days        ADVIL PO      Take 400 mg by mouth every 6 hours as needed for moderate pain        etonogestrel 68 MG Impl    IMPLANON/NEXPLANON     1 each by Subdermal route once        hydrOXYzine 25 MG capsule    VISTARIL    60 capsule    Take 1 capsule (25 mg) by mouth 4 times daily as needed for anxiety    Mood swings (H), NICHOLE (generalized anxiety disorder)       prenatal multivitamin plus  iron 27-0.8 MG Tabs per tablet     100 tablet    Take 1 tablet by mouth daily    NICHOLE (generalized anxiety disorder), Mood swings (H)       ranitidine 300 MG tablet    ZANTAC    90 tablet    Take 1 tablet (300 mg) by mouth At Bedtime    Gastroesophageal reflux disease without esophagitis       sertraline 100 MG tablet    ZOLOFT    90 tablet    Take 1 tablet (100 mg) by mouth daily    Moderate episode of recurrent major depressive disorder (H), NICHOLE (generalized anxiety disorder)

## 2017-11-16 NOTE — PROGRESS NOTES
[facility]  Integrated Behavioral Health Services   Diagnostic Assessment    PATIENT'S NAME: Myrna Martinez  MRN:   [id number]  :   1990  DATE OF SERVICE: 2017  SERVICE LOCATION: Face to Face in Clinic  Visit Activities: NEW and [facility] Only    Identifying Information:  Patient is a 26 year old year old, , partnered / significant other female.  Patient attended the session alone.      Referral:  Patient was referred for an assessment by self.   Reason for referral: clarify behavioral health diagnosis, determine behavioral health treatment options and assess client readiness and motivation to change.     Patient's Statement of Presenting Concern:  Patient reports the following reason(s) for seeking an assessment at this time: Myrna is familiar to this [facility] as she had a theraputic relationship last year.  She had gone through relationship stressors including a break up which she decided to move to the [facility].  She had lived there for about 3 months, re-kindled her relationship with her partner and moved back to be with him.  She has returned to clinic to re-establish care and re-start services with the [facility] and [facility] in therapy.  Patient stated that her symptoms have resulted in the following functional impairments: childcare / parenting, health maintenance, organization, relationship(s), social interactions and concentration    History of Presenting Concern:  Patient reports that these problem(s) began in childhood. Patient has attempted to resolve these concerns in the past through: counseling, inpatient mental health services, medication(s) from physician / PCP and physician / PCP. Patient reports that other professional(s) are involved in providing support / services. PCP.  Adding additional support services of [facility] and [facility] therapy services.    Social History:  Patient reported she grew up in [place], [place]. They were the first born of 2 children- Has one  brother, states that they were very close to her brother when younger.  Relationship has become more distant.  Patient reported that her childhood was OK- moved around a lot.  Patient reported a history of 1 committed relationships. Patient has been partnered / significant other for 5 years. Has been off and on in this relationship, but is now back together.  Patient and partner has 1 child, Son Kareem Swann- 3 years old. Patient identified some stable and meaningful social connections.     Patient lives with significant other and Son.  Mom is staying with patient temporarily, will be finding her own apartment.  Patient is currently employed part time.  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  Previous work history includes [employer]    Patient reported that she has not been involved with the legal system.      Patient's highest education level was high school graduate. Patient did identify the following learning problems: concentration, reading and math- ADHD type.  Currently enrolled in school, [employer] generals completed, starting classes in January.  Thinking of going for [employer], medical field, construction field or counseling.    Patient did not serve in the .     Patient indicates spirituality as being important to her, belongs to [employer].      Mental Health History:  Patient reported the following biological family members or relatives with mental health issues: Mother experienced Anxiety, Bipolar Disorder, Depression and suicidal ideation. Patient has received the following mental health services in the past: counseling, inpatient mental health services, physician / PCP and medication(s) from physician / PCP. Patient is not currently receiving any mental health services.  Was previously seeing this [facility] in therapy, however with move had not been seeing anyone other than PCP.  Is interested in re-starting therapy.    [facility]  History:  Patient reported the following biological family members or relatives with chemical health issues: Mother reportedly used alcohol . Patient has not received chemical dependency treatment in the past. Patient is not currently receiving any chemical dependency treatment. Patient reports no problems as a result of their drinking / drug use.    Cage-AID score is: Negative. Based on Cage-Aid score and clinical interview there  are not indications of drug or alcohol abuse    Discussed the general effects of drugs and alcohol on health and well-being.    Significant Losses / Trauma / Abuse / Neglect Issues:  There are no indications or report of: significant losses, trauma, abuse or neglect.  Issues of possible neglect are not present.    Medical History:   See patient medical record for problem list and current medications.      Medication Adherence:  Client reports not taking psychiatric medications as prescribed. Client states reason for medication non-adherence as not having scripts available after her move.. Strategies for addressing obstacles to medication adherence include Plans to make appointment with PCP. Client accepted strategies to improve medication adherence.    Patient was provided recommendation to follow-up with physician.    Mental Status Assessment:  Appearance:   Appropriate   Eye Contact:   Good   Psychomotor Behavior: Normal   Attitude:   Cooperative   Orientation:   All  Speech   Rate / Production: Normal    Volume:  Normal   Mood:    Anxious  Irritable   Affect:    Appropriate   Thought Content:  Clear   Thought Form:  Coherent  Logical  Circumstantial  Insight:    Fair     Safety Issues and Plan for Safety and Risk Management:  Patient has had a history of suicidal ideation: in the past.  none current and suicide attempts: one time in the past.  No other attempts since then  Patient denies current fears or concerns for personal safety.    Patient denies current or recent suicidal  ideation or behaviors.  Patient reports that she has a fear of dying.  Patient denies current or recent homicidal ideation or behaviors.  Patient denies current or recent self injurious behavior or ideation.  Patient denies other safety concerns.  Patient reports there are firearms in the house. The firearms are secured in a locked space  A safety and risk management plan has not been developed at this time, however client was given the after-hours number / 911 should there be a change in any of these risk factors.    Review of Symptoms:  Depression: Interest Guilt Energy Irritability tearfulness  Becka:  Distractibility Impulsiveness Racing Thoughts Pressured Speech  Psychosis: No symptoms  Anxiety: Worries Nervousness  Panic:  Shortness of Breath Sense of Impending Doom  Post Traumatic Stress Disorder: No symptoms  Obsessive Compulsive Disorder: Cleaning  Eating Disorder: overeating  Oppositional Defiant Disorder: experiencing iritability more lately  ADD / ADHD: No symptoms Task Completion Organization Forgetful  Conduct Disorder: No symptoms    Patient's Strengths and Limitations:  Client identified the following strengths or resources that will help her succeed in counseling: Islam, commitment to health and well being, arvind / spirituality, family support and good employment history and likes her job. Client identified the following supports: family, Mormon / spirituality and friends. Things that may interfere with the clients success in counseling include:few friends, financial hardship, transportation concerns and Patient indicates only haveing one care in the family, sometimes difficult to get out due to this..    Diagnostic Criteria:  NICHOLE:  A. Excessive anxiety and worry about a number of events or activities (such as work or school performance).   B. The person finds it difficult to control the worry.  C. Select 3 or more symptoms (required for diagnosis). Only one item is required in children.   -  Restlessness or feeling keyed up or on edge.    - Being easily fatigued.    - Difficulty concentrating or mind going blank.    - Irritability.   D. The focus of the anxiety and worry is not confined to features of an Axis I disorder.  E. The anxiety, worry, or physical symptoms cause clinically significant distress or impairment in social, occupational, or other important areas of functioning.   F. The disturbance is not due to the direct physiological effects of a substance (e.g., a drug of abuse, a medication) or a general medical condition (e.g., hyperthyroidism) and does not occur exclusively during a Mood Disorder, a Psychotic Disorder, or a Pervasive Developmental Disorder.  1. Recurrent unexpected panic attacks and meets criteria 2, 3, and 4 (below)  3. Absence of agoraphobia  4. The panic attacks are not to the the direct physiological effects of a substance or general medical condition  5. The panic attacks are not better accounted for by another mental disorder, such as social phobia, specific phobia, OCD, PTSD, or separation anxiety disorder  BIPOLAR II DISORDER:  Current Hypomanic Symptoms includes:  History of Hypomania, symptoms included:  A. A distinct period of abnormally and persistently elevated, expansive, or irritable mood and abnormally and persistently increased activity or energy lasting at least 4 consecutive days and presentmost of the day, nearly every day.  B. During the period of mood disturbance and increased energy and activity, three (or more) of the following symptoms have persisted (four if the mood is only irritable), represent a nonticeable change from usual behaivor, and have been present to a degree:   - more talkative than usual or pressure to keep talking    - flight of ideas or subjective experience that thoughts are racing   - distractibility  C. The episode is associated with an unequivocal change in functioning that is uncharacteristic of the person when not symptomatic  D.  The disturbance in mood and the change in functioning are observable by others  E. The episode is not severe enough to cause marked impairment in social or occupational functioning or to necessitate hospitalization, and does not have psychotic features.  F. The symptoms are not attributable to the direct physiological effects of a substance or a general medical condition  MDD  CRITERIA (A-C) REPRESENT A MAJOR DEPRESSIVE EPISODE - SELECT THESE CRITERIA  A) Recurrent episode(s) - symptoms have been present during the same 2-week period and represent a change from previous functioning 5 or more symptoms (required for diagnosis)   - Depressed mood. Note: In children and adolescents, can be irritable mood.     - Diminished interest or pleasure in all, or almost all, activities.    - Significant weight gainincrease in appetite.    - Increased sleep.    - Fatigue or loss of energy.    - Diminished ability to think or concentrate, or indecisiveness.   B) The symptoms cause clinically significant distress or impairment in social, occupational, or other important areas of functioning  C) The episode is not attributable to the physiological effects of a substance or to another medical condition  D) The occurence of major depressive episode is not better explained by other thought / psychotic disorders  E) There has never been a manic episode or hypomanic episode    Functional Status:  Client's symptoms have caused reduced functional status in the following areas: Financial management managing bills and other financial stressors  Follow through with Medical recommendations - medication compliance.  Occupational / Vocational - maintaining jobs  Social / Relational - desire to engage in social activities or interact with friends    DSM5 Diagnoses: (Sustained by DSM5 Criteria Listed Above)  Diagnoses: 296.32 (F33.1) Major Depressive Disorder, Recurrent Episode, Moderate _  300.02 (F41.1) Generalized Anxiety Disorder; 296.89  Bipolar II Disorder Hypomanic and Depressed- Rule out; Panic disorder- Rule out  Psychosocial & Contextual Factors: Patient describes issues with relationship stressors, financial stressors, parenting stressors and other general life day-to-day stressors  WHODAS Score: 26  See Media section of EPIC medical record for completed WHODAS    Preliminary Treatment Plan:    Initial Treatment will focus on: Depressed Mood - feelings of overwhelmingness  Anxiety - excessive worry.    Chemical dependency recommendations: No indications of CD issues    As a preliminary treatment goal, patient will develop more effective coping skills to manage depressive symptoms and will develop more effective coping skills to manage anxiety symptoms.    The focus of initial interventions will be to teach mindfulness skills, teach relaxation strategies and teach stress mangement techniques.    Collaboration with other professionals is not indicated at this time.    Referral to another professional/service is not indicated at this time.    A Release of Information has been obtained for the following: [facility].    Report to child or adult protection services was NA.    Sandee Waller [facility], Behavioral Health Clinician

## 2017-11-17 ASSESSMENT — ANXIETY QUESTIONNAIRES: GAD7 TOTAL SCORE: 13

## 2017-12-27 ENCOUNTER — OFFICE VISIT (OUTPATIENT)
Dept: OBGYN | Facility: OTHER | Age: 27
End: 2017-12-27
Attending: ADVANCED PRACTICE MIDWIFE
Payer: COMMERCIAL

## 2017-12-27 VITALS
HEIGHT: 62 IN | OXYGEN SATURATION: 99 % | DIASTOLIC BLOOD PRESSURE: 78 MMHG | HEART RATE: 73 BPM | BODY MASS INDEX: 28.71 KG/M2 | SYSTOLIC BLOOD PRESSURE: 116 MMHG | WEIGHT: 156 LBS

## 2017-12-27 DIAGNOSIS — Z30.46 NEXPLANON REMOVAL: ICD-10-CM

## 2017-12-27 DIAGNOSIS — Z30.09 FAMILY PLANNING: Primary | ICD-10-CM

## 2017-12-27 LAB — HCG UR QL: NEGATIVE

## 2017-12-27 PROCEDURE — 11982 REMOVE DRUG IMPLANT DEVICE: CPT | Performed by: ADVANCED PRACTICE MIDWIFE

## 2017-12-27 PROCEDURE — 81025 URINE PREGNANCY TEST: CPT | Mod: ZL | Performed by: ADVANCED PRACTICE MIDWIFE

## 2017-12-27 PROCEDURE — 99202 OFFICE O/P NEW SF 15 MIN: CPT | Mod: 25 | Performed by: ADVANCED PRACTICE MIDWIFE

## 2017-12-27 PROCEDURE — 99213 OFFICE O/P EST LOW 20 MIN: CPT

## 2017-12-27 ASSESSMENT — PAIN SCALES - GENERAL: PAINLEVEL: NO PAIN (0)

## 2017-12-27 NOTE — PROGRESS NOTES
"Myrna Martinez is a 27 year old female  Here today for family planning.  G 1P 1   x one.  Currently using the Nexplanon.  Desires removal today.  Desire pregnancy.    Had chicken pox as a child.  Vaccinations up to date (including MMR)  Has not traveled to Zika infested areas     Instructed:  Take prenatal with 800 mcg Folate daily  Avoid Zika infested areas  Avoid alcohol and illicit drugs       ROS:  CONSTITUTIONAL: NEGATIVE for fever, chills, change in weight  RESP: NEGATIVE for significant cough or SOB  CV: NEGATIVE for chest pain, palpitations or peripheral edema  GI: NEGATIVE for nausea, abdominal pain, heartburn, or change in bowel habits  : NEGATIVE for frequency, dysuria, hematuria, vaginal discharge  PSYCHIATRIC: NEGATIVE for changes in mood or affect    Procedure:  Nexplanon removal  After obtaining consent from the patient the nexplanon was removed.  Her arm was prepped copiously with betadine and 1% lidocaine ~ 1 cc was injected into her arm over the nexplanon liu.  A 0.8 cm incision was made over the previous scar and the nexplanon liu was removed without difficulty. Pressure was placed over the incision and minimal bleeding was noted.  It was covered with 1/2 inch steri strips and the arm was bandaged with a kerlex guaze.  She will leave that on for at least 24 hrs.    She tolerated the procedure well.  No complications.       O:   /78 (BP Location: Left arm, Patient Position: Chair, Cuff Size: Adult Regular)  Pulse 73  Ht 5' 2\" (1.575 m)  Wt 156 lb (70.8 kg)  SpO2 99%  BMI 28.53 kg/m2   Pleasant without acute distress.    A:  Family planning  Nexplanon in left arm    P:  Nexplanon removal  Preconception questions, advice, and instructions    Greater than 20 minutes of this 30 minute visit were spent face to face counseling this patient about Nexplanon removal procedure and preconception instructions.    EVELIO Lindsay, ANDREY    "

## 2017-12-27 NOTE — PATIENT INSTRUCTIONS
Return to office as needed.    Thank you for allowing Jose L FRASER CNM and our OB team to participate in your care.  If you have a scheduling or an appointment question please contact [facility] Unit Coordinator at their direct line [phone].   ALL nursing questions or concerns can be directed to your OB nurse at: [phone] - [place]

## 2017-12-27 NOTE — NURSING NOTE
"Chief Complaint   Patient presents with     Contraception     Nexplanon removal        Initial /78 (BP Location: Left arm, Patient Position: Chair, Cuff Size: Adult Regular)  Pulse 73  Ht 5' 2\" (1.575 m)  Wt 156 lb (70.8 kg)  SpO2 99%  BMI 28.53 kg/m2 Estimated body mass index is 28.53 kg/(m^2) as calculated from the following:    Height as of this encounter: 5' 2\" (1.575 m).    Weight as of this encounter: 156 lb (70.8 kg).  Medication Reconciliation: julia Villalobos      "

## 2017-12-27 NOTE — MR AVS SNAPSHOT
After Visit Summary   12/27/2017    Myrna Martinez    MRN: [id number]           Patient Information     Date Of Birth          1990        Visit Information        Provider Department      12/27/2017 9:00 AM Jose L Tan APRN CNM [facility]        Today's Diagnoses     Family planning    -  1    Nexplanon removal          Care Instructions    Return to office as needed.    Thank you for allowing Jose L FRASER, ANDREY and our OB team to participate in your care.  If you have a scheduling or an appointment question please contact [facility] Health Unit Coordinator at their direct line [phone].   ALL nursing questions or concerns can be directed to your OB nurse at: [phone] - [place]              Follow-ups after your visit        Who to contact     If you have questions or need follow up information about today's clinic visit or your schedule please contact [facility] directly at [phone].  Normal or non-critical lab and imaging results will be communicated to you by MyChart, letter or phone within 4 business days after the clinic has received the results. If you do not hear from us within 7 days, please contact the clinic through [facility] or phone. If you have a critical or abnormal lab result, we will notify you by phone as soon as possible.  Submit refill requests through [facility] or call your pharmacy and they will forward the refill request to us. Please allow 3 business days for your refill to be completed.          Additional Information About Your Visit        MyChart Information     [facility] gives you secure access to your electronic health record. If you see a primary care provider, you can also send messages to your care team and make appointments. If you have questions, please call your primary care clinic.  If you do not have a primary care provider, please call [phone] and they will assist you.        Care EveryWhere ID     This is your  "Care EveryWhere ID. This could be used by other organizations to access your [facility] medical records  [id number]        Your Vitals Were     Pulse Height Pulse Oximetry BMI (Body Mass Index)          73 5' 2\" (1.575 m) 99% 28.53 kg/m2         Blood Pressure from Last 3 Encounters:   12/27/17 116/78   11/02/17 113/67   06/21/17 102/60    Weight from Last 3 Encounters:   12/27/17 156 lb (70.8 kg)   06/21/17 154 lb (69.9 kg)   05/23/17 155 lb (70.3 kg)              We Performed the Following     HCG qualitative urine     [facility]        Primary Care Provider Office Phone # Fax #    Nivia Jimenes [phone]       [facility] [place] [address]  [place] [address]        Equal Access to Services     [facility]: Hadii aad ku hadasho Soomaali, waaxda luqadaha, qaybta kaalmada adeegyada, waxay idiin hayaan donovan valencia . So [facility] [phone].    ATENCIÓN: Si habla español, tiene a ojeda disposición servicios gratuitos de asistencia lingüística. Llame al [phone].    We comply with applicable federal civil rights laws and [place] laws. We do not discriminate on the basis of race, color, national origin, age, disability, sex, sexual orientation, or gender identity.            Thank you!     Thank you for choosing [facility]  for your [facility]. Our goal is always to provide you with excellent care. Hearing back from our patients is one way we can continue to improve our services. Please take a few minutes to complete the written survey that you may receive in the mail after your visit with us. Thank you!             Your Updated Medication List - Protect others around you: Learn how to safely use, store and throw away your medicines at www.disposemymeds.org.          This list is accurate as of: 12/27/17 11:38 AM.  Always use your most recent med list.                   Brand Name Dispense Instructions for use Diagnosis    acyclovir 400 MG tablet    ZOVIRAX    " 15 tablet    Take 1 tablet (400 mg) by mouth 3 times daily for 5 days        ADVIL PO      Take 400 mg by mouth every 6 hours as needed for moderate pain        hydrOXYzine 25 MG capsule    VISTARIL    60 capsule    Take 1 capsule (25 mg) by mouth 4 times daily as needed for anxiety    Mood swings (H), NICHOLE (generalized anxiety disorder)       prenatal multivitamin plus iron 27-0.8 MG Tabs per tablet     100 tablet    Take 1 tablet by mouth daily    NICHOLE (generalized anxiety disorder), Mood swings (H)       ranitidine 300 MG tablet    ZANTAC    90 tablet    Take 1 tablet (300 mg) by mouth At Bedtime    Gastroesophageal reflux disease without esophagitis       sertraline 100 MG tablet    ZOLOFT    90 tablet    Take 1 tablet (100 mg) by mouth daily    Moderate episode of recurrent major depressive disorder (H), NICHOLE (generalized anxiety disorder)

## 2018-01-30 ENCOUNTER — TELEPHONE (OUTPATIENT)
Dept: BEHAVIORAL HEALTH | Facility: OTHER | Age: 28
End: 2018-01-30

## 2018-01-30 NOTE — TELEPHONE ENCOUNTER
[facility]  @FLOW([id number])@      Social Work Care Navigator Note      Patient: Myrna Martinez  Date: January 30, 2018  Preferred Name: Myrna    Previous PHQ-9:   PHQ-9 SCORE 5/23/2017 6/21/2017 11/16/2017   Total Score 13 3 6     Previous NICHOLE-7:   NICHOLE-7 SCORE 5/23/2017 6/21/2017 11/16/2017   Total Score 14 5 13     RAMO LEVEL:  RAMO Score (Last Two) 11/10/2016 11/16/2017   RAMO Raw Score 27 30   Activation Score 47.4 56   RAMO Level 2 3       Preferred Contact: @[email or web address]([id number])@  Type of Contact Today: Phone call (not reached/unavailable)      Data: (subjective / Objective):  Patient Goals Areas: @FLOW([id number])@  Patient Stated Goals: @FLOW([id number])@  Recent ED/IP Admission or Discharge?   None  Attempted to reach patient, but was unsuccessful.  Plan to attempt again.  Sandee Waller

## 2018-04-02 ENCOUNTER — TELEPHONE (OUTPATIENT)
Dept: BEHAVIORAL HEALTH | Facility: OTHER | Age: 28
End: 2018-04-02

## 2018-04-02 NOTE — TELEPHONE ENCOUNTER
"[facility]  @FLOW([id number])@      Social Work Care Navigator Note      Patient: Myrna Martinez  Date: April 2, 2018  Preferred Name: Myrna    Previous PHQ-9:   PHQ-9 SCORE 5/23/2017 6/21/2017 11/16/2017   Total Score 13 3 6     Previous NICHOLE-7:   NICHOLE-7 SCORE 5/23/2017 6/21/2017 11/16/2017   Total Score 14 5 13     RAMO LEVEL:  RAMO Score (Last Two) 11/10/2016 11/16/2017   RAMO Raw Score 27 30   Activation Score 47.4 56   RAMO Level 2 3       Preferred Contact: @MARION([id number])@  Type of Contact Today: Phone call (patient / identified key support person reached)      Data: (subjective / Objective):  Patient Goals Areas: @FLOW([id number])@  Patient Stated Goals: @FLOW([id number])@  Recent ED/IP Admission or Discharge?   None  Recent ED/IP Admission or Discharge?   None    Patient Goals:  Goal Areas: Mental Health  Patient stated goals: \" I want to not let things cloud my vision so easily\"      [facility] Core Service Provided:  Care Coordination: provided care management services/referrals necessary to ensure patient and their identified supports have access to medical, behavioral health, pharmacology and recovery support services.  Ensured that patient's care is integrated across all settings and services.   Health and Wellness Promotion    Current Stressors / Issues / Care Plan Objective Addressed Today:  Patient stated she would like to come in to the clinic this week to meet with [facility].    Intervention:  Motivational Interviewing: Open-ended questions       Assessment: (Progress on Goals / Homework):  Received message that patient called clinic to schedule appointment to see [facility] this week. Spoke with patient who is interested in continuing with [facility] services. She asked if any appointments available this wednesday 4/4 with [facility]. Appointment scheduled 4/4/18 at 1:00pm.    Plan: (Homework, other):  Patient was encouraged to continue to seek condition-related information and education.      Scheduled a Clinic " follow up appointment with [facility] on 4/4/18.      Ava Echols,  Social Work CC                Next 5 appointments (look out 90 days)     Apr 04, 2018  1:00 PM CDT   (Arrive by 12:45 PM)   Return Visit with Sandee Waller [facility] [place] ([facility] [place] [facility]    [address] [place] [id number] [phone]

## 2018-04-05 ENCOUNTER — OFFICE VISIT (OUTPATIENT)
Dept: BEHAVIORAL HEALTH | Facility: OTHER | Age: 28
End: 2018-04-05
Attending: SOCIAL WORKER
Payer: COMMERCIAL

## 2018-04-05 DIAGNOSIS — F41.1 GAD (GENERALIZED ANXIETY DISORDER): Primary | ICD-10-CM

## 2018-04-05 DIAGNOSIS — F33.1 MAJOR DEPRESSIVE DISORDER, RECURRENT EPISODE, MODERATE (H): ICD-10-CM

## 2018-04-05 PROCEDURE — 90832 PSYTX W PT 30 MINUTES: CPT | Performed by: SOCIAL WORKER

## 2018-04-05 ASSESSMENT — ANXIETY QUESTIONNAIRES
1. FEELING NERVOUS, ANXIOUS, OR ON EDGE: MORE THAN HALF THE DAYS
5. BEING SO RESTLESS THAT IT IS HARD TO SIT STILL: NOT AT ALL
2. NOT BEING ABLE TO STOP OR CONTROL WORRYING: NEARLY EVERY DAY
7. FEELING AFRAID AS IF SOMETHING AWFUL MIGHT HAPPEN: MORE THAN HALF THE DAYS
6. BECOMING EASILY ANNOYED OR IRRITABLE: SEVERAL DAYS
GAD7 TOTAL SCORE: 12
3. WORRYING TOO MUCH ABOUT DIFFERENT THINGS: NEARLY EVERY DAY

## 2018-04-05 ASSESSMENT — PATIENT HEALTH QUESTIONNAIRE - PHQ9: 5. POOR APPETITE OR OVEREATING: SEVERAL DAYS

## 2018-04-05 NOTE — PROGRESS NOTES
[facility]  April 5, 2018    Behavioral Health Clinician Progress Note    Patient Name: Myrna Martinez         Service Type: Individual           Service Location:  Face to Face in Clinic      Session Start Time:  9:00  Session End Time: 9:45      Session Length: 16 - 37      Attendees: Patient    Visit Activities (Refresh list every visit): [facility] Only and [facility]      Diagnostic Assessment Date: 11/16/17  Treatment Plan Review Date: To be completed in next 3 visits.    Previous PHQ-9:   PHQ-9 6/21/2017 11/16/2017 4/5/2018   Total Score 3 6 10   Q9: Suicide Ideation Not at all Not at all Not at all     Previous NICHOLE-7:   NICHOLE-7 SCORE 6/21/2017 11/16/2017 4/5/2018   Total Score 5 13 12       Suicide Assessment Five-step Evaluation and Treatment (SAFE-T)  RAMO LEVEL:  RAMO Score (Last Two) 11/10/2016 11/16/2017   RAMO Raw Score 27 30   Activation Score 47.4 56   RAMO Level 2 3       DATA  Extended Session (60+ minutes): No  Interactive Complexity: No  Crisis: No  [facility] Patient No  Patient enrolled in [facility] services this date.    Treatment Objective(s) Addressed in This Session:  Target Behavior(s):  Depressed Mood: Increase interest, engagement, and pleasure in doing things  Decrease frequency and intensity of feeling down, depressed, hopeless  Identify negative self-talk and behaviors: challenge core beliefs, myths, and actions  Anxiety: will experience a reduction in anxiety, will develop more effective coping skills to manage anxiety symptoms, will develop healthy cognitive patterns and beliefs and will increase ability to function adaptively  Mood Instability: will develop better understanding of triggers and coping strategies to stabilize mood    Current Stressors / Issues:  Patient returned to clinic to resume services with [facility] and therapy with [facility].  States that she has had a lot of changes that continue to happen.  Also discusses relationship difficulties.  States she is wanting to get into  couples counseling, but would be looking for evening or weekend appointments.    Progress on Treatment Objective(s) / Homework:  New Objective established this session - CONTEMPLATION (Considering change and yet undecided); Intervened by assessing the negative and positive thinking (ambivalence) about behavior change    Motivational Interviewing    MI Intervention: Expressed Empathy/Understanding, Supported Autonomy, Collaboration, Evocation and Open-ended questions     Change Talk Expressed by the Patient: Desire to change Ability to change Reasons to change    Provider Response to Change Talk: E - Evoked more info from patient about behavior change and A - Affirmed patient's thoughts, decisions, or attempts at behavior change      SOLUTION FOCUSED: Explored patterns in patient's relationships and discussed options for new behaviors, Explored patterns in patient's actions and choices and discussed options for new behaviors  NARRATIVE THERAPY: Allowed patient to express themselves and their feelings in conversational mannor    Care Plan review completed: No    Medication Review:  No current psychiatric medications prescribed.  Patient states she hasn't been taking the meds.  Has Zoloft listed in Epic.    Medication Compliance:  NA    Changes in Health Issues:   None reported    Chemical Use Review:   Substance Use: Not reviewed this session     Tobacco Use: Not reviewed this session.    Assessment: Current Emotional / Mental Status (status of significant symptoms):    Risk status (Self / Other harm or suicidal ideation)  Patient denies current fears or concerns for personal safety.  Patient denies current or recent suicidal ideation or behaviors.  Patient denies current or recent homicidal ideation or behaviors.  Patient denies current or recent self injurious behavior or ideation.  Patient denies other safety concerns.  A safety and risk management plan has not been developed at this time, however patient was  encouraged to call [facility] should there be a change in any of these risk factors.    Appearance:   Appropriate   Eye Contact:   Good   Psychomotor Behavior: Normal   Attitude:   Cooperative   Orientation:   All  Speech   Rate / Production: Normal    Volume:  Normal   Mood:    Irritable   Affect:    Appropriate   Thought Content:  Clear   Thought Form:  Coherent  Logical   Insight:    Good     Diagnoses:  1. NICHOLE (generalized anxiety disorder)    2. Major depressive disorder, recurrent episode, moderate (H)        Collateral Reports Completed:  Not Applicable    Plan: (Homework, other):  Patient was given information about behavioral services and encouraged to schedule a follow up appointment with the clinic [facility] in 2 weeks.  She was also given [facility] Referral .  CD Recommendations: Maintain Sobriety.      DARLING Barboza, [facility]

## 2018-04-05 NOTE — MR AVS SNAPSHOT
After Visit Summary   4/5/2018    Myrna Martinez    MRN: [id number]           Patient Information     Date Of Birth          1990        Visit Information        Provider Department      4/5/2018 9:00 AM Sandee Waller [facility]        Today's Diagnoses     NICHOLE (generalized anxiety disorder)    -  1    Major depressive disorder, recurrent episode, moderate (H)           Follow-ups after your visit        Your next 10 appointments already scheduled     Apr 16, 2018  9:30 AM CDT   (Arrive by 9:15 AM)   Return Visit with Sandee Waller [facility] [place] ([facility] [place] [facility]    [address] [place] [address]   [phone]            Apr 23, 2018  9:00 AM CDT   (Arrive by 8:45 AM)   Return Visit with [facility] ([facility] [place] )    [address]  [place] [address] [phone]            Apr 30, 2018  9:00 AM CDT   (Arrive by 8:45 AM)   Return Visit with Sandee Waller [facility] [place] ([facility] [place] )    [address] [place] [address] [phone]            May 07, 2018  9:00 AM CDT   (Arrive by 8:45 AM)   Return Visit with Sandee Waller [facility] ([facility] [place] )    [address] [place] [address]  [place] [address] [phone]              Who to contact     If you have questions or need follow up information about today's clinic visit or your schedule please contact [facility] directly at [phone].  Normal or non-critical lab and imaging results will be communicated to you by MyChart, letter or phone within 4 business days after the clinic has received the results. If you do not hear from us within 7 days, please contact the clinic through MyChart or phone. If you have a critical or abnormal lab result, we will notify you by phone as soon as  possible.  Submit refill requests through Nextworth or call your pharmacy and they will forward the refill request to us. Please allow 3 business days for your refill to be completed.          Additional Information About Your Visit        shoplyhart Information     Nextworth gives you secure access to your electronic health record. If you see a primary care provider, you can also send messages to your care team and make appointments. If you have questions, please call your primary care clinic.  If you do not have a primary care provider, please call [phone] and they will assist you.        Care EveryWhere ID     This is your Care EveryWhere ID. This could be used by other organizations to access your [place] medical records  [id number]         Blood Pressure from Last 3 Encounters:   12/27/17 116/78   11/02/17 113/67   06/21/17 102/60    Weight from Last 3 Encounters:   12/27/17 156 lb (70.8 kg)   06/21/17 154 lb (69.9 kg)   05/23/17 155 lb (70.3 kg)              Today, you had the following     No orders found for display       Primary Care Provider Office Phone # Fax #    Nivia Jimenes [phone]       [facility] [place] [address]  [place] [address]        Equal Access to Services     AMARLIIS PUGA : Hadii aad ku hadasho Soomaali, waaxda luqadaha, qaybta kaalmada adeegyada, waxay remain haydianan donovan vargas laaldair . So [facility] [phone].    ATENCIÓN: Si habla español, tiene a ojeda disposición servicios gratuitos de asistencia lingüística. Llame al [phone].    We comply with applicable federal civil rights laws and [place] laws. We do not discriminate on the basis of race, color, national origin, age, disability, sex, sexual orientation, or gender identity.            Thank you!     Thank you for choosing [facility] [place]  for your care. Our goal is always to provide you with excellent care. Hearing back from our patients is one way we can continue to improve our  services. Please take a few minutes to complete the written survey that you may receive in the mail after your visit with us. Thank you!             Your Updated Medication List - Protect others around you: Learn how to safely use, store and throw away your medicines at www.disposemymeds.org.          This list is accurate as of 4/5/18  2:23 PM.  Always use your most recent med list.                   Brand Name Dispense Instructions for use Diagnosis    acyclovir 400 MG tablet    ZOVIRAX    15 tablet    Take 1 tablet (400 mg) by mouth 3 times daily for 5 days        ADVIL PO      Take 400 mg by mouth every 6 hours as needed for moderate pain        hydrOXYzine 25 MG capsule    VISTARIL    60 capsule    Take 1 capsule (25 mg) by mouth 4 times daily as needed for anxiety    Mood swings (H), NICHOLE (generalized anxiety disorder)       prenatal multivitamin plus iron 27-0.8 MG Tabs per tablet     100 tablet    Take 1 tablet by mouth daily    NICHOLE (generalized anxiety disorder), Mood swings (H)       ranitidine 300 MG tablet    ZANTAC    90 tablet    Take 1 tablet (300 mg) by mouth At Bedtime    Gastroesophageal reflux disease without esophagitis       sertraline 100 MG tablet    ZOLOFT    90 tablet    Take 1 tablet (100 mg) by mouth daily    Moderate episode of recurrent major depressive disorder (H), NICHOLE (generalized anxiety disorder)

## 2018-04-06 ASSESSMENT — ANXIETY QUESTIONNAIRES: GAD7 TOTAL SCORE: 12

## 2018-04-06 ASSESSMENT — PATIENT HEALTH QUESTIONNAIRE - PHQ9: SUM OF ALL RESPONSES TO PHQ QUESTIONS 1-9: 10

## 2018-04-16 ENCOUNTER — OFFICE VISIT (OUTPATIENT)
Dept: BEHAVIORAL HEALTH | Facility: OTHER | Age: 28
End: 2018-04-16
Attending: SOCIAL WORKER
Payer: COMMERCIAL

## 2018-04-16 DIAGNOSIS — F41.1 GAD (GENERALIZED ANXIETY DISORDER): ICD-10-CM

## 2018-04-16 DIAGNOSIS — R69 DIAGNOSIS DEFERRED: Primary | ICD-10-CM

## 2018-04-16 DIAGNOSIS — F48.9 DEFERRED DIAGNOSIS ON AXIS I: ICD-10-CM

## 2018-04-16 DIAGNOSIS — F33.1 MAJOR DEPRESSIVE DISORDER, RECURRENT EPISODE, MODERATE (H): ICD-10-CM

## 2018-04-16 PROCEDURE — 90832 PSYTX W PT 30 MINUTES: CPT | Performed by: SOCIAL WORKER

## 2018-04-16 ASSESSMENT — ANXIETY QUESTIONNAIRES
6. BECOMING EASILY ANNOYED OR IRRITABLE: MORE THAN HALF THE DAYS
3. WORRYING TOO MUCH ABOUT DIFFERENT THINGS: MORE THAN HALF THE DAYS
GAD7 TOTAL SCORE: 10
5. BEING SO RESTLESS THAT IT IS HARD TO SIT STILL: SEVERAL DAYS
7. FEELING AFRAID AS IF SOMETHING AWFUL MIGHT HAPPEN: SEVERAL DAYS
2. NOT BEING ABLE TO STOP OR CONTROL WORRYING: MORE THAN HALF THE DAYS
1. FEELING NERVOUS, ANXIOUS, OR ON EDGE: SEVERAL DAYS
IF YOU CHECKED OFF ANY PROBLEMS ON THIS QUESTIONNAIRE, HOW DIFFICULT HAVE THESE PROBLEMS MADE IT FOR YOU TO DO YOUR WORK, TAKE CARE OF THINGS AT HOME, OR GET ALONG WITH OTHER PEOPLE: VERY DIFFICULT

## 2018-04-16 ASSESSMENT — PATIENT HEALTH QUESTIONNAIRE - PHQ9: 5. POOR APPETITE OR OVEREATING: SEVERAL DAYS

## 2018-04-16 NOTE — PROGRESS NOTES
I saw and assessed the patient with SW student. I discussed with the student and agree with the assessment and plan as documented in the students note.    Sandee Waller, MSW, LICSW, [facility]

## 2018-04-16 NOTE — MR AVS SNAPSHOT
After Visit Summary   4/16/2018    Myrna Martinez    MRN: [id number]           Patient Information     Date Of Birth          1990        Visit Information        Provider Department      4/16/2018 9:30 AM Sandee Waller [facility]        Today's Diagnoses     Diagnosis deferred    -  1    NICHOLE (generalized anxiety disorder)        Major depressive disorder, recurrent episode, moderate (H)        Deferred diagnosis on axis I           Follow-ups after your visit        Your next 10 appointments already scheduled     Apr 23, 2018  9:00 AM CDT   (Arrive by 8:45 AM)   [facility] [place] [facility]    [address]  [place] [id number] [phone]            Apr 30, 2018  9:00 AM CDT   (Arrive by 8:45 AM)   Return [facility] [place] [facility]    [address] [place] [address] [phone]            May 07, 2018  9:00 AM CDT   (Arrive by 8:45 AM)   Return Visit with [facility] [place] [facility]    [address] [place] [address] [phone]              Who to contact     If you have questions or need follow up information about today's clinic visit or your schedule please contact [facility] directly at [phone].  Normal or non-critical lab and imaging results will be communicated to you by MyChart, letter or phone within 4 business days after the clinic has received the results. If you do not hear from us within 7 days, please contact the clinic through KupiVIPhart or phone. If you have a critical or abnormal lab result, we will notify you by phone as soon as possible.  Submit refill requests through Socialize or call your pharmacy and they will forward the refill request to us. Please allow 3 business days for your refill to  be completed.          Additional Information About Your Visit        EnhanCVhart Information     Cell-A-Spot gives you secure access to your electronic health record. If you see a primary care provider, you can also send messages to your care team and make appointments. If you have questions, please call your primary care clinic.  If you do not have a primary care provider, please call [phone] and they will assist you.        Care EveryWhere ID     This is your Care EveryWhere ID. This could be used by other organizations to access your [place] medical records  [id number]         Blood Pressure from Last 3 Encounters:   12/27/17 116/78   11/02/17 113/67   06/21/17 102/60    Weight from Last 3 Encounters:   12/27/17 156 lb (70.8 kg)   06/21/17 154 lb (69.9 kg)   05/23/17 155 lb (70.3 kg)              Today, you had the following     No orders found for display       Primary Care Provider Office Phone # Fax #    Nivia Jimenes [phone]       [facility] [place] [address] [address]        Equal Access to Services     [facility]: Hadii aad ku hadasho Soomaali, waaxda luqadaha, qaybta kaalmada aderaven, geraldine valencia . So [facility] [phone].    ATENCIÓN: Si habla español, tiene a ojeda disposición servicios grattwanos de asistencia lingüística. Osbaldo al [phone].    We comply with applicable federal civil rights laws and [place] laws. We do not discriminate on the basis of race, color, national origin, age, disability, sex, sexual orientation, or gender identity.            Thank you!     Thank you for choosing [facility] [place]  for your care. Our goal is always to provide you with excellent care. Hearing back from our patients is one way we can continue to improve our services. Please take a few minutes to complete the written survey that you may receive in the mail after your visit with us. Thank you!             Your Updated Medication  List - Protect others around you: Learn how to safely use, store and throw away your medicines at www.disposemymeds.org.          This list is accurate as of 4/16/18 12:30 PM.  Always use your most recent med list.                   Brand Name Dispense Instructions for use Diagnosis    acyclovir 400 MG tablet    ZOVIRAX    15 tablet    Take 1 tablet (400 mg) by mouth 3 times daily for 5 days        ADVIL PO      Take 400 mg by mouth every 6 hours as needed for moderate pain        hydrOXYzine 25 MG capsule    VISTARIL    60 capsule    Take 1 capsule (25 mg) by mouth 4 times daily as needed for anxiety    Mood swings (H), NICHOLE (generalized anxiety disorder)       prenatal multivitamin plus iron 27-0.8 MG Tabs per tablet     100 tablet    Take 1 tablet by mouth daily    NICHOLE (generalized anxiety disorder), Mood swings (H)       ranitidine 300 MG tablet    ZANTAC    90 tablet    Take 1 tablet (300 mg) by mouth At Bedtime    Gastroesophageal reflux disease without esophagitis       sertraline 100 MG tablet    ZOLOFT    90 tablet    Take 1 tablet (100 mg) by mouth daily    Moderate episode of recurrent major depressive disorder (H), NICHOLE (generalized anxiety disorder)

## 2018-04-16 NOTE — PROGRESS NOTES
[facility]  April 16, 2018    Behavioral Health Clinician Progress Note    Patient Name: Myrna Martinez         Service Type: Individual           Service Location:  Face to Face in Clinic      Session Start Time:  9:30  Session End Time: 10:00      Session Length: 16 - 37      Attendees: Patient    Visit Activities (Refresh list every visit): [facility] Only and MSW Student Intern      Diagnostic Assessment Date: 11/16/17  Treatment Plan Review Date: To be completed in next 2 visits.    Previous PHQ-9:   PHQ-9 11/16/2017 4/5/2018 4/16/2018   Total Score 6 10 9   Q9: Suicide Ideation Not at all Not at all Not at all     Previous NICHOLE-7:   NICHOLE-7 SCORE 11/16/2017 4/5/2018 4/16/2018   Total Score 13 12 10       Suicide Assessment Five-step Evaluation and Treatment (SAFE-T)  RAMO LEVEL:  RAMO Score (Last Two) 11/16/2017 4/5/2018   RAMO Raw Score 30 29   Activation Score 56 52.9   RAMO Level 3 2       DATA  Extended Session (60+ minutes): No  Interactive Complexity: No  Crisis: No  [facility] Patient Yes, addressed the follow [facility] Core Component(s):                          Health and Wellness Promotion    Treatment Objective(s) Addressed in This Session:  Target Behavior(s):  Depressed Mood: Decrease frequency and intensity of feeling down, depressed, hopeless  Identify negative self-talk and behaviors: challenge core beliefs, myths, and actions  Anxiety: will experience a reduction in anxiety, will develop more effective coping skills to manage anxiety symptoms, will develop healthy cognitive patterns and beliefs and will increase ability to function adaptively  Adjustment Difficulties: will develop coping/problem-solving skills to facilitate more adaptive adjustment  Mood Instability: will develop better understanding of triggers and coping strategies to stabilize mood    Current Stressors / Issues:    Patient reports that she has been feeling irritable and anxious lately. She states that she has decided to  "move out of her boyfriend's home and into her own home to work on herself more. She feels that she needs the space and wants to have more control over her environment. She also reports that she feels she is being manipulated by her boyfriend as he is controlling her finances. 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   Pt reports that she is anxious about a possible pregnancy and states that she \"can't do it\" now. She reports wanting to go back to school and developing a career so she is not \"lost\" like her mother.     Pt was amenable to trying Alpha-Stim for her anxiety. She completed a twenty minute treatment at [employer]. She reported a reduction in her anxiety and stated that she felt calm.       Progress on Treatment Objective(s) / Homework:  Minimal progress - PREPARATION (Decided to change - considering how); Intervened by negotiating a change plan and determining options / strategies for behavior change, identifying triggers, exploring social supports, and working towards setting a date to begin behavior change.   Pt is beginning to change and is working toward finding what she wants to accomplish.   Pt referred for couples therapy.    Motivational Interviewing    MI Intervention: Expressed Empathy/Understanding, Supported Autonomy, Collaboration, Evocation and Open-ended questions     Change Talk Expressed by the Patient: Desire to change Ability to change Reasons to change Taking steps    Provider Response to Change Talk: E - Evoked more info from patient about behavior change, A - Affirmed patient's thoughts, decisions, or attempts at behavior change and R - Reflected patient's change talk      SOLUTION FOCUSED: Explored patterns in patient's relationships and discussed options for new behaviors, Explored patterns in patient's actions and choices and discussed options " for new behaviors  NARRATIVE THERAPY: Allowed patient to express themselves and their feelings in conversational mannor    Care Plan review completed: No    Medication Review:  No current psychiatric medications prescribed.      Medication Compliance:  NA    Changes in Health Issues:   None reported    Chemical Use Review:   Substance Use: Not reviewed this session     Tobacco Use: Not reviewed this session.    Assessment: Current Emotional / Mental Status (status of significant symptoms):    Risk status (Self / Other harm or suicidal ideation)  Patient denies current fears or concerns for personal safety.  Patient denies current or recent suicidal ideation or behaviors.  Patient denies current or recent homicidal ideation or behaviors.  Patient denies current or recent self injurious behavior or ideation.  Patient denies other safety concerns.  A safety and risk management plan has not been developed at this time, however patient was encouraged to call [place] [phone] should there be a change in any of these risk factors.    Appearance:   Appropriate   Eye Contact:   Good   Psychomotor Behavior: Normal   Attitude:   Cooperative   Orientation:   All  Speech   Rate / Production: Normal    Volume:  Normal   Mood:    Irritable   Affect:    Appropriate   Thought Content:  Clear   Thought Form:  Coherent  Logical   Insight:    Good     Diagnoses:  1. Diagnosis deferred    2. NICHOLE (generalized anxiety disorder)    3. Major depressive disorder, recurrent episode, moderate (H)    4. Deferred diagnosis on axis I        Collateral Reports Completed:  Not Applicable    Plan: (Homework, other):  Patient was given information about behavioral services and encouraged to schedule a follow up appointment with the clinic [facility] in 1 week, pt was also referred to couples therapy.  CD Recommendations: Maintain Sobriety.      VINNY Dave Student Intern

## 2018-04-17 ASSESSMENT — ANXIETY QUESTIONNAIRES: GAD7 TOTAL SCORE: 10

## 2018-04-17 ASSESSMENT — PATIENT HEALTH QUESTIONNAIRE - PHQ9: SUM OF ALL RESPONSES TO PHQ QUESTIONS 1-9: 9

## 2018-04-23 ENCOUNTER — OFFICE VISIT (OUTPATIENT)
Dept: BEHAVIORAL HEALTH | Facility: OTHER | Age: 28
End: 2018-04-23
Attending: SOCIAL WORKER
Payer: COMMERCIAL

## 2018-04-23 DIAGNOSIS — F33.1 MAJOR DEPRESSIVE DISORDER, RECURRENT EPISODE, MODERATE (H): ICD-10-CM

## 2018-04-23 DIAGNOSIS — F41.1 GAD (GENERALIZED ANXIETY DISORDER): Primary | ICD-10-CM

## 2018-04-23 PROCEDURE — 90832 PSYTX W PT 30 MINUTES: CPT | Mod: HN | Performed by: SOCIAL WORKER

## 2018-04-23 PROCEDURE — 90832 PSYTX W PT 30 MINUTES: CPT | Performed by: SOCIAL WORKER

## 2018-04-23 ASSESSMENT — ANXIETY QUESTIONNAIRES
GAD7 TOTAL SCORE: 9
6. BECOMING EASILY ANNOYED OR IRRITABLE: MORE THAN HALF THE DAYS
3. WORRYING TOO MUCH ABOUT DIFFERENT THINGS: MORE THAN HALF THE DAYS
5. BEING SO RESTLESS THAT IT IS HARD TO SIT STILL: NOT AT ALL
7. FEELING AFRAID AS IF SOMETHING AWFUL MIGHT HAPPEN: SEVERAL DAYS
2. NOT BEING ABLE TO STOP OR CONTROL WORRYING: MORE THAN HALF THE DAYS
1. FEELING NERVOUS, ANXIOUS, OR ON EDGE: MORE THAN HALF THE DAYS
IF YOU CHECKED OFF ANY PROBLEMS ON THIS QUESTIONNAIRE, HOW DIFFICULT HAVE THESE PROBLEMS MADE IT FOR YOU TO DO YOUR WORK, TAKE CARE OF THINGS AT HOME, OR GET ALONG WITH OTHER PEOPLE: VERY DIFFICULT

## 2018-04-23 ASSESSMENT — PATIENT HEALTH QUESTIONNAIRE - PHQ9: 5. POOR APPETITE OR OVEREATING: NOT AT ALL

## 2018-04-23 NOTE — MR AVS SNAPSHOT
After Visit Summary   4/23/2018    Myrna Martinez    MRN: [id number]           Patient Information     Date Of Birth          1990        Visit Information        Provider Department      4/23/2018 9:00 AM Sandee Waller [facility]        Today's Diagnoses     NICHOLE (generalized anxiety disorder)    -  1    Major depressive disorder, recurrent episode, moderate (H)           Follow-ups after your visit        Your next 10 appointments already scheduled     Apr 30, 2018  9:00 AM CDT   (Arrive by 8:45 AM)   Return Visit with Sandee Waller [facility] [place] ([facility] )    [address] [place] [address] [phone]            May 07, 2018  9:00 AM CDT   (Arrive by 8:45 AM)   Return Visit with Sandee Waller [facility] [place] ([facility] )    [address] [place], [place] [address] [place] [address] [phone]              Who to contact     If you have questions or need follow up information about today's clinic visit or your schedule please contact [facility] directly at [phone].  Normal or non-critical lab and imaging results will be communicated to you by MyChart, letter or phone within 4 business days after the clinic has received the results. If you do not hear from us within 7 days, please contact the clinic through [employer] or phone. If you have a critical or abnormal lab result, we will notify you by phone as soon as possible.  Submit refill requests through [employer] or call your pharmacy and they will forward the refill request to us. Please allow 3 business days for your refill to be completed.          Additional Information About Your Visit        MyChart Information     [employer] gives you secure access to your electronic health record. If you see a primary care provider, you can also send messages to your care team and make appointments. If you have questions,  please call your primary care clinic.  If you do not have a primary care provider, please call [phone] and they will assist you.        Care EveryWhere ID     This is your Care EveryWhere ID. This could be used by other organizations to access your [facility] medical records  [id number]         Blood Pressure from Last 3 Encounters:   12/27/17 116/78   11/02/17 113/67   06/21/17 102/60    Weight from Last 3 Encounters:   12/27/17 156 lb (70.8 kg)   06/21/17 154 lb (69.9 kg)   05/23/17 155 lb (70.3 kg)              Today, you had the following     No orders found for display       Primary Care Provider Office Phone # Fax #    Niiva Jimenes [phone]       [facility] [place] [address]  [place] [address]        Equal Access to Services     [facility]: Hadii aad ku hadasho Soomaali, waaxda luqadaha, qaybta kaalmada adeegyada, waxay idiin hayaan adeeg kharaarthur valencia . So [facility] [phone].    ATENCIÓN: Si habla español, tiene a ojeda disposición servicios gratuitos de asistencia lingüística. Llame al [phone].    We comply with applicable federal civil rights laws and [place] laws. We do not discriminate on the basis of race, color, national origin, age, disability, sex, sexual orientation, or gender identity.            Thank you!     Thank you for choosing [facility] [place]  for your care. Our goal is always to provide you with excellent care. Hearing back from our patients is one way we can continue to improve our services. Please take a few minutes to complete the written survey that you may receive in the mail after your visit with us. Thank you!             Your Updated Medication List - Protect others around you: Learn how to safely use, store and throw away your medicines at www.disposemymeds.org.          This list is accurate as of 4/23/18 11:10 AM.  Always use your most recent med list.                   Brand Name Dispense Instructions for use Diagnosis     acyclovir 400 MG tablet    ZOVIRAX    15 tablet    Take 1 tablet (400 mg) by mouth 3 times daily for 5 days        ADVIL PO      Take 400 mg by mouth every 6 hours as needed for moderate pain        hydrOXYzine 25 MG capsule    VISTARIL    60 capsule    Take 1 capsule (25 mg) by mouth 4 times daily as needed for anxiety    Mood swings (H), NICHOLE (generalized anxiety disorder)       prenatal multivitamin plus iron 27-0.8 MG Tabs per tablet     100 tablet    Take 1 tablet by mouth daily    NICHOLE (generalized anxiety disorder), Mood swings (H)       ranitidine 300 MG tablet    ZANTAC    90 tablet    Take 1 tablet (300 mg) by mouth At Bedtime    Gastroesophageal reflux disease without esophagitis       sertraline 100 MG tablet    ZOLOFT    90 tablet    Take 1 tablet (100 mg) by mouth daily    Moderate episode of recurrent major depressive disorder (H), NICHOLE (generalized anxiety disorder)

## 2018-04-23 NOTE — PROGRESS NOTES
I saw and assessed the patient with SW student. I discussed with the student and agree with the assessment and plan as documented in the students note.    Sandee Waller, MSW, LICSW, [facility]

## 2018-04-23 NOTE — PROGRESS NOTES
[facility]  April 23, 2018    Behavioral Health Clinician Progress Note    Patient Name: Myrna Martinez         Service Type: Individual           Service Location:  Face to Face in Clinic      Session Start Time:  9:00 Session End Time: 9:30      Session Length: 16 - 37      Attendees: Patient    Visit Activities (Refresh list every visit): [facility] Only and MSW Student Intern      Diagnostic Assessment Date: 11/16/17  Treatment Plan Review Date: To be completed in next visits.    Previous PHQ-9:   PHQ-9 4/5/2018 4/16/2018 4/23/2018   Total Score 10 9 5   Q9: Suicide Ideation Not at all Not at all Not at all     Previous NICHOLE-7:   NICHOLE-7 SCORE 4/5/2018 4/16/2018 4/23/2018   Total Score 12 10 9       Suicide Assessment Five-step Evaluation and Treatment (SAFE-T)  ARMO LEVEL:  RAMO Score (Last Two) 11/16/2017 4/5/2018   RAMO Raw Score 30 29   Activation Score 56 52.9   RAMO Level 3 2       DATA  Extended Session (60+ minutes): No  Interactive Complexity: No  Crisis: No  [facility] Patient Yes, addressed the follow [facility] Core Component(s):                          Health and Wellness Promotion    Treatment Objective(s) Addressed in This Session:  Target Behavior(s):  Depressed Mood: Increase interest, engagement, and pleasure in doing things  Decrease frequency and intensity of feeling down, depressed, hopeless  Identify negative self-talk and behaviors: challenge core beliefs, myths, and actions  Improve concentration, focus, and mindfulness in daily activities   Anxiety: will experience a reduction in anxiety, will develop more effective coping skills to manage anxiety symptoms, will develop healthy cognitive patterns and beliefs and will increase ability to function adaptively  Adjustment Difficulties: will develop coping/problem-solving skills to facilitate more adaptive adjustment  Mood Instability: will develop better understanding of triggers and coping strategies to stabilize mood    Current Stressors  / Issues:    Patient reports that she has been feeling less depressed and anxious lately. She states that things have improved since she made the decision to move out of her boyfriend's home and gain independence. She states that she believes he is starting to see the value in her wanting to improve herself and become an independent woman. She states that he has been agreeing to help her financially right now as she has not made much money at her job. She is planning to start school and feels determined to give her best effort.    Pt also stressed about her birth control at this time. She states that she is not trying to get pregnant at this time, and an unexpected pregnancy would leave her feeling very conflicted.    Pt has been using the gym and she has been spending time outdoors and riding four wheelers to cope with anxiety.         Progress on Treatment Objective(s) / Homework:  Minimal progress - ACTION (Actively working towards change); Intervened by reinforcing change plan / affirming steps taken.   Pt has taken steps toward applying for and furnishing her own apartment. She has appointments set with support staff at the [facility] to get signed up for classes.     Motivational Interviewing    MI Intervention: Expressed Empathy/Understanding, Supported Autonomy, Collaboration, Evocation, Open-ended questions and Reflections: simple and complex     Change Talk Expressed by the Patient: Desire to change Ability to change Reasons to change Committment to change Activation Taking steps    Provider Response to Change Talk: E - Evoked more info from patient about behavior change, A - Affirmed patient's thoughts, decisions, or attempts at behavior change, R - Reflected patient's change talk and S - Summarized patient's change talk statements      SOLUTION FOCUSED: Explored patterns in patient's relationships and discussed options for new behaviors, Explored patterns in patient's actions and choices and discussed  options for new behaviors  NARRATIVE THERAPY: Allowed patient to express themselves and their feelings in conversational mannor    Care Plan review completed: No    Medication Review:  No current psychiatric medications prescribed.      Medication Compliance:  NA    Changes in Health Issues:   None reported    Chemical Use Review:   Substance Use: Not reviewed this session     Tobacco Use: Not reviewed this session.    Assessment: Current Emotional / Mental Status (status of significant symptoms):    Risk status (Self / Other harm or suicidal ideation)  Patient denies current fears or concerns for personal safety.  Patient denies current or recent suicidal ideation or behaviors.  Patient denies current or recent homicidal ideation or behaviors.  Patient denies current or recent self injurious behavior or ideation.  Patient denies other safety concerns.  A safety and risk management plan has not been developed at this time, however patient was encouraged to call [place] [phone] should there be a change in any of these risk factors.    Appearance:   Appropriate   Eye Contact:   Good   Psychomotor Behavior: Normal   Attitude:   Cooperative   Orientation:   All  Speech   Rate / Production: Normal    Volume:  Normal   Mood:    Irritable   Affect:    Appropriate   Thought Content:  Clear   Thought Form:  Coherent  Logical   Insight:    Good     Diagnoses:  1. NICHOLE (generalized anxiety disorder)    2. Major depressive disorder, recurrent episode, moderate (H)        Collateral Reports Completed:  Not Applicable    Plan: (Homework, other):  Patient was given information about deep breathing techniques, behavioral services and encouraged to schedule a follow up appointment with the clinic [facility] in 1 week. Patient was also given resources for support at the [facility].   CD Recommendations: Maintain Sobriety.      VINNY Dave Student Intern

## 2018-04-24 ASSESSMENT — ANXIETY QUESTIONNAIRES: GAD7 TOTAL SCORE: 9

## 2018-04-24 ASSESSMENT — PATIENT HEALTH QUESTIONNAIRE - PHQ9: SUM OF ALL RESPONSES TO PHQ QUESTIONS 1-9: 5

## 2018-05-07 ENCOUNTER — TELEPHONE (OUTPATIENT)
Dept: BEHAVIORAL HEALTH | Facility: OTHER | Age: 28
End: 2018-05-07

## 2018-05-07 NOTE — TELEPHONE ENCOUNTER
Behavioral Health Home Services  @FLOW([id number])@      Social Work Care Navigator Note      Patient: Myrna Martinez  Date: May 7, 2018  Preferred Name: Myrna    Previous PHQ-9:   PHQ-9 SCORE 4/5/2018 4/16/2018 4/23/2018   Total Score 10 9 5     Previous NICHOLE-7:   NICHOLE-7 SCORE 4/5/2018 4/16/2018 4/23/2018   Total Score 12 10 9     RAMO LEVEL:  RAMO Score (Last Two) 11/16/2017 4/5/2018   RAOM Raw Score 30 29   Activation Score 56 52.9   RAMO Level 3 2       Preferred Contact: @MARION([id number])@  Type of Contact Today: Phone call (not reached/unavailable)      Data: (subjective / Objective):  Patient Goals Areas: @FLOW([id number])@  Patient Stated Goals: @FLOW([id number])@  Recent ED/IP Admission or Discharge?   None  Attempted to reach patient, but was unsuccessful.  Plan to attempt again.  Ava Echols        Next 5 appointments (look out 90 days)     May 10, 2018 11:15 AM CDT   (Arrive by 11:00 AM)   Office Visit with Teresa Dawson MD   [facility] [place] ([facility] [place] )    [address]  [place] [address]   [phone]            May 14, 2018 11:00 AM CDT   (Arrive by 10:45 AM)   Office Visit with EVELIO Pinzon CNM   [facility] [place] ([facility] [place] )    [address]  [place] [address]   [phone]

## 2018-05-10 ENCOUNTER — OFFICE VISIT (OUTPATIENT)
Dept: FAMILY MEDICINE | Facility: OTHER | Age: 28
End: 2018-05-10
Attending: FAMILY MEDICINE
Payer: COMMERCIAL

## 2018-05-10 VITALS
BODY MASS INDEX: 28.45 KG/M2 | SYSTOLIC BLOOD PRESSURE: 128 MMHG | HEART RATE: 76 BPM | DIASTOLIC BLOOD PRESSURE: 76 MMHG | OXYGEN SATURATION: 99 % | TEMPERATURE: 98.6 F | HEIGHT: 62 IN | WEIGHT: 154.6 LBS | RESPIRATION RATE: 16 BRPM

## 2018-05-10 DIAGNOSIS — F33.9 EPISODE OF RECURRENT MAJOR DEPRESSIVE DISORDER, UNSPECIFIED DEPRESSION EPISODE SEVERITY (H): ICD-10-CM

## 2018-05-10 DIAGNOSIS — F41.1 GAD (GENERALIZED ANXIETY DISORDER): Primary | ICD-10-CM

## 2018-05-10 PROCEDURE — 99213 OFFICE O/P EST LOW 20 MIN: CPT | Performed by: FAMILY MEDICINE

## 2018-05-10 PROCEDURE — G0463 HOSPITAL OUTPT CLINIC VISIT: HCPCS

## 2018-05-10 RX ORDER — HYDROXYZINE PAMOATE 25 MG/1
25-50 CAPSULE ORAL 3 TIMES DAILY PRN
Qty: 30 CAPSULE | Refills: 1 | Status: SHIPPED | OUTPATIENT
Start: 2018-05-10 | End: 2018-08-08

## 2018-05-10 ASSESSMENT — ANXIETY QUESTIONNAIRES
5. BEING SO RESTLESS THAT IT IS HARD TO SIT STILL: NOT AT ALL
3. WORRYING TOO MUCH ABOUT DIFFERENT THINGS: SEVERAL DAYS
6. BECOMING EASILY ANNOYED OR IRRITABLE: NOT AT ALL
7. FEELING AFRAID AS IF SOMETHING AWFUL MIGHT HAPPEN: SEVERAL DAYS
2. NOT BEING ABLE TO STOP OR CONTROL WORRYING: SEVERAL DAYS
GAD7 TOTAL SCORE: 5
IF YOU CHECKED OFF ANY PROBLEMS ON THIS QUESTIONNAIRE, HOW DIFFICULT HAVE THESE PROBLEMS MADE IT FOR YOU TO DO YOUR WORK, TAKE CARE OF THINGS AT HOME, OR GET ALONG WITH OTHER PEOPLE: SOMEWHAT DIFFICULT
1. FEELING NERVOUS, ANXIOUS, OR ON EDGE: SEVERAL DAYS
4. TROUBLE RELAXING: SEVERAL DAYS

## 2018-05-10 ASSESSMENT — PAIN SCALES - GENERAL: PAINLEVEL: NO PAIN (0)

## 2018-05-10 NOTE — NURSING NOTE
"Chief Complaint   Patient presents with     Establish Care       Initial /76  Pulse 76  Temp 98.6  F (37  C) (Tympanic)  Resp 16  Ht 5' 2\" (1.575 m)  Wt 154 lb 9.6 oz (70.1 kg)  SpO2 99%  BMI 28.28 kg/m2 Estimated body mass index is 28.28 kg/(m^2) as calculated from the following:    Height as of this encounter: 5' 2\" (1.575 m).    Weight as of this encounter: 154 lb 9.6 oz (70.1 kg).  Medication Reconciliation: complete    Lashae Vergara LPN    "

## 2018-05-10 NOTE — LETTER
My Depression Action Plan  Name: Myrna Martinez   Date of Birth 1990  Date: 5/10/2018    My doctor: Nivia Jimenes   My clinic: [facility] [place]  [place] [address]  [phone]          GREEN    ZONE   Good Control    What it looks like:     Things are going generally well. You have normal up s and down s. You may even feel depressed from time to time, but bad moods usually last less than a day.   What you need to do:  1. Continue to care for yourself (see self care plan)  2. Check your depression survival kit and update it as needed  3. Follow your physician s recommendations including any medication.  4. Do not stop taking medication unless you consult with your physician first.           YELLOW         ZONE Getting Worse    What it looks like:     Depression is starting to interfere with your life.     It may be hard to get out of bed; you may be starting to isolate yourself from others.    Symptoms of depression are starting to last most all day and this has happened for several days.     You may have suicidal thoughts but they are not constant.   What you need to do:     1. Call your care team, your response to treatment will improve if you keep your care team informed of your progress. Yellow periods are signs an adjustment may need to be made.     2. Continue your self-care, even if you have to fake it!    3. Talk to someone in your support network    4. Open up your depression survival kit           RED    ZONE Medical Alert - Get Help    What it looks like:     Depression is seriously interfering with your life.     You may experience these or other symptoms: You can t get out of bed most days, can t work or engage in other necessary activities, you have trouble taking care of basic hygiene, or basic responsibilities, thoughts of suicide or death that will not go away, self-injurious behavior.     What you need to do:  1. Call your care team and request a  same-day appointment. If they are not available (weekends or after hours) call your local crisis line, emergency room or 911.            Depression Self Care Plan / Survival Kit    Self-Care for Depression  Here s the deal. Your body and mind are really not as separate as most people think.  What you do and think affects how you feel and how you feel influences what you do and think. This means if you do things that people who feel good do, it will help you feel better.  Sometimes this is all it takes.  There is also a place for medication and therapy depending on how severe your depression is, so be sure to consult with your medical provider and/ or Behavioral Health Consultant if your symptoms are worsening or not improving.     In order to better manage my stress, I will:    Exercise  Get some form of exercise, every day. This will help reduce pain and release endorphins, the  feel good  chemicals in your brain. This is almost as good as taking antidepressants!  This is not the same as joining a gym and then never going! (they count on that by the way ) It can be as simple as just going for a walk or doing some gardening, anything that will get you moving.      Hygiene   Maintain good hygiene (Get out of bed in the morning, Make your bed, Brush your teeth, Take a shower, and Get dressed like you were going to work, even if you are unemployed).  If your clothes don't fit try to get ones that do.    Diet  I will strive to eat foods that are good for me, drink plenty of water, and avoid excessive sugar, caffeine, alcohol, and other mood-altering substances.  Some foods that are helpful in depression are: complex carbohydrates, B vitamins, flaxseed, fish or fish oil, fresh fruits and vegetables.    Psychotherapy  I agree to participate in Individual Therapy (if recommended).    Medication  If prescribed medications, I agree to take them.  Missing doses can result in serious side effects.  I understand that drinking  alcohol, or other illicit drug use, may cause potential side effects.  I will not stop my medication abruptly without first discussing it with my provider.    Staying Connected With Others  I will stay in touch with my friends, family members, and my primary care provider/team.    Use your imagination  Be creative.  We all have a creative side; it doesn t matter if it s oil painting, sand castles, or mud pies! This will also kick up the endorphins.    Witness Beauty  (AKA stop and smell the roses) Take a look outside, even in mid-winter. Notice colors, textures. Watch the squirrels and birds.     Service to others  Be of service to others.  There is always someone else in need.  By helping others we can  get out of ourselves  and remember the really important things.  This also provides opportunities for practicing all the other parts of the program.    Humor  Laugh and be silly!  Adjust your TV habits for less news and crime-drama and more comedy.    Control your stress  Try breathing deep, massage therapy, biofeedback, and meditation. Find time to relax each day.     My support system    Clinic Contact:  Phone number:    Contact 1:  Phone number:    Contact 2:  Phone number:    [employer]/:  Phone number:    Therapist:  Phone number:    Local crisis center:    Phone number:    Other community support:  Phone number:

## 2018-05-10 NOTE — LETTER
My Depression Action Plan  Name: yMrna Martinez   Date of Birth 1990  Date: 5/10/2018    My doctor: Nivia Jimenes   My clinic: [facility] [place]  [place] [address]  [phone]          GREEN    ZONE   Good Control    What it looks like:     Things are going generally well. You have normal up s and down s. You may even feel depressed from time to time, but bad moods usually last less than a day.   What you need to do:  1. Continue to care for yourself (see self care plan)  2. Check your depression survival kit and update it as needed  3. Follow your physician s recommendations including any medication.  4. Do not stop taking medication unless you consult with your physician first.           YELLOW         ZONE Getting Worse    What it looks like:     Depression is starting to interfere with your life.     It may be hard to get out of bed; you may be starting to isolate yourself from others.    Symptoms of depression are starting to last most all day and this has happened for several days.     You may have suicidal thoughts but they are not constant.   What you need to do:     1. Call your care team, your response to treatment will improve if you keep your care team informed of your progress. Yellow periods are signs an adjustment may need to be made.     2. Continue your self-care, even if you have to fake it!    3. Talk to someone in your support network    4. Open up your depression survival kit           RED    ZONE Medical Alert - Get Help    What it looks like:     Depression is seriously interfering with your life.     You may experience these or other symptoms: You can t get out of bed most days, can t work or engage in other necessary activities, you have trouble taking care of basic hygiene, or basic responsibilities, thoughts of suicide or death that will not go away, self-injurious behavior.     What you need to do:  1. Call your care team and request a  same-day appointment. If they are not available (weekends or after hours) call your local crisis line, emergency room or 911.            Depression Self Care Plan / Survival Kit    Self-Care for Depression  Here s the deal. Your body and mind are really not as separate as most people think.  What you do and think affects how you feel and how you feel influences what you do and think. This means if you do things that people who feel good do, it will help you feel better.  Sometimes this is all it takes.  There is also a place for medication and therapy depending on how severe your depression is, so be sure to consult with your medical provider and/ or Behavioral Health Consultant if your symptoms are worsening or not improving.     In order to better manage my stress, I will:    Exercise  Get some form of exercise, every day. This will help reduce pain and release endorphins, the  feel good  chemicals in your brain. This is almost as good as taking antidepressants!  This is not the same as joining a gym and then never going! (they count on that by the way ) It can be as simple as just going for a walk or doing some gardening, anything that will get you moving.      Hygiene   Maintain good hygiene (Get out of bed in the morning, Make your bed, Brush your teeth, Take a shower, and Get dressed like you were going to work, even if you are unemployed).  If your clothes don't fit try to get ones that do.    Diet  I will strive to eat foods that are good for me, drink plenty of water, and avoid excessive sugar, caffeine, alcohol, and other mood-altering substances.  Some foods that are helpful in depression are: complex carbohydrates, B vitamins, flaxseed, fish or fish oil, fresh fruits and vegetables.    Psychotherapy  I agree to participate in Individual Therapy (if recommended).    Medication  If prescribed medications, I agree to take them.  Missing doses can result in serious side effects.  I understand that drinking  alcohol, or other illicit drug use, may cause potential side effects.  I will not stop my medication abruptly without first discussing it with my provider.    Staying Connected With Others  I will stay in touch with my friends, family members, and my primary care provider/team.    Use your imagination  Be creative.  We all have a creative side; it doesn t matter if it s oil painting, sand castles, or mud pies! This will also kick up the endorphins.    Witness Beauty  (AKA stop and smell the roses) Take a look outside, even in mid-winter. Notice colors, textures. Watch the squirrels and birds.     Service to others  Be of service to others.  There is always someone else in need.  By helping others we can  get out of ourselves  and remember the really important things.  This also provides opportunities for practicing all the other parts of the program.    Humor  Laugh and be silly!  Adjust your TV habits for less news and crime-drama and more comedy.    Control your stress  Try breathing deep, massage therapy, biofeedback, and meditation. Find time to relax each day.     My support system    Clinic Contact:  Phone number:    Contact 1:  Phone number:    Contact 2:  Phone number:    [employer]/:  Phone number:    Therapist:  Phone number:    Local crisis center:    Phone number:    Other community support:  Phone number:

## 2018-05-10 NOTE — PATIENT INSTRUCTIONS
Restart Vistaril as needed for anxiety/panic/insomnia.  Consider restarting daily Zoloft or other SSRI.  Continue with positive life changes.  Continue counseling with Marianela

## 2018-05-10 NOTE — PROGRESS NOTES
SUBJECTIVE:   Myrna Martinez is a 27 year old female who presents to clinic today for the following health issues:      New Patient/Transfer of Care      Abnormal Mood Symptoms      Duration: ongoing; sees Sandee Waller (no showed last 2 visits)    Description:  Depression: YES - in the past, but denies feeling down now  Anxiety: YES - excessive stress, leaving father of her 5 1/2 year old son, living with mom, who just had hip surgery, on wait list for place of her own, worrying about finances  Panic attacks: no      Accompanying signs and symptoms: see PHQ-9 and NICHOLE scores    History (similar episodes/previous evaluation): in the past; yes - prior Prozac and Zoloft    Prefers to avoid daily medication - states she never takes them regularly    Would like to try Vistaril as needed - used this in the past      Has appointment next week for Implanon with GYN.    Problem list and histories reviewed & adjusted, as indicated.  Additional history: as documented    Current Outpatient Prescriptions   Medication     hydrOXYzine (VISTARIL) 25 MG capsule     Ibuprofen (ADVIL PO)     acyclovir (ZOVIRAX) 400 MG tablet     [DISCONTINUED] ValACYclovir HCl (VALTREX PO)     No current facility-administered medications for this visit.        Patient Active Problem List   Diagnosis     Major depressive disorder, recurrent episode, moderate (H)     Herpes genitalis in women     ACP (advance care planning)     NICHOLE (generalized anxiety disorder)     Family planning     Past Surgical History:   Procedure Laterality Date     APPENDECTOMY       MAMMOPLASTY REDUCTION  2011       Social History   Substance Use Topics     Smoking status: Never Smoker     Smokeless tobacco: Never Used     Alcohol use 0.0 oz/week     0 Standard drinks or equivalent per week      Comment: 2x/month     Family History   Problem Relation Age of Onset     Depression Mother      Hypertension Mother      Migraines Mother      no longer     Depression Father       "Bipolar Disorder Maternal Grandmother      Depression Maternal Grandmother      Hypertension Maternal Grandmother      Bipolar Disorder Paternal Grandmother      Migraines Brother            Reviewed and updated as needed this visit by clinical staff  Tobacco  Allergies  Meds  Med Hx  Surg Hx  Fam Hx  Soc Hx      Reviewed and updated as needed this visit by Provider         ROS:  Constitutional, HEENT, cardiovascular, pulmonary, gi and gu systems are negative, except as otherwise noted.    OBJECTIVE:     /76  Pulse 76  Temp 98.6  F (37  C) (Tympanic)  Resp 16  Ht 5' 2\" (1.575 m)  Wt 154 lb 9.6 oz (70.1 kg)  SpO2 99%  BMI 28.28 kg/m2  Body mass index is 28.28 kg/(m^2).  GENERAL: healthy, alert and no distress  RESP: lungs clear to auscultation - no rales, rhonchi or wheezes  CV: regular rate and rhythm, normal S1 S2, no S3 or S4, no murmur, click or rub, no peripheral edema and peripheral pulses strong  PSYCH: mentation appears normal and tearful      ASSESSMENT/PLAN:     (F41.1) NICHOLE (generalized anxiety disorder)  (primary encounter diagnosis)  Plan: hydrOXYzine (VISTARIL) 25 MG capsule      (F33.9) Episode of recurrent major depressive disorder, unspecified depression episode severity (H)    Patient Instructions   Restart Vistaril as needed for anxiety/panic/insomnia.  Consider restarting daily Zoloft or other SSRI.  Continue with positive life changes.  Continue counseling with Sandee.                  Teresa Song MD  [facility] [place]  "

## 2018-05-10 NOTE — MR AVS SNAPSHOT
After Visit Summary   5/10/2018    yMrna Martinez    MRN: [id number]           Patient Information     Date Of Birth          1990        Visit Information        Provider Department      5/10/2018 11:15 AM Teresa Dawson MD [facility]        Today's Diagnoses     NICHOLE (generalized anxiety disorder)    -  1    Episode of recurrent major depressive disorder, unspecified depression episode severity (H)          Care Instructions    Restart Vistaril as needed for anxiety/panic/insomnia.  Consider restarting daily Zoloft or other SSRI.  Continue with positive life changes.  Continue counseling with Sandee.            Follow-ups after your visit        Your next 10 appointments already scheduled     May 14, 2018 11:00 AM CDT   (Arrive by 10:45 AM)   Office Visit with EVELIO Pinzon CNM   [facility] ([facility] )    [address]  [place] [address]   [phone]           Bring a current list of meds and any records pertaining to this visit. For Physicals, please bring immunization records and any forms needing to be filled out. Please arrive 10 minutes early to complete paperwork.              Who to contact     If you have questions or need follow up information about today's clinic visit or your schedule please contact [facility] directly at [phone].  Normal or non-critical lab and imaging results will be communicated to you by MyChart, letter or phone within 4 business days after the clinic has received the results. If you do not hear from us within 7 days, please contact the clinic through MyChart or phone. If you have a critical or abnormal lab result, we will notify you by phone as soon as possible.  Submit refill requests through ipvive or call your pharmacy and they will forward the refill request to us. Please allow 3 business days for your refill to be completed.          Additional Information About Your  "Visit        [employer] Information     [employer] gives you secure access to your electronic health record. If you see a primary care provider, you can also send messages to your care team and make appointments. If you have questions, please call your primary care clinic.  If you do not have a primary care provider, please call [phone] and they will assist you.        Care EveryWhere ID     This is your Care EveryWhere ID. This could be used by other organizations to access your [place] medical records  [id number]        Your Vitals Were     Pulse Temperature Respirations Height Pulse Oximetry BMI (Body Mass Index)    76 98.6  F (37  C) (Tympanic) 16 5' 2\" (1.575 m) 99% 28.28 kg/m2       Blood Pressure from Last 3 Encounters:   05/10/18 128/76   12/27/17 116/78   11/02/17 113/67    Weight from Last 3 Encounters:   05/10/18 154 lb 9.6 oz (70.1 kg)   12/27/17 156 lb (70.8 kg)   06/21/17 154 lb (69.9 kg)              Today, you had the following     No orders found for display         Today's Medication Changes          These changes are accurate as of 5/10/18 11:48 AM.  If you have any questions, ask your nurse or doctor.               Start taking these medicines.        Dose/Directions    hydrOXYzine 25 MG capsule   Commonly known as:  VISTARIL   Used for:  NICHOLE (generalized anxiety disorder)   Started by:  Teresa Dawson MD        Dose:  25-50 mg   Take 1-2 capsules (25-50 mg) by mouth 3 times daily as needed for anxiety or other (insomnia)   Quantity:  30 capsule   Refills:  1            Where to get your medicines      These medications were sent to [id number] - [place]EREZ - [address] AT [facility] [address], [place] [address]     Phone:  [phone]     hydrOXYzine 25 MG capsule                Primary Care Provider Office Phone # Fax #    Nivia Jimenes [phone]       [facility] [place] [address] [place] [address]        Equal " Access to Services     [facility]: Hadii aad ku hadlolitaaashish Watkins, waangelida luqstuartha, qamadhavi emmydanneillegeraldine larson. So [facility] [phone].    ATENCIÓN: Si habla español, tiene a ojeda disposición servicios gratuitos de asistencia lingüística. Llame al [phone].    We comply with applicable federal civil rights laws and [place] laws. We do not discriminate on the basis of race, color, national origin, age, disability, sex, sexual orientation, or gender identity.            Thank you!     [facility] you for [facility]  for your [facility]. Our goal is always to provide you with excellent care. Hearing back from our patients is one way we can continue to improve our services. Please take a few minutes to complete the written survey that you may receive in the mail after your visit with us. Thank you!             Your Updated Medication List - Protect others around you: Learn how to safely use, store and throw away your medicines at www.disposemymeds.org.          This list is accurate as of 5/10/18 11:48 AM.  Always use your most recent med list.                   Brand Name Dispense Instructions for use Diagnosis    acyclovir 400 MG tablet    ZOVIRAX    15 tablet    Take 1 tablet (400 mg) by mouth 3 times daily for 5 days        ADVIL PO      Take 400 mg by mouth every 6 hours as needed for moderate pain        hydrOXYzine 25 MG capsule    VISTARIL    30 capsule    Take 1-2 capsules (25-50 mg) by mouth 3 times daily as needed for anxiety or other (insomnia)    NICHOLE (generalized anxiety disorder)

## 2018-05-11 ASSESSMENT — ANXIETY QUESTIONNAIRES: GAD7 TOTAL SCORE: 5

## 2018-05-11 ASSESSMENT — PATIENT HEALTH QUESTIONNAIRE - PHQ9: SUM OF ALL RESPONSES TO PHQ QUESTIONS 1-9: 8

## 2018-05-14 ENCOUNTER — OFFICE VISIT (OUTPATIENT)
Dept: OBGYN | Facility: OTHER | Age: 28
End: 2018-05-14
Attending: ADVANCED PRACTICE MIDWIFE
Payer: COMMERCIAL

## 2018-05-14 VITALS
DIASTOLIC BLOOD PRESSURE: 68 MMHG | OXYGEN SATURATION: 99 % | HEART RATE: 79 BPM | WEIGHT: 154 LBS | HEIGHT: 62 IN | BODY MASS INDEX: 28.34 KG/M2 | SYSTOLIC BLOOD PRESSURE: 110 MMHG

## 2018-05-14 DIAGNOSIS — Z30.09 FAMILY PLANNING: ICD-10-CM

## 2018-05-14 DIAGNOSIS — Z30.017 ENCOUNTER FOR INITIAL PRESCRIPTION OF IMPLANTABLE SUBDERMAL CONTRACEPTIVE: Primary | ICD-10-CM

## 2018-05-14 LAB — HCG UR QL: NEGATIVE

## 2018-05-14 PROCEDURE — 81025 URINE PREGNANCY TEST: CPT | Mod: ZL | Performed by: ADVANCED PRACTICE MIDWIFE

## 2018-05-14 PROCEDURE — 11981 INSERTION DRUG DLVR IMPLANT: CPT | Performed by: ADVANCED PRACTICE MIDWIFE

## 2018-05-14 PROCEDURE — G0463 HOSPITAL OUTPT CLINIC VISIT: HCPCS | Mod: 25

## 2018-05-14 ASSESSMENT — PAIN SCALES - GENERAL: PAINLEVEL: NO PAIN (0)

## 2018-05-14 NOTE — NURSING NOTE
"Chief Complaint   Patient presents with     Contraception     Nexplanon        Initial /68 (BP Location: Left arm, Patient Position: Chair, Cuff Size: Adult Regular)  Pulse 79  Ht 5' 2\" (1.575 m)  Wt 154 lb (69.9 kg)  SpO2 99%  BMI 28.17 kg/m2 Estimated body mass index is 28.17 kg/(m^2) as calculated from the following:    Height as of this encounter: 5' 2\" (1.575 m).    Weight as of this encounter: 154 lb (69.9 kg).  Medication Reconciliation: complete    Chelsea Villalobos LPN    "

## 2018-05-14 NOTE — PROGRESS NOTES
CC: nexplanon insertion    HPI: Myrna Martinez is a 27 year old   who is here for nexplanon insertion.  She is currently using abstinence  for contraception but would like some long term with less maintenance.  We have discussed options for long term reversible contraception including nexplanon, depo provera, IUD and she has chosen nexplanon.  She is otherwise without complaints.  No LMP recorded. Patient has had an implant..    ROS:  CONSTITUTIONAL: NEGATIVE for fever, chills, change in weight  RESP: NEGATIVE for significant cough or SOB  CV: NEGATIVE for chest pain, palpitations or peripheral edema  GI: NEGATIVE for nausea, abdominal pain, heartburn, or change in bowel habits  : NEGATIVE for frequency, dysuria, hematuria, vaginal discharge  PSYCHIATRIC: NEGATIVE for changes in mood or affect      Past Medical History:   Diagnosis Date     Anxiety      Chronic headache      Depression     SI at [facility] 2015, was homeless at the time     Herpes genitalis in women     takes acyclovir     Seasonal allergies        Past Surgical History:   Procedure Laterality Date     APPENDECTOMY       MAMMOPLASTY REDUCTION           Family History   Problem Relation Age of Onset     Depression Mother      Hypertension Mother      Migraines Mother      no longer     Depression Father      Bipolar Disorder Maternal Grandmother      Depression Maternal Grandmother      Hypertension Maternal Grandmother      Bipolar Disorder Paternal Grandmother      Migraines Brother         No Known Allergies    Current Outpatient Prescriptions   Medication Sig Dispense Refill     hydrOXYzine (VISTARIL) 25 MG capsule Take 1-2 capsules (25-50 mg) by mouth 3 times daily as needed for anxiety or other (insomnia) 30 capsule 1     Ibuprofen (ADVIL PO) Take 400 mg by mouth every 6 hours as needed for moderate pain       acyclovir (ZOVIRAX) 400 MG tablet Take 1 tablet (400 mg) by mouth 3 times daily for 5 days 15 tablet 0     [DISCONTINUED]  "ValACYclovir HCl (VALTREX PO)          Social History     Social History     Marital status: Single     Spouse name: N/A     Number of children: 1     Years of education: 13     Occupational History     [employer]     Social History Main Topics     Smoking status: Never Smoker     Smokeless tobacco: Never Used     Alcohol use 0.0 oz/week     0 Standard drinks or equivalent per week      Comment: 2x/month     Drug use: 3.00 per week     Special: Marijuana      Comment: marijuana     Sexual activity: Not Currently     Partners: Male     Birth control/ protection: Implant     Other Topics Concern      Service No     Blood Transfusions Yes     Caffeine Concern Yes     2-3 cups coffee daily, 3  cans of soda daily      Exercise No     Seat Belt Yes     Self-Exams No     Parent/Sibling W/ Cabg, Mi Or Angioplasty Before 65f 55m? No     Social History Narrative       /68 (BP Location: Left arm, Patient Position: Chair, Cuff Size: Adult Regular)  Pulse 79  Ht 5' 2\" (1.575 m)  Wt 154 lb (69.9 kg)  SpO2 99%  BMI 28.17 kg/m2    Gen: Healthy appearing female in no acute distress  Ext: no cyanosis/clubbing/edema . Left arm without abnormalities.    Procedure:  After informed consent was obtained, the patient was positioned on the exam table with the  left  arm extended and elbow bent at 90 degree angle.  The biceps grove was identified and a william was placed 8-10cm from the medial epicondyle of the humerus.  A second william was placed a few cm past the original william as a guide.  The insertion site was then swabbed with betadine x 3.  5cc of 1% lidocaine was injected along the insertion tract.  Next the nexplanon was inserted without difficulty in the recommended fashion.  The device was removed and the implant was both visualized and palpated by myself and the patient.  A small amount of bleeding was noted at the insertion site.  A gauze and pressure wrap was applied to the insertion site.  " She tolerated the procedure well    Assessment/Plan    Urine hCG qualitative NEGATIVE  No sex in over 7 days    1) Contraception, successful nexplanon insertion   We discussed signs/symptoms of infection and when to call.  We again reviewed potential side effects including irregular bleeding.  She is to call with any questions.  Otherwise, return to clinic prn.    EVELIO Lindsay, ANDREY      10 minutes of this 20 minute visit was spent on face to face counseling about the nexplanon, the risks/benefits, side effects and placement in addition to the procedure.

## 2018-05-14 NOTE — MR AVS SNAPSHOT
After Visit Summary   5/14/2018    Myrna Martinez    MRN: [id number]           Patient Information     Date Of Birth          1990        Visit Information        Provider Department      5/14/2018 11:00 AM [facility] L [facility]        Today's Diagnoses     Encounter for initial prescription of implantable subdermal contraceptive    -  1    Family planning          Care Instructions    Return to office as needed.    Thank you for allowing Jose L FRASER CNM and our OB team to participate in your care.  If you have a scheduling or an appointment question please contact [facility] Health Unit Coordinator at their direct line [phone].   ALL nursing questions or concerns can be directed to your OB nurse at: [phone] - leah              Follow-ups after your visit        Who to contact     If you have questions or need follow up information about today's clinic visit or your schedule please contact [facility] directly at [phone].  Normal or non-critical lab and imaging results will be communicated to you by Dazohart, letter or phone within 4 business days after the clinic has received the results. If you do not hear from us within 7 days, please contact the clinic through Dazohart or phone. If you have a critical or abnormal lab result, we will notify you by phone as soon as possible.  Submit refill requests through Trading Block or call your pharmacy and they will forward the refill request to us. Please allow 3 business days for your refill to be completed.          Additional Information About Your Visit        Dazohart Information     Trading Block gives you secure access to your electronic health record. If you see a primary care provider, you can also send messages to your care team and make appointments. If you have questions, please call your primary care clinic.  If you do not have a primary care provider, please call [phone] and they will  "assist you.        Care EveryWhere ID     This is your Care EveryWhere ID. This could be used by other organizations to access your [place] medical records  [id number]        Your Vitals Were     Pulse Height Pulse Oximetry BMI (Body Mass Index)          79 5' 2\" (1.575 m) 99% 28.17 kg/m2         Blood Pressure from Last 3 Encounters:   05/14/18 110/68   05/10/18 128/76   12/27/17 116/78    Weight from Last 3 Encounters:   05/14/18 154 lb (69.9 kg)   05/10/18 154 lb 9.6 oz (70.1 kg)   12/27/17 156 lb (70.8 kg)              We Performed the Following     ETONOGESTREL IMPLANT SYSTEM     HCG qualitative urine     INSERTION NON-BIODEGRADABLE DRUG DELIVERY IMPLANT          Today's Medication Changes          These changes are accurate as of 5/14/18  6:21 PM.  If you have any questions, ask your nurse or doctor.               Start taking these medicines.        Dose/Directions    etonogestrel 68 MG Impl   Commonly known as:  IMPLANON/NEXPLANON   Used for:  Encounter for initial prescription of implantable subdermal contraceptive, Family planning   Started by:  Jose L Tan APRN CNM        Dose:  1 each   1 each (68 mg) by Subdermal route continuous   Refills:  0            Where to get your medicines      Some of these will need a paper prescription and others can be bought over the counter.  Ask your nurse if you have questions.     You don't need a prescription for these medications     etonogestrel 68 MG Impl                Primary Care Provider Office Phone # Fax #    Teresa Dawson [phone]       [address] [place] [address]        Equal Access to Services     [facility]: Hadii cecilio lucio hadasho Soomaali, waaxda luqadaha, qaybta kaalmada geraldine cowan . So [facility] [phone].    ATENCIÓN: Si habla español, tiene a ojeda disposición servicios gratuitos de asistencia lingüística. Llame al [phone].    We comply with applicable federal civil " rights laws and [place] laws. We do not discriminate on the basis of race, color, national origin, age, disability, sex, sexual orientation, or gender identity.            Thank you!     Thank you for choosing [facility]  [facility] your [facility]. Our goal is always to provide you with excellent care. Hearing back from our patients is one way we can continue to improve our services. Please take a few minutes to complete the written survey that you may receive in the mail after your visit with us. Thank you!             Your Updated Medication List - Protect others around you: Learn how to safely use, store and throw away your medicines at www.disposemymeds.org.          This list is accurate as of 5/14/18  6:21 PM.  Always use your most recent med list.                   Brand Name Dispense Instructions for use Diagnosis    acyclovir 400 MG tablet    ZOVIRAX    15 tablet    Take 1 tablet (400 mg) by mouth 3 times daily for 5 days        ADVIL PO      Take 400 mg by mouth every 6 hours as needed for moderate pain        etonogestrel 68 MG Impl    IMPLANON/NEXPLANON     1 each (68 mg) by Subdermal route continuous    Encounter for initial prescription of implantable subdermal contraceptive, Family planning       hydrOXYzine 25 MG capsule    VISTARIL    30 capsule    Take 1-2 capsules (25-50 mg) by mouth 3 times daily as needed for anxiety or other (insomnia)    NICHOLE (generalized anxiety disorder)

## 2018-05-14 NOTE — PATIENT INSTRUCTIONS
Return to office as needed.    Thank you for allowing Jose L FRASER CNM and our OB team to participate in your care.  If you have a scheduling or an appointment question please contact [facility] Health Unit Coordinator at their direct line [phone].   ALL nursing questions or concerns can be directed to your OB nurse at: [phone] - [place]

## 2018-06-12 ENCOUNTER — OFFICE VISIT (OUTPATIENT)
Dept: BEHAVIORAL HEALTH | Facility: OTHER | Age: 28
End: 2018-06-12
Attending: SOCIAL WORKER
Payer: MEDICAID

## 2018-06-12 DIAGNOSIS — F33.1 MAJOR DEPRESSIVE DISORDER, RECURRENT EPISODE, MODERATE (H): Primary | ICD-10-CM

## 2018-06-12 DIAGNOSIS — F41.1 GAD (GENERALIZED ANXIETY DISORDER): ICD-10-CM

## 2018-06-12 PROCEDURE — 90837 PSYTX W PT 60 MINUTES: CPT | Performed by: SOCIAL WORKER

## 2018-06-12 PROCEDURE — 90785 PSYTX COMPLEX INTERACTIVE: CPT | Performed by: SOCIAL WORKER

## 2018-06-12 ASSESSMENT — ANXIETY QUESTIONNAIRES
7. FEELING AFRAID AS IF SOMETHING AWFUL MIGHT HAPPEN: NEARLY EVERY DAY
2. NOT BEING ABLE TO STOP OR CONTROL WORRYING: NEARLY EVERY DAY
5. BEING SO RESTLESS THAT IT IS HARD TO SIT STILL: SEVERAL DAYS
1. FEELING NERVOUS, ANXIOUS, OR ON EDGE: MORE THAN HALF THE DAYS
3. WORRYING TOO MUCH ABOUT DIFFERENT THINGS: NEARLY EVERY DAY
IF YOU CHECKED OFF ANY PROBLEMS ON THIS QUESTIONNAIRE, HOW DIFFICULT HAVE THESE PROBLEMS MADE IT FOR YOU TO DO YOUR WORK, TAKE CARE OF THINGS AT HOME, OR GET ALONG WITH OTHER PEOPLE: EXTREMELY DIFFICULT
GAD7 TOTAL SCORE: 16
6. BECOMING EASILY ANNOYED OR IRRITABLE: NEARLY EVERY DAY

## 2018-06-12 ASSESSMENT — PATIENT HEALTH QUESTIONNAIRE - PHQ9: 5. POOR APPETITE OR OVEREATING: SEVERAL DAYS
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
## 2018-06-12 NOTE — MR AVS SNAPSHOT
After Visit Summary   6/12/2018    Myrna Martinez    MRN: [id number]           Patient Information     Date Of Birth          1990        Visit Information        Provider Department      6/12/2018 10:00 AM Sandee Waller LICSW [facility]        Today's Diagnoses     Major depressive disorder, recurrent episode, moderate (H)    -  1    NICHOLE (generalized anxiety disorder)           Follow-ups after your visit        Your next 10 appointments already scheduled     Alphonso 15, 2018  9:45 AM CDT   (Arrive by 9:30 AM)   SHORT with Teresa Dawson MD   [facility] [place] ([facility] [place] )    [address] [place] [address]  [place] [address]   [phone]           Please have the patient arrive 15 minutes early.            Jun 18, 2018  9:00 AM CDT   (Arrive by 8:45 AM)   Return Visit with DARLING Mcfadden   [facility] [place] ([facility] [place] )    [address]  [place] [id number] [phone]            Jun 25, 2018  9:00 AM CDT   (Arrive by 8:45 AM)   Return Visit with DARLING Mcfadden   [facility] [place] ([facility] [place] )    [address] [place] [address]  [place] [id number] [phone]            Jul 02, 2018  9:00 AM CDT   (Arrive by 8:45 AM)   Return Visit with DARLING Mcfadden   [facility] [place] ([facility] [place] )    [address]  [place] [id number] [phone]            Jul 09, 2018  9:00 AM CDT   (Arrive by 8:45 AM)   Return Visit with DARLING Mcfadden   [facility] [place] ([facility] [place] )    [address] [place] [address]  [place] [id number] [phone]              Who to contact     If you have questions or need follow up information about today's clinic visit or your schedule please [facility] at [phone].  Normal or non-critical lab and imaging results will be communicated to you by  MyChart, letter or phone within 4 business days after the clinic has received the results. If you do not hear from us within 7 days, please contact the clinic through [employer] or phone. If you have a critical or abnormal lab result, we will notify you by phone as soon as possible.  Submit refill requests through [employer] or call your pharmacy and they will forward the refill request to us. Please allow 3 business days for your refill to be completed.          Additional Information About Your Visit        [employer] Information     [employer] gives you secure access to your electronic health record. If you see a primary care provider, you can also send messages to your care team and make appointments. If you have questions, please call your primary care clinic.  If you do not have a primary care provider, please call [phone] and they will assist you.        Care EveryWhere ID     This is your Care EveryWhere ID. This could be used by other organizations to access your [facility] medical records  [id number]         Blood Pressure from Last 3 Encounters:   05/14/18 110/68   05/10/18 128/76   12/27/17 116/78    Weight from Last 3 Encounters:   05/14/18 154 lb (69.9 kg)   05/10/18 154 lb 9.6 oz (70.1 kg)   12/27/17 156 lb (70.8 kg)              Today, you had the following     No orders found for display       Primary Care Provider Office Phone # Fax #    Teresa Dawson [phone] [address] [place] [address]        Equal Access to Services     [facility]: Hadii aad ku hadasho Soomaali, waaxda luqadaha, qaybta kaalmada adeegyada, geraldine valencia . So [facility] [phone].    ATENCIÓN: Si habla español, tiene a ojeda disposición servicios gratuitos de asistencia lingüística. Llame al [phone].    We comply with applicable federal civil rights laws and [place] laws. We do not discriminate on the basis of race, color, national origin, age, disability, sex, sexual  orientation, or gender identity.            Thank you!     [facility] you [facility]  [facility] your [facility]. Our goal is always to provide you with excellent care. Hearing back from our patients is one way we can continue to improve our services. Please take a few minutes to complete the written survey that you may receive in the mail after your visit with us. Thank you!             Your Updated Medication List - Protect others around you: Learn how to safely use, store and throw away your medicines at www.disposemymeds.org.          This list is accurate as of 6/12/18  3:47 PM.  Always use your most recent med list.                   Brand Name Dispense Instructions for use Diagnosis    acyclovir 400 MG tablet    ZOVIRAX    15 tablet    Take 1 tablet (400 mg) by mouth 3 times daily for 5 days        ADVIL PO      Take 400 mg by mouth every 6 hours as needed for moderate pain        etonogestrel 68 MG Impl    IMPLANON/NEXPLANON     1 each (68 mg) by Subdermal route continuous    Encounter for initial prescription of implantable subdermal contraceptive, Family planning       hydrOXYzine 25 MG capsule    VISTARIL    30 capsule    Take 1-2 capsules (25-50 mg) by mouth 3 times daily as needed for anxiety or other (insomnia)    NICHOLE (generalized anxiety disorder)

## 2018-06-13 ASSESSMENT — ANXIETY QUESTIONNAIRES: GAD7 TOTAL SCORE: 16

## 2018-06-13 ASSESSMENT — PATIENT HEALTH QUESTIONNAIRE - PHQ9: SUM OF ALL RESPONSES TO PHQ QUESTIONS 1-9: 13

## 2018-06-15 ENCOUNTER — APPOINTMENT (OUTPATIENT)
Dept: FAMILY MEDICINE | Facility: OTHER | Age: 28
End: 2018-06-15
Attending: FAMILY MEDICINE
Payer: MEDICAID

## 2018-06-18 ENCOUNTER — OFFICE VISIT (OUTPATIENT)
Dept: BEHAVIORAL HEALTH | Facility: OTHER | Age: 28
End: 2018-06-18
Attending: SOCIAL WORKER
Payer: MEDICAID

## 2018-06-18 DIAGNOSIS — F33.1 MAJOR DEPRESSIVE DISORDER, RECURRENT EPISODE, MODERATE (H): ICD-10-CM

## 2018-06-18 DIAGNOSIS — F41.1 GAD (GENERALIZED ANXIETY DISORDER): Primary | ICD-10-CM

## 2018-06-18 PROCEDURE — 90837 PSYTX W PT 60 MINUTES: CPT | Performed by: SOCIAL WORKER

## 2018-06-18 ASSESSMENT — ANXIETY QUESTIONNAIRES
IF YOU CHECKED OFF ANY PROBLEMS ON THIS QUESTIONNAIRE, HOW DIFFICULT HAVE THESE PROBLEMS MADE IT FOR YOU TO DO YOUR WORK, TAKE CARE OF THINGS AT HOME, OR GET ALONG WITH OTHER PEOPLE: SOMEWHAT DIFFICULT
GAD7 TOTAL SCORE: 12
7. FEELING AFRAID AS IF SOMETHING AWFUL MIGHT HAPPEN: MORE THAN HALF THE DAYS
1. FEELING NERVOUS, ANXIOUS, OR ON EDGE: MORE THAN HALF THE DAYS
5. BEING SO RESTLESS THAT IT IS HARD TO SIT STILL: NOT AT ALL
2. NOT BEING ABLE TO STOP OR CONTROL WORRYING: MORE THAN HALF THE DAYS
6. BECOMING EASILY ANNOYED OR IRRITABLE: NEARLY EVERY DAY
3. WORRYING TOO MUCH ABOUT DIFFERENT THINGS: MORE THAN HALF THE DAYS

## 2018-06-18 ASSESSMENT — PATIENT HEALTH QUESTIONNAIRE - PHQ9: 5. POOR APPETITE OR OVEREATING: SEVERAL DAYS
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
## 2018-06-18 NOTE — MR AVS SNAPSHOT
After Visit Summary   6/18/2018    Myrna Martinez    MRN: [id number]           Patient Information     Date Of Birth          1990        Visit Information        Provider Department      6/18/2018 9:00 AM [facility]        Today's Diagnoses     NICHOLE (generalized anxiety disorder)    -  1    Major depressive disorder, recurrent episode, moderate (H)           Follow-ups after your visit        Your next 10 appointments already scheduled     Jun 20, 2018 10:00 AM CDT   (Arrive by 9:45 AM)   SHORT with Teresa Dawson MD   [facility] ([facility] [place] )    [address] [place] [address]  [place] [address]   [phone]           Please have the patient arrive 15 minutes early.            Jun 25, 2018  9:00 AM CDT   (Arrive by 8:45 AM)   Return Visit with DARLING Mcfadden   [facility] [place] ([facility] [place] )    [address]  [place] [id number] [phone]            Jul 02, 2018  9:00 AM CDT   (Arrive by 8:45 AM)   [facility]   [facility] [place] ([facility] [place] )    [address]  [place] [id number] [phone]            Jul 09, 2018  9:00 AM CDT   (Arrive by 8:45 AM)   [facility]   [facility] ([facility] [place] )    [address]  [place] [id number] [phone]              Who to contact     If you have questions or need follow up information about today's clinic visit or your schedule please contact [facility] directly at [phone].  Normal or non-critical lab and imaging results will be communicated to you by MyChart, letter or phone within 4 business days after the clinic has received the results. If you do not hear from us within 7 days, please contact the clinic through MyChart or phone. If you have a critical or abnormal lab result,  we will notify you by phone as soon as possible.  Submit refill requests through [employer] or call your pharmacy and they will forward the refill request to us. Please allow 3 business days for your refill to be completed.          Additional Information About Your Visit        [facility] Information     [employer] gives you secure access to your electronic health record. If you see a primary care provider, you can also send messages to your care team and make appointments. If you have questions, please call your primary care clinic.  If you do not have a primary care provider, please call [phone] and they will assist you.        Care EveryWhere ID     This is your Care EveryWhere ID. This could be used by other organizations to access your [facility] medical records  [id number]         Blood Pressure from Last 3 Encounters:   05/14/18 110/68   05/10/18 128/76   12/27/17 116/78    Weight from Last 3 Encounters:   05/14/18 154 lb (69.9 kg)   05/10/18 154 lb 9.6 oz (70.1 kg)   12/27/17 156 lb (70.8 kg)              Today, you had the following     No orders found for display       Primary Care Provider Office Phone # Fax #    Teresa Dawson [phone] [address] [place] [address]        Equal Access to Services     CAROL PUGA : Hadii cecilio ku hadasho Soomaali, waaxda luqadaha, qaybta kaalmada adeegyada, waxay gregory haydianan donovan matthews. So [facility] [phone].    ATENCIÓN: Si habla español, tiene a ojeda disposición servicios gratuitos de asistencia lingüística. Llame al [phone].    We comply with applicable federal civil rights laws and [place] laws. We do not discriminate on the basis of race, color, national origin, age, disability, sex, sexual orientation, or gender identity.            Thank you!     Thank you for choosing [facility]  for your care. Our goal is always to provide you with excellent care. Hearing back from our patients is one way we can  continue to improve our services. Please take a few minutes to complete the written survey that you may receive in the mail after your visit with us. Thank you!             Your Updated Medication List - Protect others around you: Learn how to safely use, store and throw away your medicines at www.disposemymeds.org.          This list is accurate as of 6/18/18  2:20 PM.  Always use your most recent med list.                   Brand Name Dispense Instructions for use Diagnosis    acyclovir 400 MG tablet    ZOVIRAX    15 tablet    Take 1 tablet (400 mg) by mouth 3 times daily for 5 days        ADVIL PO      Take 400 mg by mouth every 6 hours as needed for moderate pain        etonogestrel 68 MG Impl    IMPLANON/NEXPLANON     1 each (68 mg) by Subdermal route continuous    Encounter for initial prescription of implantable subdermal contraceptive, Family planning       hydrOXYzine 25 MG capsule    VISTARIL    30 capsule    Take 1-2 capsules (25-50 mg) by mouth 3 times daily as needed for anxiety or other (insomnia)    NICHOLE (generalized anxiety disorder)

## 2018-06-19 ASSESSMENT — PATIENT HEALTH QUESTIONNAIRE - PHQ9: SUM OF ALL RESPONSES TO PHQ QUESTIONS 1-9: 11

## 2018-06-19 ASSESSMENT — ANXIETY QUESTIONNAIRES: GAD7 TOTAL SCORE: 12

## 2018-06-20 ENCOUNTER — OFFICE VISIT (OUTPATIENT)
Dept: FAMILY MEDICINE | Facility: OTHER | Age: 28
End: 2018-06-20
Attending: FAMILY MEDICINE
Payer: MEDICAID

## 2018-06-20 VITALS
SYSTOLIC BLOOD PRESSURE: 106 MMHG | BODY MASS INDEX: 28.34 KG/M2 | TEMPERATURE: 98.6 F | DIASTOLIC BLOOD PRESSURE: 64 MMHG | HEART RATE: 75 BPM | WEIGHT: 154 LBS | HEIGHT: 62 IN | RESPIRATION RATE: 16 BRPM | OXYGEN SATURATION: 99 %

## 2018-06-20 DIAGNOSIS — F41.1 GAD (GENERALIZED ANXIETY DISORDER): ICD-10-CM

## 2018-06-20 DIAGNOSIS — A60.09 HERPES GENITALIS IN WOMEN: ICD-10-CM

## 2018-06-20 DIAGNOSIS — Z11.3 SCREEN FOR STD (SEXUALLY TRANSMITTED DISEASE): ICD-10-CM

## 2018-06-20 DIAGNOSIS — R09.81 CHRONIC NASAL CONGESTION: Primary | ICD-10-CM

## 2018-06-20 LAB
C TRACH DNA SPEC QL PROBE+SIG AMP: NOT DETECTED
N GONORRHOEA DNA SPEC QL PROBE+SIG AMP: NOT DETECTED
SPECIMEN SOURCE: NORMAL

## 2018-06-20 PROCEDURE — 87491 CHLMYD TRACH DNA AMP PROBE: CPT | Mod: ZL | Performed by: FAMILY MEDICINE

## 2018-06-20 PROCEDURE — 99214 OFFICE O/P EST MOD 30 MIN: CPT | Performed by: FAMILY MEDICINE

## 2018-06-20 PROCEDURE — 36415 COLL VENOUS BLD VENIPUNCTURE: CPT | Mod: ZL | Performed by: FAMILY MEDICINE

## 2018-06-20 PROCEDURE — 86803 HEPATITIS C AB TEST: CPT | Mod: ZL | Performed by: FAMILY MEDICINE

## 2018-06-20 PROCEDURE — 86780 TREPONEMA PALLIDUM: CPT | Mod: ZL | Performed by: FAMILY MEDICINE

## 2018-06-20 PROCEDURE — G0463 HOSPITAL OUTPT CLINIC VISIT: HCPCS

## 2018-06-20 PROCEDURE — 87389 HIV-1 AG W/HIV-1&-2 AB AG IA: CPT | Mod: ZL | Performed by: FAMILY MEDICINE

## 2018-06-20 PROCEDURE — 87591 N.GONORRHOEAE DNA AMP PROB: CPT | Mod: ZL | Performed by: FAMILY MEDICINE

## 2018-06-20 RX ORDER — LORATADINE 10 MG/1
10 TABLET ORAL DAILY
Qty: 30 TABLET | Refills: 3 | Status: SHIPPED | OUTPATIENT
Start: 2018-06-20 | End: 2018-08-08

## 2018-06-20 RX ORDER — FLUTICASONE PROPIONATE 50 MCG
1-2 SPRAY, SUSPENSION (ML) NASAL DAILY
Qty: 1 BOTTLE | Refills: 3 | Status: SHIPPED | OUTPATIENT
Start: 2018-06-20 | End: 2018-08-08

## 2018-06-20 RX ORDER — VALACYCLOVIR HYDROCHLORIDE 500 MG/1
500 TABLET, FILM COATED ORAL DAILY
Qty: 90 TABLET | Refills: 1 | Status: SHIPPED | OUTPATIENT
Start: 2018-06-20 | End: 2018-08-08

## 2018-06-20 ASSESSMENT — PAIN SCALES - GENERAL: PAINLEVEL: NO PAIN (0)

## 2018-06-20 NOTE — MR AVS SNAPSHOT
After Visit Summary   6/20/2018    Myrna Martinez    MRN: [id number]           Patient Information     Date Of Birth          1990        Visit Information        Provider Department      6/20/2018 10:00 AM Teresa Dawson MD [facility]        Today's Diagnoses     Chronic nasal congestion    -  1    Screen for STD (sexually transmitted disease)        NICHOLE (generalized anxiety disorder)        Herpes genitalis in women          Care Instructions    Restart nasal Flonase and Claritin nightly.  Use regularly - takes time to take effect.  Referral to ENT.    Restart Zoloft 25 mg daily, increasing to 50 mg daily after 1-2 weeks if tolerating.  Recheck in office in 1 month, sooner if concerns.  Continue counseling with Sandee DOUGLAS    Start daily Valtrex to suppress outbreaks.    If you get an outbreak, increase to twice daily for 3 days.    Will call with lab results for std testing.                Follow-ups after your visit        Additional Services     OTOLARYNGOLOGY REFERRAL       Your provider has referred you to: [facility] [phone]   [email or web address]    Please be aware that coverage of these services is subject to the terms and limitations of your health insurance plan.  Call member services at your health plan with any benefit or coverage questions.      Please bring the following with you to your appointment:    (1) Any X-Rays, CTs or MRIs which have been performed.  Contact the facility where they were done to arrange for  prior to your scheduled appointment.   (2) List of current medications  (3) This referral request   (4) Any documents/labs given to you for this referral                  Your next 10 appointments already scheduled     Jun 25, 2018  9:00 AM CDT   (Arrive by 8:45 AM)   Return Visit with [facility] ([facility]  [place] )    [address]  [place] [address]   [phone]            Jul 02, 2018  9:00 AM CDT   (Arrive by 8:45 AM)   Return Visit with Sandee Waller [facility] [place] ([facility] [place] [facility] [place] )    [address]  [place] [address]   [phone]            Jul 09, 2018  9:00 AM CDT   (Arrive by 8:45 AM)   Return Visit with Sandee Waller [facility] [place] ([facility] [place] [facility] [place] )    [address]  [place] [address] [phone]              Who to contact     If you have questions or need follow up information about today's clinic visit or your schedule please contact [facility] directly at [phone].  Normal or non-critical lab and imaging results will be communicated to you by MyChart, letter or phone within 4 business days after the clinic has received the results. If you do not hear from us within 7 days, please contact the clinic through [employer] or phone. If you have a critical or abnormal lab result, we will notify you by phone as soon as possible.  Submit refill requests through [employer] or call your pharmacy and they will forward the refill request to us. Please allow 3 business days for your refill to be completed.          Additional Information About Your Visit        MyChart Information     [employer] gives you secure access to your electronic health record. If you see a primary care provider, you can also send messages to your care team and make appointments. If you have questions, please call your primary care clinic.  If you do not have a primary care provider, please call [phone] and they will assist you.        Care EveryWhere ID     This is your Care EveryWhere ID. This could be used by other organizations to access your [facility] medical records  [id number]        Your Vitals Were     Pulse Temperature Respirations Height Pulse Oximetry BMI (Body Mass Index)    75 98.6  F (37  C)  "(Tympanic) 16 5' 2\" (1.575 m) 99% 28.17 kg/m2       Blood Pressure from Last 3 Encounters:   06/20/18 106/64   05/14/18 110/68   05/10/18 128/76    Weight from Last 3 Encounters:   06/20/18 154 lb (69.9 kg)   05/14/18 154 lb (69.9 kg)   05/10/18 154 lb 9.6 oz (70.1 kg)              We Performed the Following     GC/Chlamydia by PCR - [facility]     Hepatitis C antibody     HIV Antigen Antibody Combo     OTOLARYNGOLOGY REFERRAL     Treponema Abs w Reflex to RPR and Titer          Today's Medication Changes          These changes are accurate as of 6/20/18 10:55 AM.  If you have any questions, ask your nurse or doctor.               Start taking these medicines.        Dose/Directions    fluticasone 50 MCG/ACT spray   Commonly known as:  FLONASE   Used for:  Chronic nasal congestion   Started by:  Teresa Dawson MD        Dose:  1-2 spray   Spray 1-2 sprays into both nostrils daily   Quantity:  1 Bottle   Refills:  3       loratadine 10 MG tablet   Commonly known as:  CLARITIN   Used for:  Chronic nasal congestion   Started by:  Teresa Dawson MD        Dose:  10 mg   Take 1 tablet (10 mg) by mouth daily   Quantity:  30 tablet   Refills:  3       sertraline 50 MG tablet   Commonly known as:  ZOLOFT   Used for:  NICHOLE (generalized anxiety disorder)   Started by:  Teresa Dawson MD        Take 1/2 tablet (25 mg) for 1-2 weeks, then increase to 1 tablet orally daily   Quantity:  30 tablet   Refills:  1       valACYclovir 500 MG tablet   Commonly known as:  VALTREX   Used for:  Herpes genitalis in women   Started by:  Teresa Dawson MD        Dose:  500 mg   Take 1 tablet (500 mg) by mouth daily   Quantity:  90 tablet   Refills:  1            Where to get your medicines      These medications were sent to [facility] [address]  [address] [place] [address]     Phone:  [phone]     fluticasone 50 MCG/ACT spray    loratadine 10 MG tablet    sertraline 50 MG tablet    " valACYclovir 500 MG tablet                Primary Care Provider Office Phone # Fax #    Teresa Dawson [phone] [address]  [facility] [address]        Equal Access to Services     AMARILIS PUGA : Smita lucio johno Sobelindaali, waaxda luqadaha, qaybta kaalmada adeegyada, geraldine vargas laDeniaallie matthews. So [facility] [phone].    ATENCIÓN: Si habla español, tiene a ojeda disposición servicios gratuitos de asistencia lingüística. Llame al [phone].    We comply with applicable federal civil rights laws and [place] laws. We do not discriminate on the basis of race, color, national origin, age, disability, sex, sexual orientation, or gender identity.            Thank you!     Thank you for choosing [facility]  for your care. Our goal is always to provide you with excellent care. Hearing back from our patients is one way we can continue to improve our services. Please take a few minutes to complete the written survey that you may receive in the mail after your visit with us. Thank you!             Your Updated Medication List - Protect others around you: Learn how to safely use, store and throw away your medicines at www.disposemymeds.org.          This list is accurate as of 6/20/18 10:55 AM.  Always use your most recent med list.                   Brand Name Dispense Instructions for use Diagnosis    ADVIL PO      Take 400 mg by mouth every 6 hours as needed for moderate pain        etonogestrel 68 MG Impl    IMPLANON/NEXPLANON     1 each (68 mg) by Subdermal route continuous    Encounter for initial prescription of implantable subdermal contraceptive, Family planning       fluticasone 50 MCG/ACT spray    FLONASE    1 Bottle    Spray 1-2 sprays into both nostrils daily    Chronic nasal congestion       hydrOXYzine 25 MG capsule    VISTARIL    30 capsule    Take 1-2 capsules (25-50 mg) by mouth 3 times daily as needed for anxiety or other (insomnia)    NICHOLE  (generalized anxiety disorder)       loratadine 10 MG tablet    CLARITIN    30 tablet    Take 1 tablet (10 mg) by mouth daily    Chronic nasal congestion       sertraline 50 MG tablet    ZOLOFT    30 tablet    Take 1/2 tablet (25 mg) for 1-2 weeks, then increase to 1 tablet orally daily    NICHOLE (generalized anxiety disorder)       valACYclovir 500 MG tablet    VALTREX    90 tablet    Take 1 tablet (500 mg) by mouth daily    Herpes genitalis in women

## 2018-06-20 NOTE — PATIENT INSTRUCTIONS
Restart nasal Flonase and Claritin nightly.  Use regularly - takes time to take effect.  Referral to ENT.    Restart Zoloft 25 mg daily, increasing to 50 mg daily after 1-2 weeks if tolerating.  Recheck in office in 1 month, sooner if concerns.  Continue counseling with Sandee DOUGLAS    Start daily Valtrex to suppress outbreaks.    If you get an outbreak, increase to twice daily for 3 days.    Will call with lab results for std testing.

## 2018-06-20 NOTE — PROGRESS NOTES
SUBJECTIVE:                                                    Myrna Martinez is a 28 year old female who presents to clinic today for the following health issues:    Stuffy nose      Duration: years    Description (location/character/radiation): states she can't breath most of the time as her nares are swollen    Intensity:  severe    Accompanying signs and symptoms: Can't smell and feels the hearing is diminished in her left ear    History (similar episodes/previous evaluation): None    Precipitating or alleviating factors: exercising  seems to clear the passages    Therapies tried and outcome: None    Did see ENT over a year ago; had allergy testing    Not improving       Anxiety Follow-Up    Status since last visit: Worsened     Other associated symptoms:None    Following with Sandee Waller for counseling    Prior Prozac, then Zoloft; would like to restart; mom on Klonopin    Complicating factors:   Significant life event: Yes-  Separation from father of her sone after 5 years together; considering going back to school; trying to get place of her own   Current substance abuse: None  Depression symptoms: No  NICHOLE-7 SCORE 5/10/2018 6/12/2018 6/18/2018   Total Score 5 16 12       NICHOLE-7    Genital Herpes      Duration: chronic    Description (location/character/radiation): vaginal    Intensity:  moderate    Accompanying signs and symptoms: none    History (similar episodes/previous evaluation): use to get occasional outbreaks; now getting them monthly; is out of Valtrex, but would like to try suppressive therapy    Precipitating or alleviating factors: stress    Therapies tried and outcome: antiviral    Would also like complete std testing - asymptomatic         Problem list and histories reviewed & adjusted, as indicated.  Additional history: as documented    Current Outpatient Prescriptions   Medication     etonogestrel (IMPLANON/NEXPLANON) 68 MG IMPL     fluticasone (FLONASE) 50 MCG/ACT spray     loratadine  "(CLARITIN) 10 MG tablet     sertraline (ZOLOFT) 50 MG tablet     valACYclovir (VALTREX) 500 MG tablet     hydrOXYzine (VISTARIL) 25 MG capsule     Ibuprofen (ADVIL PO)     No current facility-administered medications for this visit.        Patient Active Problem List   Diagnosis     Major depressive disorder, recurrent episode, moderate (H)     Herpes genitalis in women     ACP (advance care planning)     NICHOLE (generalized anxiety disorder)     Family planning     Past Surgical History:   Procedure Laterality Date     APPENDECTOMY       MAMMOPLASTY REDUCTION  2011       Social History   Substance Use Topics     Smoking status: Never Smoker     Smokeless tobacco: Never Used     Alcohol use 0.0 oz/week     0 Standard drinks or equivalent per week      Comment: 2x/month     Family History   Problem Relation Age of Onset     Depression Mother      Hypertension Mother      Migraines Mother      no longer     Depression Father      Bipolar Disorder Maternal Grandmother      Depression Maternal Grandmother      Hypertension Maternal Grandmother      Bipolar Disorder Paternal Grandmother      Migraines Brother            ROS:  Constitutional, HEENT, cardiovascular, pulmonary, gi and gu systems are negative, except as otherwise noted.    OBJECTIVE:     /64 (BP Location: Right arm, Patient Position: Sitting, Cuff Size: Adult Regular)  Pulse 75  Temp 98.6  F (37  C) (Tympanic)  Resp 16  Ht 5' 2\" (1.575 m)  Wt 154 lb (69.9 kg)  SpO2 99%  BMI 28.17 kg/m2  Body mass index is 28.17 kg/(m^2).  GENERAL: healthy, alert and no distress  EYES: Eyes grossly normal to inspection, PERRL and conjunctivae and sclerae normal  HENT: normal cephalic/atraumatic, both ears: dull TMs, nose and mouth without ulcers or lesions, oropharynx clear and oral mucous membranes moist  NECK: no adenopathy, no asymmetry, masses, or scars and thyroid normal to palpation  RESP: lungs clear to auscultation - no rales, rhonchi or wheezes  CV: regular " rate and rhythm, normal S1 S2, no S3 or S4, no murmur, click or rub, no peripheral edema and peripheral pulses strong  ABDOMEN: soft, nontender, no hepatosplenomegaly, no masses and bowel sounds normal  MS: no gross musculoskeletal defects noted, no edema  SKIN: no suspicious lesions or rashes  PSYCH: mentation appears normal, affect normal/bright      ASSESSMENT/PLAN:     (R09.81) Chronic nasal congestion  (primary encounter diagnosis)  Comment: prior allergy testing; possibility of structural issues  - deviated septum, etc?  Plan: OTOLARYNGOLOGY REFERRAL, fluticasone (FLONASE)         50 MCG/ACT spray, loratadine (CLARITIN) 10 MG         tablet            (Z11.3) Screen for STD (sexually transmitted disease)  Plan: HIV Antigen Antibody Combo, Treponema Abs w         Reflex to RPR and Titer, Hepatitis C antibody,         GC/Chlamydia by PCR - [facility]    (F41.1) NICHOLE (generalized anxiety disorder)  Plan: sertraline (ZOLOFT) 50 MG tablet    (A60.09) Herpes genitalis in women  Plan: valACYclovir (VALTREX) 500 MG tablet    Patient Instructions   Restart nasal Flonase and Claritin nightly.  Use regularly - takes time to take effect.  Referral to ENT.    Restart Zoloft 25 mg daily, increasing to 50 mg daily after 1-2 weeks if tolerating.  Recheck in office in 1 month, sooner if concerns.  Continue counseling with Sandee DOUGLAS    Start daily Valtrex to suppress outbreaks.    If you get an outbreak, increase to twice daily for 3 days.    Will call with lab results for std testing.                Teresa Song MD  [facility]

## 2018-06-20 NOTE — NURSING NOTE
"Chief Complaint   Patient presents with     Nose Problem     constant stuffy nose     STD     Wants to see if there is a med she can take everyday for herpes besides acyclovyir       Initial /64 (BP Location: Right arm, Patient Position: Sitting, Cuff Size: Adult Regular)  Pulse 75  Temp 98.6  F (37  C) (Tympanic)  Resp 16  Ht 5' 2\" (1.575 m)  Wt 154 lb (69.9 kg)  SpO2 99%  BMI 28.17 kg/m2 Estimated body mass index is 28.17 kg/(m^2) as calculated from the following:    Height as of this encounter: 5' 2\" (1.575 m).    Weight as of this encounter: 154 lb (69.9 kg).  Medication Reconciliation: complete    Linda Hernandez LPN    "

## 2018-06-21 LAB
HCV AB SERPL QL IA: NONREACTIVE
HIV 1+2 AB+HIV1 P24 AG SERPL QL IA: NONREACTIVE
T PALLIDUM AB SER QL: NONREACTIVE

## 2018-06-25 ENCOUNTER — OFFICE VISIT (OUTPATIENT)
Dept: BEHAVIORAL HEALTH | Facility: OTHER | Age: 28
End: 2018-06-25
Attending: SOCIAL WORKER
Payer: MEDICAID

## 2018-06-25 DIAGNOSIS — F33.1 MAJOR DEPRESSIVE DISORDER, RECURRENT EPISODE, MODERATE (H): ICD-10-CM

## 2018-06-25 DIAGNOSIS — F41.1 GAD (GENERALIZED ANXIETY DISORDER): Primary | ICD-10-CM

## 2018-06-25 PROCEDURE — 90834 PSYTX W PT 45 MINUTES: CPT | Performed by: SOCIAL WORKER

## 2018-06-25 ASSESSMENT — PATIENT HEALTH QUESTIONNAIRE - PHQ9: 5. POOR APPETITE OR OVEREATING: MORE THAN HALF THE DAYS

## 2018-06-25 ASSESSMENT — ANXIETY QUESTIONNAIRES
5. BEING SO RESTLESS THAT IT IS HARD TO SIT STILL: MORE THAN HALF THE DAYS
3. WORRYING TOO MUCH ABOUT DIFFERENT THINGS: MORE THAN HALF THE DAYS
IF YOU CHECKED OFF ANY PROBLEMS ON THIS QUESTIONNAIRE, HOW DIFFICULT HAVE THESE PROBLEMS MADE IT FOR YOU TO DO YOUR WORK, TAKE CARE OF THINGS AT HOME, OR GET ALONG WITH OTHER PEOPLE: SOMEWHAT DIFFICULT
GAD7 TOTAL SCORE: 15
1. FEELING NERVOUS, ANXIOUS, OR ON EDGE: MORE THAN HALF THE DAYS
7. FEELING AFRAID AS IF SOMETHING AWFUL MIGHT HAPPEN: NEARLY EVERY DAY
6. BECOMING EASILY ANNOYED OR IRRITABLE: MORE THAN HALF THE DAYS
2. NOT BEING ABLE TO STOP OR CONTROL WORRYING: MORE THAN HALF THE DAYS

## 2018-06-25 NOTE — MR AVS SNAPSHOT
After Visit Summary   6/25/2018    Myrna Martinez    MRN: [id number]           Patient Information     Date Of Birth          1990        Visit Information        Provider Department      6/25/2018 9:00 AM Sandee Waller [facility]        Today's Diagnoses     NICHOLE (generalized anxiety disorder)    -  1    Major depressive disorder, recurrent episode, moderate (H)           Follow-ups after your visit        Your next 10 appointments already scheduled     Jun 26, 2018  3:00 PM CDT   (Arrive by 2:45 PM)   Return Visit with Nolvia Ramos PA-C   [facility] ([facility] [place] )    [address]  [place] [address]   [phone]            Jul 02, 2018  9:00 AM CDT   (Arrive by 8:45 AM)   Return Visit with Sandee Waller [facility] ([facility] [place] )    [address]  [place] [address] [phone]            Jul 09, 2018  9:00 AM CDT   (Arrive by 8:45 AM)   Return Visit with Sandee Waller [facility] ([facility] [place] )    [address] [place] [address] [phone]              Who to contact     If you have questions or need follow up information about today's clinic visit or your schedule please contact [facility] directly at [phone].  Normal or non-critical lab and imaging results will be communicated to you by MyChart, letter or phone within 4 business days after the clinic has received the results. If you do not hear from us within 7 days, please contact the clinic through MyChart or phone. If you have a critical or abnormal lab result, we will notify you by phone as soon as possible.  Submit refill requests through [employer] or call your pharmacy and they will forward the refill request to us. Please allow 3 business days for your refill to be completed.          Additional Information About Your Visit        [facility]  Information     [employer] gives you secure access to your electronic health record. If you see a primary care provider, you can also send messages to your care team and make appointments. If you have questions, please call your primary care clinic.  If you do not have a primary care provider, please call [phone] and they will assist you.        Care EveryWhere ID     This is your Care EveryWhere ID. This could be used by other organizations to access your [facility] medical records  [id number]         Blood Pressure from Last 3 Encounters:   06/20/18 106/64   05/14/18 110/68   05/10/18 128/76    Weight from Last 3 Encounters:   06/20/18 154 lb (69.9 kg)   05/14/18 154 lb (69.9 kg)   05/10/18 154 lb 9.6 oz (70.1 kg)              Today, you had the following     No orders found for display       Primary Care Provider Office Phone # Fax #    Teresa Dawson [phone] [address] [address]        Equal Access to Services     [facility]: Hadii aad ku hadasho Soomaali, waaxda luqadaha, qaybta kaalmada adeegyada, waxay idiin haydianan donovan valencia . So [facility] [phone].    ATENCIÓN: Si habla español, tiene a ojeda disposición servicios gratuitos de asistencia lingüística. Llame al [phone].    We comply with applicable federal civil rights laws and [place] laws. We do not discriminate on the basis of race, color, national origin, age, disability, sex, sexual orientation, or gender identity.            Thank you!     Thank you for choosing [facility]  for your care. Our goal is always to provide you with excellent care. Hearing back from our patients is one way we can continue to improve our services. Please take a few minutes to complete the written survey that you may receive in the mail after your visit with us. Thank you!             Your Updated Medication List - Protect others around you: Learn how to safely use, store and throw away your  medicines at www.disposemymeds.org.          This list is accurate as of 6/25/18 11:46 AM.  Always use your most recent med list.                   Brand Name Dispense Instructions for use Diagnosis    ADVIL PO      Take 400 mg by mouth every 6 hours as needed for moderate pain        etonogestrel 68 MG Impl    IMPLANON/NEXPLANON     1 each (68 mg) by Subdermal route continuous    Encounter for initial prescription of implantable subdermal contraceptive, Family planning       fluticasone 50 MCG/ACT spray    FLONASE    1 Bottle    Spray 1-2 sprays into both nostrils daily    Chronic nasal congestion       hydrOXYzine 25 MG capsule    VISTARIL    30 capsule    Take 1-2 capsules (25-50 mg) by mouth 3 times daily as needed for anxiety or other (insomnia)    NICHOLE (generalized anxiety disorder)       loratadine 10 MG tablet    CLARITIN    30 tablet    Take 1 tablet (10 mg) by mouth daily    Chronic nasal congestion       sertraline 50 MG tablet    ZOLOFT    30 tablet    Take 1/2 tablet (25 mg) for 1-2 weeks, then increase to 1 tablet orally daily    NICHOLE (generalized anxiety disorder)       valACYclovir 500 MG tablet    VALTREX    90 tablet    Take 1 tablet (500 mg) by mouth daily    Herpes genitalis in women
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
## 2018-06-26 ASSESSMENT — ANXIETY QUESTIONNAIRES: GAD7 TOTAL SCORE: 15

## 2018-06-26 ASSESSMENT — PATIENT HEALTH QUESTIONNAIRE - PHQ9: SUM OF ALL RESPONSES TO PHQ QUESTIONS 1-9: 7

## 2018-06-29 ENCOUNTER — OFFICE VISIT (OUTPATIENT)
Dept: FAMILY MEDICINE | Facility: OTHER | Age: 28
End: 2018-06-29
Attending: FAMILY MEDICINE
Payer: MEDICAID

## 2018-06-29 VITALS
WEIGHT: 148 LBS | RESPIRATION RATE: 19 BRPM | HEART RATE: 97 BPM | OXYGEN SATURATION: 98 % | SYSTOLIC BLOOD PRESSURE: 108 MMHG | BODY MASS INDEX: 27.23 KG/M2 | HEIGHT: 62 IN | DIASTOLIC BLOOD PRESSURE: 74 MMHG

## 2018-06-29 DIAGNOSIS — N76.0 BV (BACTERIAL VAGINOSIS): Primary | ICD-10-CM

## 2018-06-29 DIAGNOSIS — R10.2 VAGINAL PAIN: ICD-10-CM

## 2018-06-29 DIAGNOSIS — B96.89 BV (BACTERIAL VAGINOSIS): Primary | ICD-10-CM

## 2018-06-29 LAB
SPECIMEN SOURCE: ABNORMAL
WET PREP SPEC: ABNORMAL

## 2018-06-29 PROCEDURE — 87210 SMEAR WET MOUNT SALINE/INK: CPT | Mod: ZL | Performed by: FAMILY MEDICINE

## 2018-06-29 PROCEDURE — G0463 HOSPITAL OUTPT CLINIC VISIT: HCPCS

## 2018-06-29 PROCEDURE — 99213 OFFICE O/P EST LOW 20 MIN: CPT | Performed by: FAMILY MEDICINE

## 2018-06-29 RX ORDER — METRONIDAZOLE 500 MG/1
500 TABLET ORAL 2 TIMES DAILY
Qty: 14 TABLET | Refills: 0 | Status: SHIPPED | OUTPATIENT
Start: 2018-06-29 | End: 2018-08-08

## 2018-06-29 ASSESSMENT — PAIN SCALES - GENERAL: PAINLEVEL: NO PAIN (0)

## 2018-06-29 NOTE — PATIENT INSTRUCTIONS
Bacterial infection present.  Complete antibiotic course.  Keep hydrated.   Yogurt or probiotics advised.  Follow up as needed.

## 2018-06-29 NOTE — NURSING NOTE
"Chief Complaint   Patient presents with     Vaginal Problem       Initial /74  Pulse 97  Resp 19  Ht 5' 2\" (1.575 m)  Wt 148 lb (67.1 kg)  SpO2 98%  Breastfeeding? No  BMI 27.07 kg/m2 Estimated body mass index is 27.07 kg/(m^2) as calculated from the following:    Height as of this encounter: 5' 2\" (1.575 m).    Weight as of this encounter: 148 lb (67.1 kg).  Medication Reconciliation: complete    Layne Lal LPN    "

## 2018-06-29 NOTE — MR AVS SNAPSHOT
After Visit Summary   6/29/2018    Myrna Martinez    MRN: [id number]           Patient Information     Date Of Birth          1990        Visit Information        Provider Department      6/29/2018 10:00 AM Teresa Dawson MD [facility]        Today's Diagnoses     BV (bacterial vaginosis)    -  1    Vaginal pain          Care Instructions    Results for orders placed or performed in visit on 06/29/18 (from the past 24 hour(s))   Wet prep   Result Value Ref Range    Specimen Description Vagina     Wet Prep Few  WBC'S seen       Wet Prep Clue cells seen (A)     Wet Prep No Trichomonas seen     Wet Prep No yeast seen          Bacterial infection present.  Complete antibiotic course.  Keep hydrated.   Yogurt or probiotics advised.  Follow up as needed.            Follow-ups after your visit        Your next 10 appointments already scheduled     Jul 02, 2018  9:00 AM CDT   (Arrive by 8:45 AM)   [facility]    [address] [place] [id number] [phone]            Jul 09, 2018  9:00 AM CDT   (Arrive by 8:45 AM)   Return Visit with DARLING Mcfadden   [facility]    [address] [place] [id number] [phone]              Who to contact     If you have questions or need follow up information about today's clinic visit or your schedule please contact [facility] directly at [phone].  Normal or non-critical lab and imaging results will be communicated to you by MyChart, letter or phone within 4 business days after the clinic has received the results. If you do not hear from us within 7 days, please contact the clinic through MyChart or phone. If you have a critical or abnormal lab result, we will notify you by phone as soon as possible.  Submit refill requests through [employer] or call your  "pharmacy and they will forward the refill request to us. Please allow 3 business days for your refill to be completed.          Additional Information About Your Visit        [employer] Information     [employer] gives you secure access to your electronic health record. If you see a primary care provider, you can also send messages to your care team and make appointments. If you have questions, please call your primary care clinic.  If you do not have a primary care provider, please call [phone] and they will assist you.        Care EveryWhere ID     This is your Care EveryWhere ID. This could be used by other organizations to access your [place] medical records  [id number]        Your Vitals Were     Pulse Respirations Height Pulse Oximetry Breastfeeding? BMI (Body Mass Index)    97 19 5' 2\" (1.575 m) 98% No 27.07 kg/m2       Blood Pressure from Last 3 Encounters:   06/29/18 108/74   06/20/18 106/64   05/14/18 110/68    Weight from Last 3 Encounters:   06/29/18 148 lb (67.1 kg)   06/20/18 154 lb (69.9 kg)   05/14/18 154 lb (69.9 kg)              We Performed the Following     Wet prep          Today's Medication Changes          These changes are accurate as of 6/29/18 10:33 AM.  If you have any questions, ask your nurse or doctor.               Start taking these medicines.        Dose/Directions    metroNIDAZOLE 500 MG tablet   Commonly known as:  FLAGYL   Used for:  BV (bacterial vaginosis)   Started by:  Teresa Dawson MD        Dose:  500 mg   Take 1 tablet (500 mg) by mouth 2 times daily   Quantity:  14 tablet   Refills:  0            Where to get your medicines      These medications were sent to [facility] - [facility] - [address]  [address] [address]     Phone:  [phone]     metroNIDAZOLE 500 MG tablet                Primary Care Provider Office Phone # Fax #    Teresa Dawson [phone] [address] [address]      "   Equal Access to Services     [facility] : Hadii aad ku hadlolitaasahish Shaniqualilo, waangelida luqadaha, qaybta kadanniellegeraldine larson. So [facility] [phone].    ATENCIÓN: Si habla español, tiene a ojeda disposición servicios gratuitos de asistencia lingüística. Laylaame al [phone].    We comply with applicable federal civil rights laws and [place] laws. We do not discriminate on the basis of race, color, national origin, age, disability, sex, sexual orientation, or gender identity.            Thank you!     Thank you for choosing [facility]  for your care. Our goal is always to provide you with excellent care. Hearing back from our patients is one way we can continue to improve our services. Please take a few minutes to complete the written survey that you may receive in the mail after your visit with us. Thank you!             Your Updated Medication List - Protect others around you: Learn how to safely use, store and throw away your medicines at www.disposemymeds.org.          This list is accurate as of 6/29/18 10:33 AM.  Always use your most recent med list.                   Brand Name Dispense Instructions for use Diagnosis    ADVIL PO      Take 400 mg by mouth every 6 hours as needed for moderate pain        etonogestrel 68 MG Impl    IMPLANON/NEXPLANON     1 each (68 mg) by Subdermal route continuous    Encounter for initial prescription of implantable subdermal contraceptive, Family planning       fluticasone 50 MCG/ACT spray    FLONASE    1 Bottle    Spray 1-2 sprays into both nostrils daily    Chronic nasal congestion       hydrOXYzine 25 MG capsule    VISTARIL    30 capsule    Take 1-2 capsules (25-50 mg) by mouth 3 times daily as needed for anxiety or other (insomnia)    NICHOLE (generalized anxiety disorder)       loratadine 10 MG tablet    CLARITIN    30 tablet    Take 1 tablet (10 mg) by mouth daily    Chronic nasal congestion       metroNIDAZOLE 500 MG tablet     FLAGYL    14 tablet    Take 1 tablet (500 mg) by mouth 2 times daily    BV (bacterial vaginosis)       sertraline 50 MG tablet    ZOLOFT    30 tablet    Take 1/2 tablet (25 mg) for 1-2 weeks, then increase to 1 tablet orally daily    NICHOLE (generalized anxiety disorder)       valACYclovir 500 MG tablet    VALTREX    90 tablet    Take 1 tablet (500 mg) by mouth daily    Herpes genitalis in women

## 2018-06-29 NOTE — PROGRESS NOTES
SUBJECTIVE:   Myrna Martinez is a 28 year old female who presents to clinic today for the following health issues:      Vaginal Symptoms      Duration: ongoing for a few weeks, however worse yesterday and today    Description  burning and pain with wiping after bathroom use    Intensity:  mild    Accompanying signs and symptoms (fever/dysuria/abdominal or back pain): None    History  Sexually active: yes, single partner, contraception - IUD  Possibility of pregnancy: No  Recent antibiotic use: no     Precipitating or alleviating factors: None    Therapies tried and outcome: none   Outcome:     Recent std testing negative    Has HSV, but no current outbreak; no blisters or ulcerations    No new partners    No urinary frequency, urgency    Does have menses    Did just get Implanon last month        Problem list and histories reviewed & adjusted, as indicated.  Additional history: as documented    Current Outpatient Prescriptions   Medication     etonogestrel (IMPLANON/NEXPLANON) 68 MG IMPL     fluticasone (FLONASE) 50 MCG/ACT spray     hydrOXYzine (VISTARIL) 25 MG capsule     Ibuprofen (ADVIL PO)     loratadine (CLARITIN) 10 MG tablet     metroNIDAZOLE (FLAGYL) 500 MG tablet     sertraline (ZOLOFT) 50 MG tablet     valACYclovir (VALTREX) 500 MG tablet     No current facility-administered medications for this visit.        Patient Active Problem List   Diagnosis     Major depressive disorder, recurrent episode, moderate (H)     Herpes genitalis in women     ACP (advance care planning)     NICHOLE (generalized anxiety disorder)     Family planning     Past Surgical History:   Procedure Laterality Date     APPENDECTOMY       MAMMOPLASTY REDUCTION  2011       Social History   Substance Use Topics     Smoking status: Never Smoker     Smokeless tobacco: Never Used     Alcohol use 0.0 oz/week     0 Standard drinks or equivalent per week      Comment: 2x/month     Family History   Problem Relation Age of Onset     Depression  "Mother      Hypertension Mother      Migraines Mother      no longer     Depression Father      Bipolar Disorder Maternal Grandmother      Depression Maternal Grandmother      Hypertension Maternal Grandmother      Bipolar Disorder Paternal Grandmother      Migraines Brother            Reviewed and updated as needed this visit by clinical staff  Tobacco  Allergies  Meds  Med Hx  Surg Hx  Fam Hx  Soc Hx      Reviewed and updated as needed this visit by Provider  Allergies  Meds         ROS:  CONSTITUTIONAL:NEGATIVE for fever, chills, change in weight  INTEGUMENTARY/SKIN: NEGATIVE for worrisome rashes  GI: NEGATIVE for nausea, abdominal pain, heartburn, or change in bowel habits  : as above    OBJECTIVE:     /74  Pulse 97  Resp 19  Ht 5' 2\" (1.575 m)  Wt 148 lb (67.1 kg)  SpO2 98%  Breastfeeding? No  BMI 27.07 kg/m2  Body mass index is 27.07 kg/(m^2).  GENERAL: healthy, alert and no distress  ABDOMEN: soft, nontender, no hepatosplenomegaly, no masses and bowel sounds normal   (female): vaginal mucosa mildly erythematous, minimal discharge, moist, well rugated and normal cervix, adnexae, and uterus without masses.  Very tender on exam of vaginal tissue; no ulcerations noted  SKIN: no suspicious lesions or rashes  PSYCH: mentation appears normal, affect normal/bright    Diagnostic Test Results:  Results for orders placed or performed in visit on 06/29/18 (from the past 24 hour(s))   Wet prep   Result Value Ref Range    Specimen Description Vagina     Wet Prep Few  WBC'S seen       Wet Prep Clue cells seen (A)     Wet Prep No Trichomonas seen     Wet Prep No yeast seen        ASSESSMENT/PLAN:     Patient Instructions     Bacterial infection present.  Complete antibiotic course.  Keep hydrated.   Yogurt or probiotics advised.  Follow up as needed.          Teresa Song MD  [facility] [place]    "

## 2018-07-10 ENCOUNTER — TELEPHONE (OUTPATIENT)
Dept: BEHAVIORAL HEALTH | Facility: OTHER | Age: 28
End: 2018-07-10

## 2018-07-10 NOTE — TELEPHONE ENCOUNTER
"[facility]  @FLOW([id number])@      Community Health Worker Note      Patient: Myrna Martinez  Date: July 10, 2018  Preferred Name: Myrna    Previous PHQ-9:   PHQ-9 SCORE 6/12/2018 6/18/2018 6/25/2018   Total Score 13 11 7     Previous NICHOLE-7:   NICHOLE-7 SCORE 6/12/2018 6/18/2018 6/25/2018   Total Score 16 12 15     RAMO LEVEL:  RAMO Score (Last Two) 11/16/2017 4/5/2018   RAMO Raw Score 30 29   Activation Score 56 52.9   RAMO Level 3 2       Preferred Contact: @MARION([id number])@  Type of Contact Today: Phone call (patient / identified key support person reached)      Data: (subjective / Objective):  Patient Goals Areas: @FLOW([id number])@  Patient Stated Goals: @FLOW([id number])@  Recent ED/IP Admission or Discharge?   None  Recent ED/IP Admission or Discharge?   None    Patient Goals:  Goal Areas: Mental Health  Patient stated goals: \" I want to not let things cloud my vision so easily\"      [facility] Core Service Provided:  Care Coordination: provided care management services/referrals necessary to ensure patient and their identified supports have access to medical, behavioral health, pharmacology and recovery support services.  Ensured that patient's care is integrated across all settings and services.   Individual and Family Support: aimed to help clients reduce barriers to achieving goals, increase health literacy and knowledge about chronic condition(s), increase self-efficacy skills, and improve health outcomes    Current Stressors / Issues / Care Plan Objective Addressed Today:  None discussed    Intervention:  Motivational Interviewing: Expressed Empathy/Understanding and Open-ended questions   Target Behavior(s): n/a    Assessment: (Progress on Goals / Homework):  CHW texted patient to see about rescheduling cancelled appointments with [facility]. Patient said she is doing well, but [place] so she is not interested in rescheduling appointments right now. CHW encouraged patient to " contact [facility] to reschedule when she is back in the area.     Plan: (Homework, other):  Patient was encouraged to continue to seek condition-related information and education.      Scheduled a Phone follow up appointment with CHW in 4 weeks     Patient has set self-identified goals and will monitor progress until the next appointment on: 08/10/18.     Maritza Lopez, Community Health Worker

## 2018-08-08 ENCOUNTER — OFFICE VISIT (OUTPATIENT)
Dept: FAMILY MEDICINE | Facility: CLINIC | Age: 28
End: 2018-08-08
Payer: COMMERCIAL

## 2018-08-08 VITALS
BODY MASS INDEX: 29.53 KG/M2 | DIASTOLIC BLOOD PRESSURE: 68 MMHG | HEIGHT: 62 IN | WEIGHT: 160.5 LBS | SYSTOLIC BLOOD PRESSURE: 118 MMHG | TEMPERATURE: 98.2 F | HEART RATE: 80 BPM

## 2018-08-08 DIAGNOSIS — F33.1 MAJOR DEPRESSIVE DISORDER, RECURRENT EPISODE, MODERATE (H): ICD-10-CM

## 2018-08-08 DIAGNOSIS — A60.09 HERPES GENITALIS IN WOMEN: ICD-10-CM

## 2018-08-08 DIAGNOSIS — F41.1 GAD (GENERALIZED ANXIETY DISORDER): Primary | ICD-10-CM

## 2018-08-08 DIAGNOSIS — R09.81 CHRONIC NASAL CONGESTION: ICD-10-CM

## 2018-08-08 PROCEDURE — 99214 OFFICE O/P EST MOD 30 MIN: CPT | Performed by: PHYSICIAN ASSISTANT

## 2018-08-08 RX ORDER — LORATADINE 10 MG/1
10 TABLET ORAL DAILY
Qty: 30 TABLET | Refills: 3 | Status: SHIPPED | OUTPATIENT
Start: 2018-08-08 | End: 2019-01-02

## 2018-08-08 RX ORDER — HYDROXYZINE PAMOATE 25 MG/1
25-50 CAPSULE ORAL 3 TIMES DAILY PRN
Qty: 30 CAPSULE | Refills: 1 | Status: SHIPPED | OUTPATIENT
Start: 2018-08-08 | End: 2019-01-02

## 2018-08-08 RX ORDER — FLUTICASONE PROPIONATE 50 MCG
1-2 SPRAY, SUSPENSION (ML) NASAL DAILY
Qty: 1 BOTTLE | Refills: 3 | Status: SHIPPED | OUTPATIENT
Start: 2018-08-08 | End: 2019-01-02

## 2018-08-08 RX ORDER — VALACYCLOVIR HYDROCHLORIDE 500 MG/1
500 TABLET, FILM COATED ORAL DAILY
Qty: 90 TABLET | Refills: 1 | Status: SHIPPED | OUTPATIENT
Start: 2018-08-08 | End: 2019-01-02

## 2018-08-08 NOTE — PROGRESS NOTES
"  SUBJECTIVE:   Myrna Martinez is a 28 year old female who presents to clinic today for the following health issues:    Anxiety Follow-Up    Status since last visit: Kind of hard to tell    Other associated symptoms:None    Complicating factors:   Significant life event: Yes-  Moved down from [facility] due to bad relationship.     Current substance abuse: None  Depression symptoms: Yes-  But handling things well, lots of changes.  NICHOLE-7 SCORE 6/12/2018 6/18/2018 6/25/2018   Total Score 16 12 15       NICHOLE-7      Problems taking medications regularly: Might have missed a couple times recently due to stresses    Medication side effects: none    Diet: regular (no restrictions)      Has been on a daily medication for HSV for 5 years, hasn't had an outbreak for quite some time.  Living with dad, has partial rent credit through one of her 2 jobs.  Hasn't been taking her meds for the past week or so as is too overwhelmed with getting everything else in order.  Patient is here today to Establish Care after moving down from [facility].  Would like refills on all her meds to make sure they are at local pharmacy.  Would like to discuss her nasal congestion issues.  Has seen specialists about this also.  Did just quit smoking marijuana.    Nasal congestion, can only breathe out of her R nostril  Saw ENT, allergy, has tried topical steroids.    -------------------------------------    Problem list and histories reviewed & adjusted, as indicated.  Additional history: as documented    Reviewed and updated as needed this visit by clinical staff       Reviewed and updated as needed this visit by Provider         ROS:  Constitutional, HEENT, cardiovascular, pulmonary, gi and gu systems are negative, except as otherwise noted.    OBJECTIVE:     /68 (BP Location: Right arm, Cuff Size: Adult Regular)  Pulse 80  Temp 98.2  F (36.8  C) (Oral)  Ht 5' 2\" (1.575 m)  Wt 160 lb 8 oz (72.8 kg)  BMI 29.36 kg/m2  Body mass index is " 29.36 kg/(m^2).  GENERAL: healthy, alert and no distress  EYES: Eyes grossly normal to inspection, PERRL and conjunctivae and sclerae normal  HENT: normal cephalic/atraumatic, ear canals and TM's normal, nose and mouth without ulcers or lesions, nasal mucosa edematous , oropharynx clear and oral mucous membranes moist  NECK: no adenopathy, no asymmetry, masses, or scars and thyroid normal to palpation  RESP: lungs clear to auscultation - no rales, rhonchi or wheezes  CV: regular rate and rhythm, normal S1 S2, no S3 or S4, no murmur, click or rub, no peripheral edema and peripheral pulses strong  PSYCH: mentation appears normal, affect normal/bright    Diagnostic Test Results:  none     ASSESSMENT/PLAN:   1. NICHOLE (generalized anxiety disorder)  2. Major depressive disorder, recurrent episode, moderate (H)  Continue on current dose of 50 mg per day as started this just recently  Phone visit in 6 weeks, consider dose adjustment.  Strongly encouraged therapy, she will consider.  Referral to Care Coordination to assist with resources.  - sertraline (ZOLOFT) 50 MG tablet; Take 1 tablet (50 mg) by mouth daily 1 tablet orally daily  Dispense: 90 tablet; Refill: 1  - hydrOXYzine (VISTARIL) 25 MG capsule; Take 1-2 capsules (25-50 mg) by mouth 3 times daily as needed for anxiety (insomnia)  Dispense: 30 capsule; Refill: 1    3. Herpes genitalis in women  Refills of below medications for stable chronic conditions.  - valACYclovir (VALTREX) 500 MG tablet; Take 1 tablet (500 mg) by mouth daily  Dispense: 90 tablet; Refill: 1    4. Chronic nasal congestion  Below medication as directed with full discussion of risks, benefits, and possible adverse effects.  - loratadine (CLARITIN) 10 MG tablet; Take 1 tablet (10 mg) by mouth daily  Dispense: 30 tablet; Refill: 3  - fluticasone (FLONASE) 50 MCG/ACT spray; Spray 1-2 sprays into both nostrils daily  Dispense: 1 Bottle; Refill: 3  - OTOLARYNGOLOGY REFERRAL    Patient Instructions    Continue on Zoloft 50 mg per day  Phone visit in 6 weeks.  Consider therapy in the near future.  I will have my  call you.  Call to schedule ENT.    EDUAR Oliveira PA-C  [facility]

## 2018-08-08 NOTE — PATIENT INSTRUCTIONS
Continue on Zoloft 50 mg per day  Phone visit in 6 weeks.  Consider therapy in the near future.  I will have my  call you.  Call to schedule ENT.    Sindhu Hatch PA-C

## 2018-08-08 NOTE — MR AVS SNAPSHOT
After Visit Summary   8/8/2018    Myrna Martinez    MRN: [id number]           Patient Information     Date Of Birth          1990        Visit Information        Provider Department      8/8/2018 12:20 PM Sindhu Hatch PA-C [facility]        Today's Diagnoses     NICHOLE (generalized anxiety disorder)    -  1    Major depressive disorder, recurrent episode, moderate (H)        Herpes genitalis in women        Chronic nasal congestion          Care Instructions    Continue on Zoloft 50 mg per day  Phone visit in 6 weeks.  Consider therapy in the near future.  I will have my  call you.  Call to schedule ENT.    Sindhu Hatch PA-C            Follow-ups after your visit        Additional Services     OTOLARYNGOLOGY REFERRAL       Your provider has referred you to: [facility] [place] [facility] [place] [phone]   [email or web address]    Please be aware that coverage of these services is subject to the terms and limitations of your health insurance plan.  Call member services at your health plan with any benefit or coverage questions.      Please bring the following with you to your appointment:    (1) Any X-Rays, CTs or MRIs which have been performed.  Contact the facility where they were done to arrange for  prior to your scheduled appointment.   (2) List of current medications  (3) This referral request   (4) Any documents/labs given to you for this referral                  Who to contact     If you have questions or need follow up information about today's clinic visit or your schedule please contact [facility] directly at [phone].  Normal or non-critical lab and imaging results will be communicated to you by MyChart, letter or phone within 4 business days after the clinic has received the results. If you do not hear from us within 7 days, please contact the clinic through MyChart or phone. If you have  "a critical or abnormal lab result, we will notify you by phone as soon as possible.  Submit refill requests through [employer] or call your pharmacy and they will forward the refill request to us. Please allow 3 business days for your refill to be completed.          Additional Information About Your Visit        [employer] Information     [employer] gives you secure access to your electronic health record. If you see a primary care provider, you can also send messages to your care team and make appointments. If you have questions, please call your primary care clinic.  If you do not have a primary care provider, please call [phone] and they will assist you.        Care EveryWhere ID     This is your Care EveryWhere ID. This could be used by other organizations to access your [place] medical records  [id number]        Your Vitals Were     Pulse Temperature Height BMI (Body Mass Index)          80 98.2  F (36.8  C) (Oral) 5' 2\" (1.575 m) 29.36 kg/m2         Blood Pressure from Last 3 Encounters:   08/08/18 118/68   06/29/18 108/74   06/20/18 106/64    Weight from Last 3 Encounters:   08/08/18 160 lb 8 oz (72.8 kg)   06/29/18 148 lb (67.1 kg)   06/20/18 154 lb (69.9 kg)              We Performed the Following     OTOLARYNGOLOGY REFERRAL          Today's Medication Changes          These changes are accurate as of 8/8/18  1:26 PM.  If you have any questions, ask your nurse or doctor.               These medicines have changed or have updated prescriptions.        Dose/Directions    hydrOXYzine 25 MG capsule   Commonly known as:  VISTARIL   This may have changed:  reasons to take this   Used for:  NICHOLE (generalized anxiety disorder)   Changed by:  Sindhu Hatch PA-C        Dose:  25-50 mg   Take 1-2 capsules (25-50 mg) by mouth 3 times daily as needed for anxiety (insomnia)   Quantity:  30 capsule   Refills:  1       sertraline 50 MG tablet   Commonly known as:  ZOLOFT   This may have changed:    - how much to " take  - how to take this  - when to take this  - additional instructions   Used for:  NICHOLE (generalized anxiety disorder)   Changed by:  Sindhu Hatch PA-C        Dose:  50 mg   Take 1 tablet (50 mg) by mouth daily 1 tablet orally daily   Quantity:  90 tablet   Refills:  1            Where to get your medicines      These medications were sent to [facility] [address] [place] [address] [address] [place] [address]  [address] [facility] [address]     Phone:  [phone]     fluticasone 50 MCG/ACT spray    hydrOXYzine 25 MG capsule    loratadine 10 MG tablet    sertraline 50 MG tablet    valACYclovir 500 MG tablet                Primary Care Provider Office Phone # Fax #    Teresa Dawson [phone] [address] [facility] [address]  [facility] [address]        Equal Access to Services     [facility]: Hadii aad ku hadasho Soomaali, waaxda luqadaha, qaybta kaalmada adeegyada, waxay idiin hayaan donovan mccrayaraarthur valencia . So [facility] [phone].    ATENCIÓN: Si habla español, tiene a ojeda disposición servicios gratuitos de asistencia lingüística. [facility] [phone].    We comply with applicable federal civil rights laws and [place] laws. We do not discriminate on the basis of race, color, national origin, age, disability, sex, sexual orientation, or gender identity.            Thank you!     Thank you for choosing [facility]  for your care. Our goal is always to provide you with excellent care. Hearing back from our patients is one way we can continue to improve our services. Please take a few minutes to complete the written survey that you may receive in the mail after your visit with us. Thank you!             Your Updated Medication List - Protect others around you: Learn how to safely use, store and throw away your medicines at www.disposemymeds.org.          This list is accurate as of 8/8/18  1:26 PM.  Always use your most recent med list.                    Brand Name Dispense Instructions for use Diagnosis    ADVIL PO      Take 400 mg by mouth every 6 hours as needed for moderate pain        etonogestrel 68 MG Impl    IMPLANON/NEXPLANON     1 each (68 mg) by Subdermal route continuous    Encounter for initial prescription of implantable subdermal contraceptive, Family planning       fluticasone 50 MCG/ACT spray    FLONASE    1 Bottle    Spray 1-2 sprays into both nostrils daily    Chronic nasal congestion       hydrOXYzine 25 MG capsule    VISTARIL    30 capsule    Take 1-2 capsules (25-50 mg) by mouth 3 times daily as needed for anxiety (insomnia)    NICHOLE (generalized anxiety disorder)       loratadine 10 MG tablet    CLARITIN    30 tablet    Take 1 tablet (10 mg) by mouth daily    Chronic nasal congestion       sertraline 50 MG tablet    ZOLOFT    90 tablet    Take 1 tablet (50 mg) by mouth daily 1 tablet orally daily    NICHOLE (generalized anxiety disorder)       valACYclovir 500 MG tablet    VALTREX    90 tablet    Take 1 tablet (500 mg) by mouth daily    Herpes genitalis in women

## 2018-08-23 ENCOUNTER — PATIENT OUTREACH (OUTPATIENT)
Dept: CARE COORDINATION | Facility: CLINIC | Age: 28
End: 2018-08-23
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
## 2019-01-02 ENCOUNTER — OFFICE VISIT (OUTPATIENT)
Dept: FAMILY MEDICINE | Facility: CLINIC | Age: 29
End: 2019-01-02
Payer: COMMERCIAL

## 2019-01-02 VITALS
DIASTOLIC BLOOD PRESSURE: 60 MMHG | TEMPERATURE: 97.9 F | BODY MASS INDEX: 30.91 KG/M2 | HEIGHT: 62 IN | SYSTOLIC BLOOD PRESSURE: 108 MMHG | WEIGHT: 168 LBS | HEART RATE: 80 BPM

## 2019-01-02 DIAGNOSIS — J34.3 NASAL TURBINATE HYPERTROPHY: ICD-10-CM

## 2019-01-02 DIAGNOSIS — F41.1 GAD (GENERALIZED ANXIETY DISORDER): ICD-10-CM

## 2019-01-02 DIAGNOSIS — B07.9 VIRAL WARTS, UNSPECIFIED TYPE: Primary | ICD-10-CM

## 2019-01-02 DIAGNOSIS — R09.81 CHRONIC NASAL CONGESTION: ICD-10-CM

## 2019-01-02 DIAGNOSIS — A60.09 HERPES GENITALIS IN WOMEN: ICD-10-CM

## 2019-01-02 DIAGNOSIS — F33.1 MAJOR DEPRESSIVE DISORDER, RECURRENT EPISODE, MODERATE (H): ICD-10-CM

## 2019-01-02 LAB
HGB BLD-MCNC: 13.5 G/DL (ref 11.7–15.7)
TSH SERPL DL<=0.005 MIU/L-ACNC: 1.3 MU/L (ref 0.4–4)

## 2019-01-02 PROCEDURE — 84443 ASSAY THYROID STIM HORMONE: CPT | Performed by: PHYSICIAN ASSISTANT

## 2019-01-02 PROCEDURE — 17110 DESTRUCTION B9 LES UP TO 14: CPT | Performed by: PHYSICIAN ASSISTANT

## 2019-01-02 PROCEDURE — 85018 HEMOGLOBIN: CPT | Performed by: PHYSICIAN ASSISTANT

## 2019-01-02 PROCEDURE — 36415 COLL VENOUS BLD VENIPUNCTURE: CPT | Performed by: PHYSICIAN ASSISTANT

## 2019-01-02 PROCEDURE — 99214 OFFICE O/P EST MOD 30 MIN: CPT | Mod: 25 | Performed by: PHYSICIAN ASSISTANT

## 2019-01-02 RX ORDER — FLUTICASONE PROPIONATE 50 MCG
1-2 SPRAY, SUSPENSION (ML) NASAL DAILY
Qty: 1 BOTTLE | Refills: 3 | Status: SHIPPED | OUTPATIENT
Start: 2019-01-02 | End: 2021-11-30

## 2019-01-02 RX ORDER — VALACYCLOVIR HYDROCHLORIDE 500 MG/1
500 TABLET, FILM COATED ORAL DAILY
Qty: 90 TABLET | Refills: 1 | Status: SHIPPED | OUTPATIENT
Start: 2019-01-02 | End: 2022-04-04

## 2019-01-02 RX ORDER — HYDROXYZINE PAMOATE 25 MG/1
25-50 CAPSULE ORAL 3 TIMES DAILY PRN
Qty: 30 CAPSULE | Refills: 1 | Status: SHIPPED | OUTPATIENT
Start: 2019-01-02 | End: 2021-11-30

## 2019-01-02 RX ORDER — LORATADINE 10 MG/1
10 TABLET ORAL DAILY
Qty: 30 TABLET | Refills: 3 | Status: SHIPPED | OUTPATIENT
Start: 2019-01-02 | End: 2021-11-30

## 2019-01-02 ASSESSMENT — ANXIETY QUESTIONNAIRES
5. BEING SO RESTLESS THAT IT IS HARD TO SIT STILL: SEVERAL DAYS
1. FEELING NERVOUS, ANXIOUS, OR ON EDGE: MORE THAN HALF THE DAYS
3. WORRYING TOO MUCH ABOUT DIFFERENT THINGS: MORE THAN HALF THE DAYS
IF YOU CHECKED OFF ANY PROBLEMS ON THIS QUESTIONNAIRE, HOW DIFFICULT HAVE THESE PROBLEMS MADE IT FOR YOU TO DO YOUR WORK, TAKE CARE OF THINGS AT HOME, OR GET ALONG WITH OTHER PEOPLE: SOMEWHAT DIFFICULT
7. FEELING AFRAID AS IF SOMETHING AWFUL MIGHT HAPPEN: MORE THAN HALF THE DAYS
2. NOT BEING ABLE TO STOP OR CONTROL WORRYING: MORE THAN HALF THE DAYS
GAD7 TOTAL SCORE: 12
6. BECOMING EASILY ANNOYED OR IRRITABLE: SEVERAL DAYS

## 2019-01-02 ASSESSMENT — PATIENT HEALTH QUESTIONNAIRE - PHQ9
SUM OF ALL RESPONSES TO PHQ QUESTIONS 1-9: 7
5. POOR APPETITE OR OVEREATING: MORE THAN HALF THE DAYS

## 2019-01-02 ASSESSMENT — MIFFLIN-ST. JEOR: SCORE: 1455.29

## 2019-01-02 NOTE — PATIENT INSTRUCTIONS
Sindhu or her team will be in touch with you with results.  Call to schedule consult with ENT.  Monitor birth control, weight for now.    Sindhu Hatch PA-C

## 2019-01-02 NOTE — PROGRESS NOTES
"  SUBJECTIVE:   Myrna Martinez is a 28 year old female who presents to clinic today for the following health issues:    WART(S)  Onset: unsure, at least 6 months    Description:   Location: left side of face near nose  Number of warts: 1  Painful: YES    Accompanying Signs & Symptoms:  Signs of infection: no    History:   History of trauma: no  Prior warts: no    Therapies Tried and outcome: none    Discuss implant - she is wondering if the birth control is making it harder for her to lose weight?  This is her 3rd Nexplanon.  Has had increase in stress since last summer.     Her and her son found a place to live, she now has a couple of jobs.  Currently in middle of custody case with her 4 yr old son.  Has not started to receive child support yet.  4 year old son is in  5 days per week.  Dad takes him Friday through Sunday, so she gets limited time with him.  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  Does \"[employer]\" for extra cash.  Is trying to eat better, lose weight as is not happy with herself physically.      Weight has gone up with Nexplanon. Removed in 12/27, then replaced in 5/2018. This is the 3rd one that she has had.    Has been poorly compliant with taking her medications.    Mood has been okay, much less stressed. Down because of current situation but does have positive outlook. Financial stress.    Still having nasal congestion. Has been using Flonase regularly with minimal relief.  Has seen ENT in the past. Was tested for allergies, nothing significant. Surgery was mentioned for nasal turbinate hypertrophy.      -------------------------------------    Problem list and histories reviewed & adjusted, as indicated.  Additional history: as documented    Reviewed and updated as needed this visit by clinical staff       Reviewed and updated as needed this visit by Provider         ROS:  Constitutional, HEENT, cardiovascular, " "pulmonary, gi and gu systems are negative, except as otherwise noted.    OBJECTIVE:     /60 (Cuff Size: Adult Large)   Pulse 80   Temp 97.9  F (36.6  C) (Oral)   Ht 1.575 m (5' 2\")   Wt 76.2 kg (168 lb)   BMI 30.73 kg/m    Body mass index is 30.73 kg/m .  GENERAL: healthy, alert and no distress  NECK: no adenopathy, no asymmetry, masses, or scars and thyroid normal to palpation  RESP: lungs clear to auscultation - no rales, rhonchi or wheezes  CV: regular rate and rhythm, normal S1 S2, no S3 or S4, no murmur, click or rub, no peripheral edema and peripheral pulses strong  SKIN: R medial mid cheek with 1 cm verrucous papule    Diagnostic Test Results:  none     ASSESSMENT/PLAN:   1. Viral warts, unspecified type  The treatments, side effects and failure rates are discussed.  Liquid nitrogen was applied to each wart.  The expected skin reaction including erythema, pain, scabbing, blistering and hypopigmented scar formation was discussed.  See at intervals until warts resolved.  - DESTRUCT BENIGN LESION, UP TO 14    2. NICHOLE (generalized anxiety disorder)  3. Major depressive disorder, recurrent episode, moderate (H)  Stable, overall well controlled  Stress with personal stressors but has a positive outlook.  Has had some fatigue, weight gain-will check below labs.  - hydrOXYzine (VISTARIL) 25 MG capsule; Take 1-2 capsules (25-50 mg) by mouth 3 times daily as needed for anxiety (insomnia)  Dispense: 30 capsule; Refill: 1  - sertraline (ZOLOFT) 50 MG tablet; Take 1 tablet (50 mg) by mouth daily 1 tablet orally daily  Dispense: 90 tablet; Refill: 1  - sertraline (ZOLOFT) 50 MG tablet; Take 1 tablet (50 mg) by mouth daily 1 tablet orally daily  Dispense: 90 tablet; Refill: 1  - TSH with free T4 reflex  - Hemoglobin    4. Chronic nasal congestion  5. Nasal turbinate hypertrophy  Continue below medications.  Referral to ENT provided so that she can establish care and discuss treatment options with new provided in " [facility].  - OTOLARYNGOLOGY REFERRAL  - loratadine (CLARITIN) 10 MG tablet; Take 1 tablet (10 mg) by mouth daily  Dispense: 30 tablet; Refill: 3  - fluticasone (FLONASE) 50 MCG/ACT nasal spray; Spray 1-2 sprays into both nostrils daily  Dispense: 1 Bottle; Refill: 3    6. Herpes genitalis in women  Refills of below medications for stable chronic conditions.  - valACYclovir (VALTREX) 500 MG tablet; Take 1 tablet (500 mg) by mouth daily  Dispense: 90 tablet; Refill: 1    Patient Instructions   Sindhu or her team will be in touch with you with results.  Call to schedule consult with ENT.  Monitor birth control, weight for now.    EDUAR Oliveira PA-C  [facility]

## 2019-01-03 ASSESSMENT — ANXIETY QUESTIONNAIRES: GAD7 TOTAL SCORE: 12

## 2019-01-30 ENCOUNTER — TELEPHONE (OUTPATIENT)
Dept: FAMILY MEDICINE | Facility: CLINIC | Age: 29
End: 2019-01-30

## 2019-01-30 NOTE — TELEPHONE ENCOUNTER
Scheduled patient at 4:40 on 2/4 with Sindhu to discuss her anxiety. Left a detailed message on patient's voicemail.  Daysi Chauhan RN

## 2019-01-30 NOTE — TELEPHONE ENCOUNTER
Reason for Call:  Appointment, Requested Provider:  Sindhu Hatch PA-C    PCP: Sindhu Hatch    Reason for visit: Anxiety, mental health concerns    Duration of symptoms: Ongoing    Have you been treated for this in the past? No    Additional comments: Patient is bringing her son in for an appointment with Sindhu Hatch on 2/4/19 at 3 pm. She would like to know if there is any way she could see her as well just to give her recommendations for a therapist or psychiatrist. She stated she is struggling with severe anxiety right now. She asked for a call back from the care team.    Can we leave a detailed message on this number? YES    Phone number patient can be reached at: Home number on file [phone] (home)    Best Time: Anytime    Call taken on 1/30/2019 at 10:04 AM by Ailin Mccallum

## 2019-02-04 ENCOUNTER — OFFICE VISIT (OUTPATIENT)
Dept: FAMILY MEDICINE | Facility: CLINIC | Age: 29
End: 2019-02-04
Payer: COMMERCIAL

## 2019-02-04 VITALS
DIASTOLIC BLOOD PRESSURE: 64 MMHG | HEART RATE: 84 BPM | WEIGHT: 164 LBS | SYSTOLIC BLOOD PRESSURE: 104 MMHG | BODY MASS INDEX: 30.18 KG/M2 | HEIGHT: 62 IN | TEMPERATURE: 98.6 F

## 2019-02-04 DIAGNOSIS — L70.9 ACNE, UNSPECIFIED ACNE TYPE: ICD-10-CM

## 2019-02-04 DIAGNOSIS — F41.1 GAD (GENERALIZED ANXIETY DISORDER): Primary | ICD-10-CM

## 2019-02-04 DIAGNOSIS — L73.9 FOLLICULITIS: ICD-10-CM

## 2019-02-04 PROCEDURE — 99214 OFFICE O/P EST MOD 30 MIN: CPT | Performed by: PHYSICIAN ASSISTANT

## 2019-02-04 RX ORDER — SERTRALINE HYDROCHLORIDE 100 MG/1
100 TABLET, FILM COATED ORAL DAILY
Qty: 90 TABLET | Refills: 3 | Status: SHIPPED | OUTPATIENT
Start: 2019-02-04 | End: 2020-02-04

## 2019-02-04 RX ORDER — TRETINOIN 0.25 MG/G
GEL TOPICAL AT BEDTIME
Qty: 20 G | Refills: 1 | Status: SHIPPED | OUTPATIENT
Start: 2019-02-04 | End: 2020-02-04

## 2019-02-04 ASSESSMENT — ANXIETY QUESTIONNAIRES
IF YOU CHECKED OFF ANY PROBLEMS ON THIS QUESTIONNAIRE, HOW DIFFICULT HAVE THESE PROBLEMS MADE IT FOR YOU TO DO YOUR WORK, TAKE CARE OF THINGS AT HOME, OR GET ALONG WITH OTHER PEOPLE: VERY DIFFICULT
1. FEELING NERVOUS, ANXIOUS, OR ON EDGE: MORE THAN HALF THE DAYS
GAD7 TOTAL SCORE: 11
3. WORRYING TOO MUCH ABOUT DIFFERENT THINGS: MORE THAN HALF THE DAYS
6. BECOMING EASILY ANNOYED OR IRRITABLE: MORE THAN HALF THE DAYS
5. BEING SO RESTLESS THAT IT IS HARD TO SIT STILL: NOT AT ALL
2. NOT BEING ABLE TO STOP OR CONTROL WORRYING: MORE THAN HALF THE DAYS
7. FEELING AFRAID AS IF SOMETHING AWFUL MIGHT HAPPEN: SEVERAL DAYS

## 2019-02-04 ASSESSMENT — PATIENT HEALTH QUESTIONNAIRE - PHQ9
SUM OF ALL RESPONSES TO PHQ QUESTIONS 1-9: 9
5. POOR APPETITE OR OVEREATING: MORE THAN HALF THE DAYS

## 2019-02-04 ASSESSMENT — MIFFLIN-ST. JEOR: SCORE: 1437.15
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
## 2019-02-04 NOTE — PATIENT INSTRUCTIONS
Increase Zoloft to 100 mg per day  Try Retin-A to face and other spots at night.  [facility] will call you to schedule an appointment with a therapist.  Can try Mike Nolan at [facility]-[phone]    Phone visit in 3-4 weeks.    Sindhu Hatch PA-C

## 2019-02-05 ASSESSMENT — ANXIETY QUESTIONNAIRES: GAD7 TOTAL SCORE: 11

## 2019-07-16 ENCOUNTER — ALLIED HEALTH/NURSE VISIT (OUTPATIENT)
Dept: NURSING | Facility: CLINIC | Age: 29
End: 2019-07-16
Payer: COMMERCIAL

## 2019-07-16 DIAGNOSIS — Z11.1 SCREENING EXAMINATION FOR PULMONARY TUBERCULOSIS: Primary | ICD-10-CM

## 2019-07-16 PROCEDURE — 99207 ZZC NO CHARGE NURSE ONLY: CPT

## 2019-07-16 PROCEDURE — 86580 TB INTRADERMAL TEST: CPT

## 2019-07-16 NOTE — PROGRESS NOTES

## 2019-07-18 ENCOUNTER — ALLIED HEALTH/NURSE VISIT (OUTPATIENT)
Dept: NURSING | Facility: CLINIC | Age: 29
End: 2019-07-18
Payer: COMMERCIAL

## 2019-07-18 DIAGNOSIS — Z11.1 SCREENING EXAMINATION FOR PULMONARY TUBERCULOSIS: Primary | ICD-10-CM

## 2019-07-18 LAB
PPDINDURATION: 0 MM (ref 0–5)
PPDREDNESS: 0 MM

## 2019-07-18 PROCEDURE — 99207 ZZC NO CHARGE NURSE ONLY: CPT

## 2019-07-18 NOTE — PROGRESS NOTES
Mantoux results: No induration.  No swelling.  No redness.    Mantoux result:  Lab Results   Component Value Date    PPDREDNESS 0 07/18/2019    PPDINDURATIO 0 07/18/2019     Is induration greater than 5mm?  No

## 2019-07-22 ENCOUNTER — TELEPHONE (OUTPATIENT)
Dept: FAMILY MEDICINE | Facility: CLINIC | Age: 29
End: 2019-07-22

## 2019-07-22 NOTE — TELEPHONE ENCOUNTER
Panel Management Review      Patient has the following on her problem list:     Depression / Dysthymia review    Measure:  Needs PHQ-9 score of 4 or less during index window.  Administer PHQ-9 and if score is 5 or more, send encounter to provider for next steps.    12 month remission PHQ-9 follow up date range: 4/13-8/11/19      PHQ-9 SCORE 6/25/2018 1/2/2019 2/4/2019   PHQ-9 Total Score 7 7 9       If PHQ-9 recheck is 5 or more, route to provider for next steps.    Patient is due for:  PHQ9      Composite cancer screening  Chart review shows that this patient is due/due soon for the following None  Summary:    Patient is due/failing the following:   PHQ9 and PHYSICAL    Action needed:   Patient needs office visit for preventive care. and Patient needs to do PHQ9.    Type of outreach:    Routed to care team for outreach    Questions for provider review:    None                                                                                                                                    Valery Martinez        Chart routed to Care Team .

## 2019-07-24 NOTE — TELEPHONE ENCOUNTER
Panel Management Review      Patient has the following on her problem list:     Depression / Dysthymia review    Measure:  Needs PHQ-9 score of 4 or less during index window.  Administer PHQ-9 and if score is 5 or more, send encounter to provider for next steps.    5 - 7 month window range: Due by 8/11    PHQ-9 SCORE 6/25/2018 1/2/2019 2/4/2019   PHQ-9 Total Score 7 7 9       If PHQ-9 recheck is 5 or more, route to provider for next steps.    Patient is due for:  PHQ9      Composite cancer screening  Chart review shows that this patient is due/due soon for the following None  Summary:    Patient is due/failing the following:   PHQ9 and PHYSICAL    Action needed:   Patient needs office visit for physical. and Patient needs to do PHQ9.    Type of outreach:    Sent XO1 message.    Questions for provider review:    None                                                                                                                                    Christina Dixon MA       Chart routed to Care Team .

## 2020-01-08 ENCOUNTER — COMMUNICATION - HEALTHEAST (OUTPATIENT)
Dept: TELEHEALTH | Facility: CLINIC | Age: 30
End: 2020-01-08

## 2020-01-08 ENCOUNTER — OFFICE VISIT - HEALTHEAST (OUTPATIENT)
Dept: FAMILY MEDICINE | Facility: CLINIC | Age: 30
End: 2020-01-08

## 2020-01-08 DIAGNOSIS — N89.8 VAGINAL DISCHARGE: ICD-10-CM

## 2020-01-08 DIAGNOSIS — B30.9 VIRAL CONJUNCTIVITIS OF RIGHT EYE: ICD-10-CM

## 2020-01-08 LAB
CLUE CELLS: NORMAL
TRICHOMONAS, WET PREP: NORMAL
YEAST, WET PREP: NORMAL

## 2020-01-09 LAB
C TRACH DNA SPEC QL PROBE+SIG AMP: NEGATIVE
N GONORRHOEA DNA SPEC QL NAA+PROBE: NEGATIVE

## 2020-01-13 ENCOUNTER — COMMUNICATION - HEALTHEAST (OUTPATIENT)
Dept: FAMILY MEDICINE | Facility: CLINIC | Age: 30
End: 2020-01-13

## 2020-03-02 ENCOUNTER — HEALTH MAINTENANCE LETTER (OUTPATIENT)
Age: 30
End: 2020-03-02

## 2020-03-11 ENCOUNTER — OFFICE VISIT - HEALTHEAST (OUTPATIENT)
Dept: FAMILY MEDICINE | Facility: CLINIC | Age: 30
End: 2020-03-11

## 2020-03-11 ENCOUNTER — RECORDS - HEALTHEAST (OUTPATIENT)
Dept: ADMINISTRATIVE | Facility: OTHER | Age: 30
End: 2020-03-11

## 2020-03-11 DIAGNOSIS — N93.9 ABNORMAL VAGINAL BLEEDING: ICD-10-CM

## 2020-03-11 DIAGNOSIS — N63.10 LUMP OF RIGHT BREAST: ICD-10-CM

## 2020-03-11 DIAGNOSIS — Z80.3 FAMILY HISTORY OF MALIGNANT NEOPLASM OF BREAST: ICD-10-CM

## 2020-03-11 LAB — HCG UR QL: NEGATIVE

## 2020-03-12 ENCOUNTER — OFFICE VISIT - HEALTHEAST (OUTPATIENT)
Dept: FAMILY MEDICINE | Facility: CLINIC | Age: 30
End: 2020-03-12

## 2020-03-12 DIAGNOSIS — N63.10 BREAST MASS, RIGHT: ICD-10-CM

## 2020-03-12 ASSESSMENT — MIFFLIN-ST. JEOR: SCORE: 1345.94

## 2020-03-17 ENCOUNTER — COMMUNICATION - HEALTHEAST (OUTPATIENT)
Dept: FAMILY MEDICINE | Facility: CLINIC | Age: 30
End: 2020-03-17

## 2020-03-26 ENCOUNTER — COMMUNICATION - HEALTHEAST (OUTPATIENT)
Dept: FAMILY MEDICINE | Facility: CLINIC | Age: 30
End: 2020-03-26

## 2020-03-31 ENCOUNTER — HOSPITAL ENCOUNTER (OUTPATIENT)
Dept: ULTRASOUND IMAGING | Facility: CLINIC | Age: 30
Discharge: HOME | End: 2020-03-31

## 2020-03-31 DIAGNOSIS — N63.10 BREAST MASS, RIGHT: ICD-10-CM

## 2020-07-06 ENCOUNTER — OFFICE VISIT - HEALTHEAST (OUTPATIENT)
Dept: FAMILY MEDICINE | Facility: CLINIC | Age: 30
End: 2020-07-06

## 2020-07-06 DIAGNOSIS — F41.9 ANXIETY AND DEPRESSION: ICD-10-CM

## 2020-07-06 DIAGNOSIS — R53.83 FATIGUE, UNSPECIFIED TYPE: ICD-10-CM

## 2020-07-06 DIAGNOSIS — Z97.5 BREAKTHROUGH BLEEDING ON NEXPLANON: ICD-10-CM

## 2020-07-06 DIAGNOSIS — F32.A ANXIETY AND DEPRESSION: ICD-10-CM

## 2020-07-06 DIAGNOSIS — Z11.3 SCREEN FOR STD (SEXUALLY TRANSMITTED DISEASE): ICD-10-CM

## 2020-07-06 DIAGNOSIS — M67.432 GANGLION CYST OF WRIST, LEFT: ICD-10-CM

## 2020-07-06 DIAGNOSIS — N92.1 BREAKTHROUGH BLEEDING ON NEXPLANON: ICD-10-CM

## 2020-07-06 DIAGNOSIS — A60.00 GENITAL HERPES SIMPLEX, UNSPECIFIED SITE: ICD-10-CM

## 2020-07-06 DIAGNOSIS — Z76.89 ENCOUNTER TO ESTABLISH CARE: ICD-10-CM

## 2020-07-06 DIAGNOSIS — R41.840 ATTENTION AND CONCENTRATION DEFICIT: ICD-10-CM

## 2020-07-06 LAB
ALBUMIN SERPL-MCNC: 4.4 G/DL (ref 3.5–5)
ALP SERPL-CCNC: 70 U/L (ref 45–120)
ALT SERPL W P-5'-P-CCNC: 20 U/L (ref 0–45)
ANION GAP SERPL CALCULATED.3IONS-SCNC: 12 MMOL/L (ref 5–18)
AST SERPL W P-5'-P-CCNC: 18 U/L (ref 0–40)
BILIRUB SERPL-MCNC: 0.7 MG/DL (ref 0–1)
BUN SERPL-MCNC: 11 MG/DL (ref 8–22)
CALCIUM SERPL-MCNC: 9.5 MG/DL (ref 8.5–10.5)
CHLORIDE BLD-SCNC: 104 MMOL/L (ref 98–107)
CO2 SERPL-SCNC: 24 MMOL/L (ref 22–31)
CREAT SERPL-MCNC: 0.75 MG/DL (ref 0.6–1.1)
ERYTHROCYTE [DISTWIDTH] IN BLOOD BY AUTOMATED COUNT: 10.6 % (ref 11–14.5)
GFR SERPL CREATININE-BSD FRML MDRD: >60 ML/MIN/1.73M2
GLUCOSE BLD-MCNC: 73 MG/DL (ref 70–125)
HCT VFR BLD AUTO: 41.6 % (ref 35–47)
HGB BLD-MCNC: 13.9 G/DL (ref 12–16)
HIV 1+2 AB+HIV1 P24 AG SERPL QL IA: NEGATIVE
MCH RBC QN AUTO: 32.1 PG (ref 27–34)
MCHC RBC AUTO-ENTMCNC: 33.5 G/DL (ref 32–36)
MCV RBC AUTO: 96 FL (ref 80–100)
PLATELET # BLD AUTO: 285 THOU/UL (ref 140–440)
PMV BLD AUTO: 7.3 FL (ref 7–10)
POTASSIUM BLD-SCNC: 3.8 MMOL/L (ref 3.5–5)
PROT SERPL-MCNC: 7.4 G/DL (ref 6–8)
RBC # BLD AUTO: 4.34 MILL/UL (ref 3.8–5.4)
SODIUM SERPL-SCNC: 140 MMOL/L (ref 136–145)
TSH SERPL DL<=0.005 MIU/L-ACNC: 0.36 UIU/ML (ref 0.3–5)
WBC: 10.6 THOU/UL (ref 4–11)

## 2020-07-06 ASSESSMENT — ANXIETY QUESTIONNAIRES
3. WORRYING TOO MUCH ABOUT DIFFERENT THINGS: NEARLY EVERY DAY
5. BEING SO RESTLESS THAT IT IS HARD TO SIT STILL: NEARLY EVERY DAY
1. FEELING NERVOUS, ANXIOUS, OR ON EDGE: NEARLY EVERY DAY
7. FEELING AFRAID AS IF SOMETHING AWFUL MIGHT HAPPEN: NEARLY EVERY DAY
IF YOU CHECKED OFF ANY PROBLEMS ON THIS QUESTIONNAIRE, HOW DIFFICULT HAVE THESE PROBLEMS MADE IT FOR YOU TO DO YOUR WORK, TAKE CARE OF THINGS AT HOME, OR GET ALONG WITH OTHER PEOPLE: EXTREMELY DIFFICULT
GAD7 TOTAL SCORE: 21
6. BECOMING EASILY ANNOYED OR IRRITABLE: NEARLY EVERY DAY
2. NOT BEING ABLE TO STOP OR CONTROL WORRYING: NEARLY EVERY DAY
4. TROUBLE RELAXING: NEARLY EVERY DAY

## 2020-07-06 ASSESSMENT — MIFFLIN-ST. JEOR: SCORE: 1375.43

## 2020-07-06 ASSESSMENT — PATIENT HEALTH QUESTIONNAIRE - PHQ9: SUM OF ALL RESPONSES TO PHQ QUESTIONS 1-9: 18

## 2020-07-07 ENCOUNTER — COMMUNICATION - HEALTHEAST (OUTPATIENT)
Dept: FAMILY MEDICINE | Facility: CLINIC | Age: 30
End: 2020-07-07

## 2020-07-07 LAB
25(OH)D3 SERPL-MCNC: 35 NG/ML (ref 30–80)
C TRACH DNA SPEC QL PROBE+SIG AMP: NEGATIVE
HCV AB SERPL QL IA: NEGATIVE
N GONORRHOEA DNA SPEC QL NAA+PROBE: NEGATIVE
T PALLIDUM AB SER QL: NEGATIVE

## 2020-07-29 ENCOUNTER — COMMUNICATION - HEALTHEAST (OUTPATIENT)
Dept: FAMILY MEDICINE | Facility: CLINIC | Age: 30
End: 2020-07-29

## 2020-07-30 ENCOUNTER — OFFICE VISIT - HEALTHEAST (OUTPATIENT)
Dept: FAMILY MEDICINE | Facility: CLINIC | Age: 30
End: 2020-07-30

## 2020-07-30 DIAGNOSIS — B35.4 TINEA CORPORIS: ICD-10-CM

## 2020-09-02 ENCOUNTER — OFFICE VISIT - HEALTHEAST (OUTPATIENT)
Dept: FAMILY MEDICINE | Facility: CLINIC | Age: 30
End: 2020-09-02

## 2020-09-02 DIAGNOSIS — R09.81 SINUS CONGESTION: ICD-10-CM

## 2020-09-02 DIAGNOSIS — R21 RASH: ICD-10-CM

## 2020-09-04 ENCOUNTER — COMMUNICATION - HEALTHEAST (OUTPATIENT)
Dept: FAMILY MEDICINE | Facility: CLINIC | Age: 30
End: 2020-09-04

## 2020-09-04 ENCOUNTER — AMBULATORY - HEALTHEAST (OUTPATIENT)
Dept: FAMILY MEDICINE | Facility: CLINIC | Age: 30
End: 2020-09-04

## 2020-09-25 ENCOUNTER — COMMUNICATION - HEALTHEAST (OUTPATIENT)
Dept: BEHAVIORAL HEALTH | Facility: CLINIC | Age: 30
End: 2020-09-25

## 2020-09-25 ENCOUNTER — OFFICE VISIT - HEALTHEAST (OUTPATIENT)
Dept: BEHAVIORAL HEALTH | Facility: CLINIC | Age: 30
End: 2020-09-25

## 2020-09-25 DIAGNOSIS — F32.A ANXIETY AND DEPRESSION: ICD-10-CM

## 2020-09-25 DIAGNOSIS — R41.840 ATTENTION AND CONCENTRATION DEFICIT: ICD-10-CM

## 2020-09-25 DIAGNOSIS — F41.9 ANXIETY AND DEPRESSION: ICD-10-CM

## 2020-09-25 ASSESSMENT — ANXIETY QUESTIONNAIRES
GAD7 TOTAL SCORE: 17
1. FEELING NERVOUS, ANXIOUS, OR ON EDGE: NEARLY EVERY DAY
5. BEING SO RESTLESS THAT IT IS HARD TO SIT STILL: SEVERAL DAYS
2. NOT BEING ABLE TO STOP OR CONTROL WORRYING: NEARLY EVERY DAY
4. TROUBLE RELAXING: MORE THAN HALF THE DAYS
6. BECOMING EASILY ANNOYED OR IRRITABLE: NEARLY EVERY DAY
3. WORRYING TOO MUCH ABOUT DIFFERENT THINGS: NEARLY EVERY DAY
7. FEELING AFRAID AS IF SOMETHING AWFUL MIGHT HAPPEN: MORE THAN HALF THE DAYS
IF YOU CHECKED OFF ANY PROBLEMS ON THIS QUESTIONNAIRE, HOW DIFFICULT HAVE THESE PROBLEMS MADE IT FOR YOU TO DO YOUR WORK, TAKE CARE OF THINGS AT HOME, OR GET ALONG WITH OTHER PEOPLE: EXTREMELY DIFFICULT

## 2020-09-25 ASSESSMENT — PATIENT HEALTH QUESTIONNAIRE - PHQ9: SUM OF ALL RESPONSES TO PHQ QUESTIONS 1-9: 19

## 2020-09-28 ENCOUNTER — OFFICE VISIT - HEALTHEAST (OUTPATIENT)
Dept: BEHAVIORAL HEALTH | Facility: CLINIC | Age: 30
End: 2020-09-28

## 2020-09-28 DIAGNOSIS — F32.2 CURRENT SEVERE EPISODE OF MAJOR DEPRESSIVE DISORDER WITHOUT PSYCHOTIC FEATURES WITHOUT PRIOR EPISODE (H): ICD-10-CM

## 2020-09-28 ASSESSMENT — ANXIETY QUESTIONNAIRES
2. NOT BEING ABLE TO STOP OR CONTROL WORRYING: NEARLY EVERY DAY
1. FEELING NERVOUS, ANXIOUS, OR ON EDGE: NEARLY EVERY DAY
7. FEELING AFRAID AS IF SOMETHING AWFUL MIGHT HAPPEN: MORE THAN HALF THE DAYS
4. TROUBLE RELAXING: MORE THAN HALF THE DAYS
GAD7 TOTAL SCORE: 18
3. WORRYING TOO MUCH ABOUT DIFFERENT THINGS: NEARLY EVERY DAY
IF YOU CHECKED OFF ANY PROBLEMS ON THIS QUESTIONNAIRE, HOW DIFFICULT HAVE THESE PROBLEMS MADE IT FOR YOU TO DO YOUR WORK, TAKE CARE OF THINGS AT HOME, OR GET ALONG WITH OTHER PEOPLE: EXTREMELY DIFFICULT
5. BEING SO RESTLESS THAT IT IS HARD TO SIT STILL: MORE THAN HALF THE DAYS
6. BECOMING EASILY ANNOYED OR IRRITABLE: NEARLY EVERY DAY

## 2020-09-28 ASSESSMENT — PATIENT HEALTH QUESTIONNAIRE - PHQ9: SUM OF ALL RESPONSES TO PHQ QUESTIONS 1-9: 16

## 2020-10-19 ENCOUNTER — OFFICE VISIT - HEALTHEAST (OUTPATIENT)
Dept: FAMILY MEDICINE | Facility: CLINIC | Age: 30
End: 2020-10-19

## 2020-10-19 DIAGNOSIS — N93.9 VAGINAL BLEEDING: ICD-10-CM

## 2020-11-03 ENCOUNTER — VIRTUAL VISIT (OUTPATIENT)
Dept: FAMILY MEDICINE | Facility: OTHER | Age: 30
End: 2020-11-03

## 2020-11-03 NOTE — PROGRESS NOTES
"Date: 2020 13:18:10  Clinician: Denver Le  Clinician NPI: [id number]  Patient: Myrna Martinez  Patient : 1990  Patient Address: [address] [place] [address] [id number] [id number]  Patient Phone: [phone]  Visit Protocol: URI  Patient Summary:  Myrna is a 30 year old ( : 1990 ) female who initiated a [facility] Visit for COVID-19 (Coronavirus) evaluation and screening. When asked the question \"Please sign me up to receive news, health information and promotions. \", Myrna responded \"No\".    When asked when her symptoms started, Myrna reported that she does not have any symptoms.   She denies taking antibiotic medication in the past month and having recent facial or sinus surgery in the past 60 days.    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   Myrna has had a close contact with a laboratory-confirmed COVID-19 patient in the last 14 days. She was exposed at her work. She does not know when she was exposed to the laboratory-confirmed COVID-19 patient.   Additional information about contact with COVID-19 (Coronavirus) patient as reported by the patient (free text): A relative of my clients at work tested positive for covid   Myrna is not living in the same household with the COVID-19 positive patient. She was in an enclosed space for greater than 15 minutes with the COVID-19 patient.   During the encounter, both of them were wearing masks.   Since 2019, Myrna has not been tested for COVID-19 and has not had upper respiratory infection or influenza-like illness.   Pertinent medical history  Myrna does not get yeast infections when she takes antibiotics.   " Myrna needs a return to work/school note.   Weight: 160 lbs   Myrna does not smoke or use smokeless tobacco.   She denies pregnancy and denies breastfeeding. She has menstruated in the past month.   Weight: 160 lbs    MEDICATIONS: No current medications, ALLERGIES: NKDA  Clinician Response:  Dear Myrna,   Based on your exposure to COVID-19 (coronavirus), we would like to test you for this virus.  1. Please call [phone] to schedule your visit. Explain that you were referred by [facility] to have a COVID-19 test. Be ready to share your [facility] visit ID number.   The following will serve as your written order for this COVID Test, ordered by me, for the indication of suspected COVID [Z20.828]: The test will be ordered in [employer], our electronic health record, after you are scheduled. It will show as ordered and authorized by Immanuel Amaya MD.  Order: COVID-19 (coronavirus) PCR for ASYMPTOMATIC EXPOSURE testing from [facility].   If you know you have had close contact with someone who tested positive, you should be quarantined for 14 days after this exposure. You should stay in quarantine for the14 days even if the covid test is negative, the optimal time to test after exposure is 5-7 days from the exposure  Quarantine means   What should I do?  For safety, it's very important to follow these rules. Do this for 14 days after the date you were last exposed to the virus..  Stay home and away from others. Don't go to school or anywhere else. Generally quarantine means staying home from work but there are some exceptions to this. Please contact your workplace.   No hugging, kissing or shaking hands.  Don't let anyone visit.  Cover your mouth and nose with a mask, tissue or washcloth to avoid spreading germs.  Wash your hands and face often. Use soap and water.  What are the symptoms of COVID-19?  The most common symptoms are cough, fever and trouble breathing. Less common symptoms include headache, body aches, fatigue (feeling  very tired), chills, sore throat, stuffy or runny nose, diarrhea (loose poop), loss of taste or smell, belly pain, and nausea or vomiting (feeling sick to your stomach or throwing up).  After 14 days, if you have still don't have symptoms, you likely don't have this virus.  If you develop symptoms, follow these guidelines.  If you're normally healthy: Please start another [facility] visit to report your symptoms. Go to [email or web address].  If you have a serious health problem (like cancer, heart failure, an organ transplant or kidney disease): Call your specialty clinic. Let them know that you might have COVID-19.  2. When it's time for your COVID test:  Stay at least 6 feet away from others. (If someone will drive you to your test, stay in the backseat, as far away from the  as you can.)  Cover your mouth and nose with a mask, tissue or washcloth.  Go straight to the testing site. Don't make any stops on the way there or back.  Please note  Caregivers in these groups are at risk for severe illness due to COVID-19:  o People 65 years and older  o People who live in a nursing home or long-term care facility  o People with chronic disease (lung, heart, cancer, diabetes, kidney, liver, immunologic)  o People who have a weakened immune system, including those who:  Are in cancer treatment  Take medicine that weakens the immune system, such as corticosteroids  Had a bone marrow or organ transplant  Have an immune deficiency  Have poorly controlled HIV or AIDS  Are obese (body mass index of 40 or higher)  Smoke regularly  Where can I get more information?  [facility] -- About COVID-19: [email or web address]  CDC -- What to Do If You're Sick: www.cdc.gov/coronavirus/2019-ncov/about/steps-when-sick.html  CDC -- Ending Home Isolation: www.cdc.gov/coronavirus/2019-ncov/hcp/disposition-in-home-patients.html  CDC -- Caring for Someone: www.cdc.gov/coronavirus/2019-ncov/if-you-are-sick/care-for-someone.html  [facility] --  Interim Guidance for Hospital Discharge to Home: www.health.[facility].[place]/diseases/coronavirus/hcp/hospdischarge.pdf  [facility] clinical trials (COVID-19 research studies): clinicalaffairs.[facility]/umn-clinical-trials  Below are the COVID-19 hotlines at the [place] Department of Health ([facility]). Interpreters are available.  For health questions: Call 007-795-0706 or 1-994.924.1522 (7 a.m. to 7 p.m.)  For questions about schools and childcare: Call 281-865-5513 or 1-153.182.7293 (7 a.m. to 7 p.m.)    Diagnosis: Contact with and (suspected) exposure to other viral communicable diseases  Diagnosis ICD: Z20.828

## 2020-11-06 DIAGNOSIS — Z20.822 SUSPECTED COVID-19 VIRUS INFECTION: Primary | ICD-10-CM

## 2020-11-07 DIAGNOSIS — Z20.822 SUSPECTED COVID-19 VIRUS INFECTION: ICD-10-CM

## 2020-11-07 PROCEDURE — U0003 INFECTIOUS AGENT DETECTION BY NUCLEIC ACID (DNA OR RNA); SEVERE ACUTE RESPIRATORY SYNDROME CORONAVIRUS 2 (SARS-COV-2) (CORONAVIRUS DISEASE [COVID-19]), AMPLIFIED PROBE TECHNIQUE, MAKING USE OF HIGH THROUGHPUT TECHNOLOGIES AS DESCRIBED BY CMS-2020-01-R: HCPCS | Performed by: PHYSICIAN ASSISTANT

## 2020-11-09 LAB
SARS-COV-2 RNA SPEC QL NAA+PROBE: NOT DETECTED
SPECIMEN SOURCE: NORMAL

## 2020-11-10 ENCOUNTER — OFFICE VISIT - HEALTHEAST (OUTPATIENT)
Dept: BEHAVIORAL HEALTH | Facility: CLINIC | Age: 30
End: 2020-11-10

## 2020-11-10 DIAGNOSIS — F32.2 CURRENT SEVERE EPISODE OF MAJOR DEPRESSIVE DISORDER WITHOUT PSYCHOTIC FEATURES WITHOUT PRIOR EPISODE (H): ICD-10-CM

## 2020-11-10 ASSESSMENT — ANXIETY QUESTIONNAIRES
6. BECOMING EASILY ANNOYED OR IRRITABLE: MORE THAN HALF THE DAYS
4. TROUBLE RELAXING: MORE THAN HALF THE DAYS
1. FEELING NERVOUS, ANXIOUS, OR ON EDGE: MORE THAN HALF THE DAYS
GAD7 TOTAL SCORE: 14
5. BEING SO RESTLESS THAT IT IS HARD TO SIT STILL: MORE THAN HALF THE DAYS
3. WORRYING TOO MUCH ABOUT DIFFERENT THINGS: NEARLY EVERY DAY
IF YOU CHECKED OFF ANY PROBLEMS ON THIS QUESTIONNAIRE, HOW DIFFICULT HAVE THESE PROBLEMS MADE IT FOR YOU TO DO YOUR WORK, TAKE CARE OF THINGS AT HOME, OR GET ALONG WITH OTHER PEOPLE: VERY DIFFICULT
7. FEELING AFRAID AS IF SOMETHING AWFUL MIGHT HAPPEN: SEVERAL DAYS
2. NOT BEING ABLE TO STOP OR CONTROL WORRYING: MORE THAN HALF THE DAYS

## 2020-11-10 ASSESSMENT — PATIENT HEALTH QUESTIONNAIRE - PHQ9: SUM OF ALL RESPONSES TO PHQ QUESTIONS 1-9: 15

## 2020-12-05 ENCOUNTER — COMMUNICATION - HEALTHEAST (OUTPATIENT)
Dept: BEHAVIORAL HEALTH | Facility: CLINIC | Age: 30
End: 2020-12-05

## 2020-12-05 DIAGNOSIS — F32.2 CURRENT SEVERE EPISODE OF MAJOR DEPRESSIVE DISORDER WITHOUT PSYCHOTIC FEATURES WITHOUT PRIOR EPISODE (H): ICD-10-CM

## 2020-12-08 ENCOUNTER — OFFICE VISIT - HEALTHEAST (OUTPATIENT)
Dept: FAMILY MEDICINE | Facility: CLINIC | Age: 30
End: 2020-12-08

## 2020-12-08 DIAGNOSIS — U07.1 COVID-19: ICD-10-CM

## 2020-12-08 DIAGNOSIS — B00.9 HSV (HERPES SIMPLEX VIRUS) INFECTION: ICD-10-CM

## 2020-12-08 ASSESSMENT — PATIENT HEALTH QUESTIONNAIRE - PHQ9: SUM OF ALL RESPONSES TO PHQ QUESTIONS 1-9: 7

## 2020-12-20 ENCOUNTER — HEALTH MAINTENANCE LETTER (OUTPATIENT)
Age: 30
End: 2020-12-20

## 2021-03-10 ENCOUNTER — OFFICE VISIT - HEALTHEAST (OUTPATIENT)
Dept: BEHAVIORAL HEALTH | Facility: CLINIC | Age: 31
End: 2021-03-10

## 2021-03-10 DIAGNOSIS — F12.20 CANNABIS USE DISORDER, MODERATE, DEPENDENCE (H): ICD-10-CM

## 2021-03-10 DIAGNOSIS — F19.90 SUBSTANCE USE: ICD-10-CM

## 2021-03-10 DIAGNOSIS — F32.2 CURRENT SEVERE EPISODE OF MAJOR DEPRESSIVE DISORDER WITHOUT PSYCHOTIC FEATURES WITHOUT PRIOR EPISODE (H): ICD-10-CM

## 2021-03-11 ENCOUNTER — COMMUNICATION - HEALTHEAST (OUTPATIENT)
Dept: BEHAVIORAL HEALTH | Facility: CLINIC | Age: 31
End: 2021-03-11

## 2021-03-18 ENCOUNTER — OFFICE VISIT - HEALTHEAST (OUTPATIENT)
Dept: FAMILY MEDICINE | Facility: CLINIC | Age: 31
End: 2021-03-18

## 2021-03-18 DIAGNOSIS — Z00.00 ROUTINE HEALTH MAINTENANCE: ICD-10-CM

## 2021-03-18 DIAGNOSIS — A60.00 GENITAL HERPES SIMPLEX, UNSPECIFIED SITE: ICD-10-CM

## 2021-03-18 DIAGNOSIS — F32.2 CURRENT SEVERE EPISODE OF MAJOR DEPRESSIVE DISORDER WITHOUT PSYCHOTIC FEATURES WITHOUT PRIOR EPISODE (H): ICD-10-CM

## 2021-03-18 DIAGNOSIS — Z11.3 SCREEN FOR STD (SEXUALLY TRANSMITTED DISEASE): ICD-10-CM

## 2021-03-18 DIAGNOSIS — Z30.017 NEXPLANON INSERTION: ICD-10-CM

## 2021-03-18 DIAGNOSIS — Z13.220 SCREENING FOR LIPID DISORDERS: ICD-10-CM

## 2021-03-18 DIAGNOSIS — Z12.4 SCREENING FOR MALIGNANT NEOPLASM OF CERVIX: ICD-10-CM

## 2021-03-18 DIAGNOSIS — F41.1 ANXIETY STATE: ICD-10-CM

## 2021-03-18 DIAGNOSIS — Z13.1 SCREENING FOR DIABETES MELLITUS: ICD-10-CM

## 2021-03-18 LAB
ANION GAP SERPL CALCULATED.3IONS-SCNC: 12 MMOL/L (ref 5–18)
BUN SERPL-MCNC: 9 MG/DL (ref 8–22)
CALCIUM SERPL-MCNC: 9.1 MG/DL (ref 8.5–10.5)
CHLORIDE BLD-SCNC: 106 MMOL/L (ref 98–107)
CHOLEST SERPL-MCNC: 137 MG/DL
CO2 SERPL-SCNC: 23 MMOL/L (ref 22–31)
CREAT SERPL-MCNC: 0.76 MG/DL (ref 0.6–1.1)
FASTING STATUS PATIENT QL REPORTED: NO
GFR SERPL CREATININE-BSD FRML MDRD: >60 ML/MIN/1.73M2
GLUCOSE BLD-MCNC: 100 MG/DL (ref 70–125)
HCG UR QL: NEGATIVE
HDLC SERPL-MCNC: 52 MG/DL
HIV 1+2 AB+HIV1 P24 AG SERPL QL IA: NEGATIVE
LDLC SERPL CALC-MCNC: 72 MG/DL
POTASSIUM BLD-SCNC: 3.9 MMOL/L (ref 3.5–5)
SODIUM SERPL-SCNC: 141 MMOL/L (ref 136–145)
TRIGL SERPL-MCNC: 65 MG/DL

## 2021-03-18 ASSESSMENT — MIFFLIN-ST. JEOR: SCORE: 1446.98

## 2021-03-19 LAB
C TRACH DNA SPEC QL PROBE+SIG AMP: NEGATIVE
HCV AB SERPL QL IA: NEGATIVE
N GONORRHOEA DNA SPEC QL NAA+PROBE: NEGATIVE
T PALLIDUM AB SER QL: NEGATIVE

## 2021-03-22 LAB

## 2021-03-26 ENCOUNTER — COMMUNICATION - HEALTHEAST (OUTPATIENT)
Dept: ADMINISTRATIVE | Facility: CLINIC | Age: 31
End: 2021-03-26

## 2021-03-26 ENCOUNTER — AMBULATORY - HEALTHEAST (OUTPATIENT)
Dept: FAMILY MEDICINE | Facility: CLINIC | Age: 31
End: 2021-03-26

## 2021-03-26 ENCOUNTER — COMMUNICATION - HEALTHEAST (OUTPATIENT)
Dept: FAMILY MEDICINE | Facility: CLINIC | Age: 31
End: 2021-03-26

## 2021-03-26 DIAGNOSIS — B96.89 BACTERIAL VAGINOSIS: ICD-10-CM

## 2021-03-26 DIAGNOSIS — N76.0 BACTERIAL VAGINOSIS: ICD-10-CM

## 2021-03-29 ENCOUNTER — COMMUNICATION - HEALTHEAST (OUTPATIENT)
Dept: FAMILY MEDICINE | Facility: CLINIC | Age: 31
End: 2021-03-29

## 2021-04-23 ENCOUNTER — COMMUNICATION - HEALTHEAST (OUTPATIENT)
Dept: BEHAVIORAL HEALTH | Facility: CLINIC | Age: 31
End: 2021-04-23

## 2021-04-23 DIAGNOSIS — F32.2 CURRENT SEVERE EPISODE OF MAJOR DEPRESSIVE DISORDER WITHOUT PSYCHOTIC FEATURES WITHOUT PRIOR EPISODE (H): ICD-10-CM

## 2021-04-24 ENCOUNTER — HEALTH MAINTENANCE LETTER (OUTPATIENT)
Age: 31
End: 2021-04-24

## 2021-05-27 VITALS
BODY MASS INDEX: 28.13 KG/M2 | SYSTOLIC BLOOD PRESSURE: 102 MMHG | DIASTOLIC BLOOD PRESSURE: 60 MMHG | OXYGEN SATURATION: 100 % | RESPIRATION RATE: 18 BRPM | TEMPERATURE: 98.2 F | HEART RATE: 80 BPM | WEIGHT: 153.8 LBS

## 2021-05-28 ENCOUNTER — OFFICE VISIT - HEALTHEAST (OUTPATIENT)
Dept: FAMILY MEDICINE | Facility: CLINIC | Age: 31
End: 2021-05-28

## 2021-05-28 DIAGNOSIS — R10.2 VAGINAL PAIN: ICD-10-CM

## 2021-05-28 LAB
CLUE CELLS: NORMAL
TRICHOMONAS, WET PREP: NORMAL
YEAST, WET PREP: NORMAL

## 2021-05-28 ASSESSMENT — ANXIETY QUESTIONNAIRES
GAD7 TOTAL SCORE: 21
GAD7 TOTAL SCORE: 18
GAD7 TOTAL SCORE: 14
GAD7 TOTAL SCORE: 17

## 2021-05-29 LAB
C TRACH DNA SPEC QL NAA+PROBE: NEGATIVE
N GONORRHOEA DNA SPEC QL NAA+PROBE: NEGATIVE
SPEC DESCRIPTION: NORMAL
SPECIMEN DESCRIPTION: NORMAL

## 2021-06-04 VITALS — BODY MASS INDEX: 26.34 KG/M2 | WEIGHT: 144 LBS | BODY MASS INDEX: 26.34 KG/M2 | HEIGHT: 62 IN

## 2021-06-05 ENCOUNTER — RECORDS - HEALTHEAST (OUTPATIENT)
Dept: FAMILY MEDICINE | Facility: CLINIC | Age: 31
End: 2021-06-05

## 2021-06-05 DIAGNOSIS — Z34.00 SUPERVISION OF NORMAL FIRST PREGNANCY: ICD-10-CM

## 2021-06-05 DIAGNOSIS — Z36.89 ENCOUNTER FOR FETAL ANATOMIC SURVEY: ICD-10-CM

## 2021-06-05 NOTE — TELEPHONE ENCOUNTER
Test Results  Who is calling?:  Patient  Who ordered the test:  [facility]  Type of test: Lab  Date of test:  1/8/2020  Where was the test performed:  In clinic  What are your questions/concerns?:  Patient thought one test was not done when in clinic.  Writer shared has no yeast infection  and negative on Chlamydia trachomatis & Neisseria gonorrhoeae, Amplified Detection, No further questions.    Okay to leave a detailed message?:  No call needed.

## 2021-06-05 NOTE — PATIENT INSTRUCTIONS - HE
Warm water sitz bath as needed for comfort  May use lidocaine gel sparingly for pain four times a day as needed  Use squirt bottle during urination to prevent stinging  May use vaseline or A&D type ointment in the vaginal area to provide protective barrier  Avoid soap  Continue the valacyclovir treatment dosing  Follow up with your primary clinic regarding prophylactic doses  Azithromycin 1000mg 1 dose was given today in clinic for possible chlamydia recurrence pending the results - we will call with results when available.   No sex for a week or more until bottom is healed.   Fluconazole 1 pill if symptoms of yeast infection, may repeat 4 days later if needed    Artificial tears as needed and ketotifen eye drops twice a day as needed for eye irritation    No work today    Recheck if worse or no better.

## 2021-06-05 NOTE — PROGRESS NOTES
Assessment/Plan:   Vaginal discharge  Herpes genitalis  Viral conjunctivitis of right eye  She seems to have mild viral URI with right eye conjunctivitis. There is no evidence for herpes lesions in the right eye at this time.   She does have a current herpes genitalis outbreak but in addition there is a slight fissure right labia minora which is causing most of the pain. This is likely traumatic, perhaps the skin split or there was a scratch. Scant discharge but she requests repeat test for chlamydia. Empiric treatment for chlamydia since she is doubtful her partner was treated. No yeast now (wet prep today is negative) but at risk with recurrent antibiotic so diflucan prescribed if needed.   Artificial tears and ketotifen prn for eye. Needs to go to eye doctor if the eye redness, pain irritation worsen given the potential risk of spreading herpes to the eye.    - Chlamydia trachomatis & Neisseria gonorrhoeae, Amplified Detection  - Wet Prep, Vaginal  - azithromycin tablet 1,000 mg (ZITHROMAX)  - fluconazole (DIFLUCAN) 150 MG tablet; Take 1 tablet (150 mg total) by mouth once for 1 dose. May repeat in 72 hours if symptoms persist  Dispense: 2 tablet; Refill: 0  - ketotifen (ZADITOR/ZYRTEC ITCHY EYES) 0.025 % (0.035 %) ophthalmic solution; Administer 1 drop to the right eye 2 (two) times a day. As needed for eye irritation  Dispense: 5 mL; Refill: 0    Warm water sitz bath as needed for comfort  May use lidocaine gel sparingly for pain four times a day as needed  Use squirt bottle during urination to prevent stinging  May use vaseline or A&D type ointment in the vaginal area to provide protective barrier  Avoid soap  Continue the valacyclovir treatment dosing  Follow up with your primary clinic regarding prophylactic doses  Azithromycin 1000mg 1 dose was given today in clinic for possible chlamydia recurrence pending the results - we will call with results when available.   No sex for a week or more until bottom is  healed.   Fluconazole 1 pill if symptoms of yeast infection, may repeat 4 days later if needed    Artificial tears as needed and ketotifen eye drops twice a day as needed for eye irritation    No work today    Recheck if worse or no better.     Subjective:      Myrna Martinez is a 29 y.o. female who presents with multiple concerns. The first is that her right ey has been slightly red and swollen since yesterday. Initially just a little puffy and itchy, this morning was crusty. She has had clear gel like matter off and on today. Some increased tearing. No photophobia or eye pain. Not as puffy today. No known exposures. Mild nasal congestion. No fever. No ear pain or ST. No sinus pressure. No change in vision. No FB sensation.   Her other concern is regarding pain in the perineal area. She had sex on Friday - fairly energetic sex but had no pain at that time. On Saturday she felt some irritation which she didn't think was unusual after the activities of the night before. On Sunday the symptoms worsened and she recognized the sxs of a herpes outbreak which she has had before. Because she is out of valtrex she went on Sunday to the [facility] where they confirmed the appearance of herpes lesions in the perineum and prescribed valtrex which she is taking. Unlike past episodes she is getting more and more uncomfortable instead of better. She is so uncomfortable that she cannot sit or walk. She has pain with urination when the urine hits the outside. No urinary urgency or frequency, no abdominal pain or flank pain. No significant vaginal discharge. She was treated in November for chlamydia (doxycycline for 10 days) and her partner claims to have been treated but it was a one time dose so she is afraid he wasn't treated and she might have some STI back again. Her periods are irregular, she has nexplanon.   Remaining ROS negative.   Non-smoker.     No Known Allergies     Current Outpatient Medications on File Prior to  Visit   Medication Sig Dispense Refill     etonogestrel (NEXPLANON) 68 mg Impl implant Inject 68 mg under the skin.       ibuprofen 200 mg cap Take 400 mg by mouth.       acyclovir (ZOVIRAX) 400 MG tablet Take 1 tablet (400 mg total) by mouth 3 (three) times a day. 90 tablet 3     fluticasone (FLONASE) 50 mcg/actuation nasal spray 1 spray into each nostril 2 times a day at 6:00 am and 4:00 pm. 16 g 0     LORazepam (ATIVAN) 1 MG tablet Take 1 tablet (1 mg total) by mouth every 8 (eight) hours as needed for anxiety. 30 tablet 0     prenatal vit-iron fumarate-FA (ANJUM PRENATAL) 28-0.8 mg Tab Take 1 tablet by mouth daily. 90 tablet 3     No current facility-administered medications on file prior to visit.      /prob  Past Medical History:   Diagnosis Date     Anemia 1/12/2015     Breast disorder     pain under breasts     Headaches, cluster      Herpes      Mental disorder     anxiety and depression       Objective:     /60 (Patient Site: Right Arm, Patient Position: Sitting, Cuff Size: Adult Regular)   Pulse 80   Temp 98.2  F (36.8  C) (Oral)   Resp 18   Wt 153 lb 12.8 oz (69.8 kg)   SpO2 100%   Breastfeeding No   BMI 29.06 kg/m      Physical  General Appearance: Alert, cooperative, no distress but uncomfortable and moves gingerly.   Head: Normocephalic, without obvious abnormality, atraumatic  Eyes: Conjunctiva on the left is normal. On the right the eyelids are very slightly swollen, no redness externally, there is conjunctival injection more so on the tarsal surface than the eye. No ciliary flush, no photophobia. No apparent corneal defect, negative fluorescein stain, no dendritic lesions noted. No purulent drainage, scant clear mucus gel in the medial corner. EOMI. PERRLA.   Ears: Normal TMs and external ear canals, both ears  Nose: No significant congestion.  Throat: Throat is normal.  No exudate.  No significant lesions  Neck: No adenopathy  Abdomen: Soft, non-tender  Perineum: there is a tender  palpable right inguinal node. There is a cluster of herpetic lesions at the introitus and at the right labia minora. There is also a fissure in the right labial minora starting near the clitoris which is the site of maximal tenderness. No apparent secondary infection or abscess here. No new blisters apparent. Wet prep obtained. There is no odor, scant discharge. Due to tenderness at the introitus I deferred spec exam and obtained a urine sample for GC/chlamydia. Lidocaine gel was applied for comfort   Skin: no other rashes or lesions  Psychiatric: Patient has a normal mood and affect.        Recent Results (from the past 24 hour(s))   Wet Prep, Vaginal   Result Value Ref Range    Yeast Result No yeast seen No yeast seen    Trichomonas No Trichomonas seen No Trichomonas seen    Clue Cells, Wet Prep No Clue cells seen No Clue cells seen

## 2021-06-06 NOTE — TELEPHONE ENCOUNTER
LMTCB please ask Pt if she would like to keep appointment or reschedule. Due to COVID-19 we are asking Pts to stay home if there is nothing urgent, medication refills, new meds are things they will need to be seen for.

## 2021-06-06 NOTE — PROGRESS NOTES
"Assessment/Plan:     1. Breast mass, right  I suspect an abnormal density secondary to history of breast reduction.  Regardless, will send patient for mammogram and diagnostic ultrasound to rule out any suspicious characteristics.  - US Breast Right Limited 1-3 Quadrants; Future  - Mammo Diagnostic Right; Future        Subjective:     Myrna Martinez is a 29 y.o. female accompanied by her boyfriend who presents with concerns about a right breast mass.  Patient first noticed this a couple of weeks ago.  She does have some tenderness in the area.  Pertinent past history of a breast reduction.  Patient was unable to breast-feed her children due to this.  Pertinent family history of breast cancer in her maternal aunt and maternal great aunt.      The following portions of the patient's history were reviewed and updated as appropriate: allergies, current medications.    Review of Systems  A comprehensive review of systems was performed and was otherwise negative    Objective:     BP 90/50   Ht 5' 2\" (1.575 m)   Wt 147 lb 3.2 oz (66.8 kg)   BMI 26.92 kg/m      General Appearance: Alert, cooperative, no distress, appears stated age  Breasts: S/P bilateral breast reduction.  No rash or redness.  No dimpling or discharge from the nipples.  There is an area of increased density to the right upper/outer quadrant.  This area is nontender.      Daniela العراقي NP    "

## 2021-06-06 NOTE — PROGRESS NOTES
Assessment/Plan:   Lump of right breast  Family history of breast cancer  Abnormal vaginal bleeding  Probalbe right lateral breast lump on exam. Family history of breast cancer.   It is reasonable to obtain diagnostic mammogram and ultrasound to assess this further before her appointment next week. Unfortunately we are unable to arrange tonight from walk in clinic and schedulers are closed.   We scheduled appointment with primary care for tomorrow to assess and arrange testing.   Regarding the abnormal bleeding, the UPT was still negative. May need additional evaluation of that as well with primary care.  - Pregnancy (Beta-hCG, Qual), Urine    I discussed red flag symptoms, return precautions to clinic/ER and follow up care with patient/parent.  Expected clinical course, symptomatic cares advised. Questions answered. Patient/parent amenable with plan.    Subjective:      Myrna Martinez is a 29 y.o. female who presents with her mom for evaluation of a lump in her right breast. She has an appointment with her mom's primary care provider next week but they didn't feel they could wait.   Lump noticed right lateral lump a week ago, no change, slightly tender. Does not do regular self breast exams. Noticed soreness lateral breast and when palpated if felt very lumpy with a discrete lump that seems too big to be normal.   Also noted a week ago that the tip of the right >left nipple seems lighter in color which seems new to her. Has noted off and on soreness right upper outer breast/chest wall pain randomly for 6 months or so. No trigger.   No nipple drainage or crusting. Possible itching. No rash. No redness or warmth or tenderness.   Has cut out caffeine the last 3 weeks.   S/P breast reduction surgery bilaterally.   Has family history of breast cancer - maternal aunt (mother's sister) and mother's paternal aunt.   Has nexplanon - this one placed about 2 years ago. Has used nexplanon however for a long time - before  and after her two pregnancies. Usually has no bleeding, sometimes occasional spotting.   Starting 2/24/20 she had actual period type bleeding along with cramping and moodiness. Negative UPT at that time. Bleeding lasted through 3/4/20, none now.   No vaginal sxs or urinary sxs. No abdominal or flank pain.   No fever, chills, URI , cough, ST, rash, N/V/D. Some recent weight loss.   Never smoker.     No Known Allergies  Current Outpatient Medications on File Prior to Visit   Medication Sig Dispense Refill     acyclovir (ZOVIRAX) 400 MG tablet Take 1 tablet (400 mg total) by mouth 3 (three) times a day. 90 tablet 3     etonogestrel (NEXPLANON) 68 mg Impl implant Inject 68 mg under the skin.       fluticasone (FLONASE) 50 mcg/actuation nasal spray 1 spray into each nostril 2 times a day at 6:00 am and 4:00 pm. 16 g 0     ibuprofen 200 mg cap Take 400 mg by mouth.       ketotifen (ZADITOR/ZYRTEC ITCHY EYES) 0.025 % (0.035 %) ophthalmic solution Administer 1 drop to the right eye 2 (two) times a day. As needed for eye irritation 5 mL 0     LORazepam (ATIVAN) 1 MG tablet Take 1 tablet (1 mg total) by mouth every 8 (eight) hours as needed for anxiety. 30 tablet 0     prenatal vit-iron fumarate-FA (ANJUM PRENATAL) 28-0.8 mg Tab Take 1 tablet by mouth daily. 90 tablet 3     No current facility-administered medications on file prior to visit.      Patient Active Problem List   Diagnosis     Major Depression, Single Episode     Anxiety     Pap Smear (+) Low Grade Squamous Intraepithelial Lesion     Genital herpes     Allergic rhinitis     Anemia     Vaginal pain       Objective:     /68   Pulse 86   Temp 98.3  F (36.8  C) (Oral)   Resp 16   Wt 147 lb (66.7 kg)   SpO2 100%   BMI 26.89 kg/m      Physical  General Appearance: Alert, cooperative, no distress, AVSS  Head: Normocephalic, without obvious abnormality, atraumatic  Eyes: Conjunctivae are normal.   Nose: No significant congestion.  Throat: lips pink and  moist  Lungs: Clear to auscultation bilaterally, respirations unlabored  Heart: Regular rate and rhythm  Breast: bilateral breast reduction scars. Normal everted nipples bilaterally. There is lighter pigmentation of the tip of the right nipple relative to the remaining nipple tissue and areola as well as relative to the left nipple tip though the left tip is also lighter relative to the surrounding color. No crusting, drainage. There is increased density breast tissue in the lateral and lower breasts bilaterally. The right lateral breast is more dense and irregular and there is a sense of a lump in the midst of this 1-2cm size. No fluctuance. Slightly tender. Not hard. No overlying redness or warmth or cellulitis or sign of definite mastitis. No definite axillary nodes.    Abdomen: Soft, non-tender, no CVA tenderness with percussion  Skin:  no rashes or lesions  Psychiatric: Patient has a normal mood and affect, slight anxiety.        Recent Results (from the past 24 hour(s))   Pregnancy (Beta-hCG, Qual), Urine   Result Value Ref Range    Pregnancy Test, Urine Negative Negative

## 2021-06-07 NOTE — TELEPHONE ENCOUNTER
"Patient Returning Call  Reason for call:  Regarding future appointment.   Information relayed to patient:  See JAMIL Fierro's message below. The patient states she is ok with cancelling the appointment on 4/2/20.   Patient has additional questions:  Yes  If YES, what are your questions/concerns:  The patient states she needs a mammogram for a right breast lump. The patient states scheduling called her but she hasn't been able to set up the mammogram appointment because \" of everything else going on \". Writer advised the patient to schedule the mammogram and to follow up with Jennyfer Perdue MD once the results are completed, a follow up appointment or telephone appointment can be completed if needed.Patient verbalized understanding and agrees with this plan.  The patient was transferred to scheduling.     Okay to leave a detailed message?: No call back needed at this time.      "

## 2021-06-07 NOTE — TELEPHONE ENCOUNTER
LMTCB please ask Pt if she would like to reschedule her appointment unless urgent with COVID-19 going on we are asking Pt to stay home if nothing is urgent. But we can still see her if she wishes   - Patient with worsening IVAN with iron studies showing iron deficiency  - Of note, patient said that she had 1 episode of black colored stool last month, none since then, denies any current melena or hematochezia/BRBPR -> will check a FOBT, consider GI eval in AM

## 2021-06-09 NOTE — TELEPHONE ENCOUNTER
----- Message from Daniela العراقي NP sent at 7/7/2020  1:32 PM CDT -----  Please call patient.    All her labs are normal.  STD testing is all negative.  Continue with plan as discussed with clinic.    Daniela العراقي NP

## 2021-06-09 NOTE — PROGRESS NOTES
Assessment/Plan:     1. Encounter to establish care    2. Screen for STD (sexually transmitted disease)  No known exposure.   - Chlamydia trachomatis & Neisseria gonorrhoeae, Amplified Detection  - Hepatitis C Antibody (Anti-HCV)  - HIV Antigen/Antibody Screening [place]  - Syphilis Screen, Cascade    3. Anxiety and depression  Patient with significant anxiety, depression, and concerns regarding concentration.  Strong family history of bipolar disorder and significant mental health issues.  Recommend a psychiatry consult for definitive diagnosis and management.  I do have concerns regarding possible bipolar disorder   - AMB REFERRAL TO MENTAL HEALTH AND ADDICTION  - Adult (18+); Psychiatry, ECT and Medication Management; Psychiatry; [facility] [phone]; We will contact you to schedule the appointment or please call with any ques...    4. Attention and concentration deficit  - AMB REFERRAL TO MENTAL HEALTH AND ADDICTION  - Adult (18+); Psychiatry, ECT and Medication Management; Psychiatry; [facility] [phone]; We will contact you to schedule the appointment or please call with any ques...    5. Fatigue, unspecified type  - HM2(CBC w/o Differential)  - Thyroid Cascade  - Vitamin D, Total (25-Hydroxy)  - Comprehensive Metabolic Panel    6. Genital herpes simplex, unspecified site  Infrequent outbreaks.  - acyclovir (ZOVIRAX) 400 MG tablet; Take 1 tablet (400 mg total) by mouth 3 (three) times a day for 5 days.  Dispense: 45 tablet; Refill: 5    7. Breakthrough bleeding on Nexplanon  Discussed that this is likely normal with her Nexplanon.  Due for replacement in December 2020.  May consider earlier if she would prefer to see if this helps with bleeding.     8. Ganglion cyst of wrist, left  Not terribly bothersome at this time.  Does not wish to pursue any intervention at this time.     Subjective:     Myrna Martniez is a 30 y.o. female who  presents to establish care and with several concerns.  Patient is accompanied by her son.  She is single, in a relationship.  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    History of genital herpes.  She gets an outbreak about every 2 months.  Uses acyclovir for symptoms, and this is very beneficial.  She is requesting STD testing today, as she has had a new partner.  No known exposure.  Patient has a Nexplanon in place for contraception.  This was placed in December 2017.  This is her third Nexplanon.  She has had a lot more breakthrough bleeding in the last several months, which is abnormal for her compared to previous implants.  She does have a history of abnormal Pap, however her last Pap in 2017 was normal with negative HPV.    Patient has a ganglion cyst on her left dorsal wrist.  It has been there for quite some time.  It is not terribly bothersome.  No pain or paresthesias in the hand.  She is not interested in getting this surgically removed at this time.    Patient complains of fatigue.  States this is fairly generalized.  She is not sure if this is related at all to her mental health concerns.  Denies any abnormal weight gain/loss, constipation/diarrhea, or difficulty sleeping at night.  She does have a history of anemia.    Patient's main concern today is her mental health.  She does have a past history of anxiety and depression.  She was previously on medications.  Believes she was on Zoloft in the [place].  She has been hospitalized for her mental health in the past.  She is very concerned regarding her concentration and time management.  She feels like she cannot keep anything straight at home and has a hard time managing her time, money, and household duties.  She feels like she cannot be a good friend, daughter, or mother due to these concerns.  She does not believe she has ever had a diagnosis of ADD or ADHD.  She has been feeling more anxious and depressed.  No  "suicidal ideations, as she is fearful to die.  She has not had any attempts.  No history of self-harm.  Patient does not believe she has ever met with a psychiatrist.  There is significant family history of mental health issues.  Her mother suffers from bipolar disorder and severe anxiety.      The following portions of the patient's history were reviewed and updated as appropriate: allergies, current medications, past family history, past medical history, past social history, past surgical history and problem list.    Review of Systems  A comprehensive review of systems was performed and was otherwise negative    Objective:     /60   Pulse 88   Ht 5' 2\" (1.575 m)   Wt 153 lb 11.2 oz (69.7 kg)   BMI 28.11 kg/m      General Appearance: Alert, cooperative, no distress, appears stated age  Head: Normocephalic, without obvious abnormality, atraumatic  Eyes: PERRL, conjunctiva/corneas clear, EOM's intact  Ears: Normal TM's and external ear canals, both ears  Throat: Lips, mucosa, and tongue normal; teeth and gums normal  Neck: Supple, symmetrical, trachea midline, no adenopathy;  thyroid: not enlarged, symmetric, no tenderness/mass/noduless  Lungs: Clear to auscultation bilaterally, respirations unlabored  Heart: Regular rate and rhythm, S1 and S2 normal, no murmur, rub, or gallop  Skin: Skin color, texture, turgor normal. ~1 cm ganglion cyst to the dorsal left wrist  Neurologic: Normal  Psychologic: appropriate affective, answers all of my questions appropriately. No hallucinations, delusion, or suicidal ideations.    PHQ-9 Total Score: 18 (7/6/2020 11:00 AM)    NICHOLE 7 Total Score: 21 (7/6/2020 11:00 AM)        Daniela العراقي NP    "

## 2021-06-10 NOTE — PROGRESS NOTES
"Myrna Martinez is a 30 y.o. female who is being evaluated via a billable video visit.      The patient has been notified of following:     \"This video visit will be conducted via a call between you and your physician/provider. We have found that certain health care needs can be provided without the need for an in-person physical exam.  This service lets us provide the care you need with a video conversation.  If a prescription is necessary we can send it directly to your pharmacy.  If lab work is needed we can place an order for that and you can then stop by our lab to have the test done at a later time.    Video visits are billed at different rates depending on your insurance coverage. Please reach out to your insurance provider with any questions.    If during the course of the call the physician/provider feels a video visit is not appropriate, you will not be charged for this service.\"    Patient has given verbal consent to a Video visit? Yes  How would you like to obtain your AVS? AVS Preference: Mail a copy.  If dropped by the video visit, the video invitation should be sent to: Text to cell phone: [phone]  Will anyone else be joining your video visit? No        Video Start Time: 1151     Patient presents via video visit with complaints regarding a rash.  Reports a red, flaky rash to her left hip area and some scattered lesions on her abdomen.  Symptoms started about 6 weeks ago and have worsened.  Denies any pain, itching, or drainage.  No treatments tried at home.     Additional provider notes: GENERAL: Healthy, alert and no distress  SKIN: excoriated, circular, flaky lesion to left hip with central clearing.     Assessment/Plan:  1. Tinea corporis  Appears to be ringworm.  Prescribed Ketoconazole cream twice daily.  May take up to 4 weeks to fully treat.  Encouraged her to avoid sitting in wet or damp clothing for long periods.   - ketoconazole (NIZORAL) 2 % cream; Apply topically 2 (two) times a day. " To affected lesions. May use for up to 1 month to see full response.  Dispense: 60 g; Refill: 0      Video-Visit Details    Type of service:  Video Visit    Video End Time (time video stopped): 1200  Originating Location (pt. Location): Home    Distant Location (provider location):  [facility]     Platform used for Video Visit: Brittany العراقي NP

## 2021-06-10 NOTE — TELEPHONE ENCOUNTER
Who is calling:  Patient  Reason for Call:  Pt calling because she is unsure of the treatment she should be using for the fungal infection her provider diagnosed her with.  Pt states it is worsening and spots have been showing in different areas.  Pt states it is not bothersome or itchy just not sure if she needs medication whether over the counter or prescribed.  Pharmacy on file.  Please advise.  Date of last appointment with primary care: N/A  Okay to leave a detailed message: No

## 2021-06-10 NOTE — TELEPHONE ENCOUNTER
Please call patient.    I do not recall discussing this.  Please set patient up with a virtual visit to discuss.    Daniela العراقي NP

## 2021-06-11 NOTE — PROGRESS NOTES
"Myrna Martinez is a 30 y.o. female who is being evaluated via a billable telephone visit.      The patient has been notified of following:     \"This telephone visit will be conducted via a call between you and your physician/provider. We have found that certain health care needs can be provided without the need for a physical exam.  This service lets us provide the care you need with a short phone conversation.  If a prescription is necessary we can send it directly to your pharmacy.  If lab work is needed we can place an order for that and you can then stop by our lab to have the test done at a later time.    Telephone visits are billed at different rates depending on your insurance coverage. During this emergency period, for some insurers they may be billed the same as an in-person visit.  Please reach out to your insurance provider with any questions.    If during the course of the call the physician/provider feels a telephone visit is not appropriate, you will not be charged for this service.\"    Patient has given verbal consent to a Telephone visit? Yes    What phone number would you like to be contacted at? [phone]    Patient would like to receive their AVS by AVS Preference: Mail a copy.    Additional provider notes: This is a telephone visit that began at 1:11 PM and ended at 1:19 PM    The patient was swimming recently in the [facility], she reports that whether it is from the swimming or not she is developed a couple of problems.  For about the past week and a half or so she has had more sinus congestion runny nose some cough and sneezing.  She thinks she actually may have gotten a friend sick who then went on to be tested for COVID which was negative.  Patient is not running a fever or temperature.  She does have a history of hayfever, she has been on antihistamines in the past but she is not taking anything for this.    She also mentions that she has a rash in the right foot or ankle area, " it is red slightly itchy, she does not have an underlying skin problem or disorder.  She is tried some over-the-counter moisturizers on this.    She has used antihistamines in the past, she however has not found nasal steroid medications to be helpful.  No other recent change in her health status.    Assessment/Plan:  1. Sinus congestion  10 days or so sinus congestion, I would lean more towards either a viral illness or perhaps some underlying allergies.    2. Rash  Cute onset of a rash right foot or lower leg area possibly some contact dermatitis.    PLAN:  1.  Recommend the patient use an over-the-counter antihistamine such as Claritin or Allegra.  Also recommend Sudafed.  2.  For the rash over-the-counter hydrocortisone.  3.  Follow-up with both her other issue as needed.    PLAN:  1.      Phone call duration:  8 minutes

## 2021-06-11 NOTE — PROGRESS NOTES
"Telemedicine Visit: The patient's condition can be safely assessed and treated via synchronous audio and visual telemedicine encounter.      Reason for Telemedicine Visit: Patient has requested telehealth visit    Originating Site (Patient Location): [facility]: [facility]    Distant Site (Provider Location): Provider Remote Setting- Home Office    Consent:  The patient/guardian has verbally consented to: the potential risks and benefits of telemedicine (video visit) versus in person care; bill my insurance or make self-payment for services provided; and responsibility for payment of non-covered services.     Mode of Communication:  Video Conference via [employer]    As the provider I attest to compliance with applicable laws and regulations related to telemedicine.  Outpatient Psychiatric Consultation     Referral Source:   Daniela العراقي    --  Chief Complaint: \"I broke up with my boyfriend yesterday, so I'm here\"     --  History of Present Illness / Client Impression of Mental Health Concerns   Myrna Martinez is a 30 y.o. female who presents for initiation of care. Referred by Daniela العراقي due to worsening symptoms.     Reports feeling panicked about the unknown. Was living with boyfriend and now worries about where she is going to live since she only has part time job. Thinks that \"bad luck\" just always keeps happening to her.     Feels depressed. Thinks she is starting to show signs similar to her grandmothers who were both diagnosed with bipolar. Sleep is on and off. Thinks she is sleeping less but energy is not necessarily worse. Feeling lonely. More depressed and suicidal as result. Describes suicidal thinking as chronic and has \"extreme fears of death\" which keeps her from harming herself. Denies plan, intent, and motivation to harm self.     Describes difficulty with concentration and memory since childhood. States she has difficulty focusing on reading and finds " "herself frequently distracting. She was not evaluated for ADHD in past but her son is diagnosed.     Smokes weed every day and wants to quit. Feels it is helpful to slow down her mind but notices that it makes her feel more down. Smoked consistently since 2011. Uses around friends that she thinks will be supportive if she decides to quit. Has tried to stop in past when going to court for custody of son. Longest period of sobriety was at that time for 2 months. Reports bad memory which everyone tells her is because of the marijuana. Denies other drug or alcohol use.     Would like to instead use medication versus weed but has difficulty remembering to take medications.     Has 5 year old son with ex-boyfriends of 6 years. Son's father lives in [place], [place]. They have shared custody and alternate every other week.       Mother takes duloxetine for depression and pain, gabapentin, and clonazepam as needed.     --  Psychiatric Review of Systems    Mood: \"sad\"  o Depression: yes   o Becka no    Impaired Sleep: yes    Impaired Energy: yes    Change of Interest/Anhedonia: yes    Appetite/Weight Changes: yes    Concentration Changes: yes    Negative cognitions of self: yes    Tearfulness: yes    Anxiety/Panic: yes     Thoughts of self harm or suicide: yes    Thoughts of harming others: no    Psychosis:  no    Clinical Outcomes Measures:  CAGE: 3/4   PHQ-9 Total Score: 16  NICHOLE-7: 18    --  Psychiatric History     Current psychiatrist  Establishing care today    Current psychotherapist  denies    Current   denies    Past Documented   Psychiatric/SUDs Diagnoses  depression   anxiety    [facility]  [facility] and son was couple months old    Suicide attempts  denies    Past medication trials & Results  vistaril   lorazepam   Celexa   Zoloft- worked well and stopped taking     Electroconvulsive therapy  n/a    Guardianship/Power of /Conservator  n/a    Judicial commitments  n/a "     --  Recent Substance Use   reports current alcohol use. rarely- on occasion    reports current drug use. Drug: Marijuana.   reports that she has quit smoking. She has never used smokeless tobacco.  reports previous caffeine use     --  Complicated Withdrawal Syndromes  Denies. No seizure history     --  Prior Substance Abuse Treatments  Denies    --  Birth & Development History  City and state of birth: [place], [place].  Living circumstances: grew up with parents and younger brother. Family moved around a lot and life was chaotic.  Somatic growth compared to peers: normal so far as aware  Academic performance in elementary school: The patient reports a history of learning difficulties, but no diagnosis of learning disabilities. The patient reports a history of involvement in special education in school.   Highest education achieved: completed High School.    --  Trauma & Abuse History  Major accidents and injuries: denies.  Concussions or traumatic brain injury: denies.    Sexual/physical/emotional abuse/trauma:  The patient denies a history of abuse or trauma.    --  Spiritual History  Sources of hope, meaning, comfort, strength, peace and love: her son.  Part of an organized Mandaen: The patient reports no particular Islam affiliation or involvement.    --  Past Medical History  Primary Care Provider: Daniela العراقي NP    Medical History:  has a past medical history of Anemia (1/12/2015), Ankle Sprain, Breast disorder, Headaches, cluster, Herpes, Mental disorder, Pap Smear (+) Low Grade Squamous Intraepithelial Lesion, and Perineal abrasion.    Medications:   Current Outpatient Medications on File Prior to Visit   Medication Sig Dispense Refill     acyclovir (ZOVIRAX) 400 MG tablet Take 400 mg by mouth every 4 (four) hours while awake.       etonogestrel (NEXPLANON) 68 mg Impl implant Inject 68 mg under the skin.       ketoconazole (NIZORAL) 2 % cream Apply topically 2 (two) times a day. To affected  lesions. May use for up to 1 month to see full response. 60 g 0     No current facility-administered medications on file prior to visit.        --  Family History  family history includes ADD / ADHD in her son; Breast cancer in her maternal aunt and another family member.    --  Sexual/Obstetric History  Last menstrual period: Patient's last menstrual period was 09/23/2020.   Pregnancy history: once    Sexually active: not currently  Current contraception: Nexplanon    --  Surgical History   has a past surgical history that includes pr reduction of large breast and Appendectomy (1996).    --  Allergies  No Known Allergies    --  Pain Medicine History  Has never been involved in a pain clinic.    --  [place] Prescription Monitoring Program  No worrisome pharmacy activity.     --  Social History  Marital status: single.  Sexual orientation: identifies as a heterosexual.  Number of children: one.    Current living circumstances: The patient lives with ex-boyfriend in apartment.  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   Current sources of financial support: financial support from family members.     Social supports: mother and friends.  Hobbies/interests: The patient reported no hobbies or interests.    --   History  Denied  service.    --  Legal History  The patient has no history of legal problems.    --  Summary of Diagnostic Studies  No visits with results within 30 Day(s) from this visit.   Latest known visit with results is:   Office Visit on 07/06/2020   Component Date Value Ref Range Status     Chlamydia trachomatis, Amplified D* 07/06/2020 Negative  Negative Final     Neisseria gonorrhoeae, Amplified D* 07/06/2020 Negative  Negative Final     Hepatitis C Ab 07/06/2020 Negative  Negative Final     HIV Antigen / Antibody 07/06/2020 Negative  Negative Final     Treponema Antibody (Syphilis) 07/06/2020 Negative  Negative Final     WBC  07/06/2020 10.6  4.0 - 11.0 thou/uL Final     RBC 07/06/2020 4.34  3.80 - 5.40 mill/uL Final     Hemoglobin 07/06/2020 13.9  12.0 - 16.0 g/dL Final     Hematocrit 07/06/2020 41.6  35.0 - 47.0 % Final     MCV 07/06/2020 96  80 - 100 fL Final     MCH 07/06/2020 32.1  27.0 - 34.0 pg Final     MCHC 07/06/2020 33.5  32.0 - 36.0 g/dL Final     RDW 07/06/2020 10.6* 11.0 - 14.5 % Final     Platelets 07/06/2020 285  140 - 440 thou/uL Final     MPV 07/06/2020 7.3  7.0 - 10.0 fL Final     TSH 07/06/2020 0.36  0.30 - 5.00 uIU/mL Final     Vitamin D, Total (25-Hydroxy) 07/06/2020 35.0  30.0 - 80.0 ng/mL Final     Sodium 07/06/2020 140  136 - 145 mmol/L Final     Potassium 07/06/2020 3.8  3.5 - 5.0 mmol/L Final     Chloride 07/06/2020 104  98 - 107 mmol/L Final     CO2 07/06/2020 24  22 - 31 mmol/L Final     Anion Gap, Calculation 07/06/2020 12  5 - 18 mmol/L Final     Glucose 07/06/2020 73  70 - 125 mg/dL Final     BUN 07/06/2020 11  8 - 22 mg/dL Final     Creatinine 07/06/2020 0.75  0.60 - 1.10 mg/dL Final     GFR MDRD Af Amer 07/06/2020 >60  >60 mL/min/1.73m2 Final     GFR MDRD Non Af Amer 07/06/2020 >60  >60 mL/min/1.73m2 Final     Bilirubin, Total 07/06/2020 0.7  0.0 - 1.0 mg/dL Final     Calcium 07/06/2020 9.5  8.5 - 10.5 mg/dL Final     Protein, Total 07/06/2020 7.4  6.0 - 8.0 g/dL Final     Albumin 07/06/2020 4.4  3.5 - 5.0 g/dL Final     Alkaline Phosphatase 07/06/2020 70  45 - 120 U/L Final     AST 07/06/2020 18  0 - 40 U/L Final     ALT 07/06/2020 20  0 - 45 U/L Final       --  Review of Systems  Wt Readings from Last 3 Encounters:   07/06/20 153 lb 11.2 oz (69.7 kg)   03/12/20 147 lb 3.2 oz (66.8 kg)   03/11/20 147 lb (66.7 kg)     Temp Readings from Last 3 Encounters:   03/11/20 98.3  F (36.8  C) (Oral)   02/10/20 97.3  F (36.3  C) (Temporal)   01/08/20 98.2  F (36.8  C) (Oral)     BP Readings from Last 3 Encounters:   07/06/20 110/60   03/12/20 90/50   03/11/20 108/68     Pulse Readings from Last 3 Encounters:  "  07/06/20 88   03/11/20 86   02/10/20 90      LMP 09/23/2020  unable to assess today Pain Score: 0  Pain Location: n/a     As noted in the subjective section above, otherwise a 10 point review of systems is negative. Limited ability to assess given virtual nature of visit. Review of symptoms based entirely on patient's verbal report and what writer is able to assess via camera.    --  Mental Status Examination    Appearance: Appears stated age, blue dyed hair ends, clean, disheveled, tank top, minimal make up.   Orientation: Patient alert and oriented to person, place, time, and situation  Reliability:  Patient appears to be an adequate historian.   Behavior: Patient makes good eye contact and engages with normal rapport in the interview. There is no evidence of responding to hallucinations or flashbacks.  Speech: Speech is spontaneous and coherent, with a normal rate, rhythm and tone.    Language: There are no difficulties with expressive or receptive language as observed throughout the interview.    Mood: Described as \"sad\".    Affect: Congruent and shows a normal range and level of reactivity.  Judgement: Able to make basic decision regarding safety.  Insight: Good awareness of physical and mental health conditions and aware of needs around care for these.  Gait and station: unable to assess   Thought process: Logical   Thought content: No evidence of delusions or paranoia.  Passive suicidal ideation with no plan, intent or motivation. No thoughts of harming others.  Associations: Connected  Fund of knowledge: Average  Attention / Concentration: Difficulty remaining focused during the interview with distractibility and some need for redirection.  Short Term Memory: Grossly intact as evidence by client recalling themes and ideas discussed. Reports difficulty.  Long Term Memory: Intact  Motor Status: No recent apparent change.  No current tremor.    --  Diagnostic Impression:  1. Current severe episode of major " depressive disorder without psychotic features without prior episode (H)  - DAM    (Drug Abuse 1+, Urine); Future  - Ethyl Glucuronide and Ethyl Sulfate, Urine, Quantitative ([facility] ETG/S)  - buPROPion (WELLBUTRIN XL) 150 MG 24 hr tablet; Take 1 tablet (150 mg total) by mouth daily.  Dispense: 30 tablet; Refill: 0  - propranoloL (INDERAL) 10 MG tablet; Take 1 tablet (10 mg total) by mouth 3 (three) times a day.  Dispense: 90 tablet; Refill: 0  - AMB REFERRAL TO MENTAL HEALTH AND ADDICTION  - Adult (18+); Outpatient Treatment; Dialectical Behavior Therapy;  [facility]    --  Medical Decision-Making   Myrna Martinez is a 30 y.o. female who presents for initiation of care. Information today obtained from patient's verbal report and review of records available in EHR. Referred by Daniela العراقي due to worsening symptoms. Past medication trials include: vistaril, lorazepam, Celexa, and Zoloft.     Overt depressive and anxiety symptoms. Currently using marijuana several times a day with verbalized desire to quit. Several symptoms consistent with marijuana effects. Unclear relation versus organic illness. Spoke at length regarding illness, symptoms and treatment options. Will consider neuropsych testing and/or ADHD testing after sobriety from marijuana as this is potentially confounding factor. Initiate bupropion today as symptoms are primarily depressive in nature. Will also prescribe PRN propranolol three times a day for breakthrough anxiety as these symptoms are primarily physical in nature. Reviewed recent lab work and no further orders indicated at this time. Requested urine sample for drug and alcohol monitoring but LPN reported patient handed her water instead of urine sample. Will re-approach in future.     Placed referral to DBT program as this is her first outpatient experience and she would benefit from nonpharmacologic management of illness. Consider individual therapy  appointments in future.    Plan to follow up in 2 weeks for evaluation of current medication trials, lab work, and ongoing psychiatric assessment. Patient educated that they may schedule sooner appointment or contact writer for any worsening or lack of improvement in symptoms.     Moderate risk. Suicidal ideation with no plan, intent or motivation to harm self today. Patient denies homicidal ideation. Not at imminent risk this visit. Educated on need to seek emergent services should they become a risk to themselves or others. Myrna Martinez verbalized understanding and agreement with this safety plan.    Rule out: bipolar disorder, MDD, NICHOLE, panic disorder, cannabis use disorder, substance induced depressive/anxiety disorder, and personality disorder.    --  Plan  1. Continue to monitor for safety  2. Current Medications  1. INITIATE bupropion (Wellbutrin XL) 150mg every morning for depression  2. INITIATE propranolol 10mg three times a day PRN for anxiety  3. Neuroleptic Consent Form Signed n/a  4. Non-Opioid Contract Agreement Form Signed n/a  5. REFILLS: bupropion and propranolol  1. Labs ordered this visit:  DAM and ETG  2. INITIATE DBT for mood stabilization and nonpharmacologic coping. Collaborate with interdisciplinary care team as needed.  3. ROIs: unable to complete today virtually.   4. Consent to Communicate: unable to complete today virtually.   3. Patient will continue abstinence from drugs and alcohol  4. Patient to return to clinic in 2 weeks for evaluation of medication trials and continued assessment. Ongoing patient psychoeducation regarding chronic illness and treatment .  5. I reviewed the potential risks, side effects, and benefits of all medications with the patient. Patient verbalized understanding and was encouraged to call clinic with further questions or concerns.  --  START TIME: 9:40 AM  END TIME: 10:40 AM    Video call duration: 60 minutes with > 75% spent on coordination of care and  psycho-education regarding illnesses, symptoms, medications, care planning, treatment options, genetic basis of disease, substance use impact, and neurobiologic basis of illness    Dr. Margarita Rodriguez, DNP, APRN, PMHNP-BC  Nurse Practitioner - Psychiatry    This medical report was made using Dragon Dictation. Spelling and grammatical errors with Dragon exist and are not intentional.

## 2021-06-11 NOTE — PROGRESS NOTES
This video/telephone visit will be conducted via a call between you and your physician/provider. We have found that certain health care needs can be provided without the need for an in-person physical exam. This service lets us provide the care you need with a video /telephone conversation. If a prescription is necessary we can send it directly to your pharmacy. If lab work is needed we can place an order for that and you can then stop by our lab to have the test done at a later time.    Just as we bill insurance for in-person visits, we also bill insurance for video/telephone visits. If you have questions about your insurance coverage, we recommend that you speak with your insurance company.    Patient has given verbal consent for video/Telephone visit? Yes  Patient would like the video visit invitation sent by:  Text to cell:  [phone]    JAMIL/ERIC WORRELL CMA    Patient verified allergies, medications and pharmacy via phone. PHQ: 19 and NICHOLE: 17 done verbally with writer. Patient states she is ready for visit.    No [place]  available or review

## 2021-06-11 NOTE — PROGRESS NOTES
________________________________________  Medications Phoned  to Pharmacy [] yes [x]no  Name of Pharmacist:  List Medications, including dose, quantity and instructions    Medications ordered this visit were e-scribed.  Verified by order class [x] yes  [] no  Inderal and Wellbutrin  Medication changes or discontinuations were communicated to patient's pharmacy: [] yes  [x] no    Dictation completed at time of chart check: [] yes  [x] no    I have checked the documentation for today s encounters and the above information has been reviewed and completed.

## 2021-06-11 NOTE — PATIENT INSTRUCTIONS - HE
"1. Schedule DBT intake. The will call you later this week to schedule.  2. START bupropion 150mg every morning for depression  3. START propranolol 10mg three times a day for anxiety as needed  4. Continue medications as prescribed  5. Have your pharmacy contact us for a refill if you are running low on medications (We may ask you to come into clinic to get a refill from the nurse)  6. No alcohol or drug use  7. No driving if sedated  8. Contact the clinic with any questions or concerns   a. Phone: [phone]  b. Fax: [phone]  9. Reach out for help if you feel like hurting yourself or others:   a. [facility]: [address] [place] [address], [address] (phone: [phone])  b. [facility] Suicide Hotline: [phone]   c. Crisis Texting Line: Text \"[place]\" to [phone]  d. Call 911 or go to nearest Emergency room   10. Follow up as directed in 2 weeks, for your appointments, per your After Visit Summary Form    "

## 2021-06-12 NOTE — PROGRESS NOTES
"Myrna Martinez is a 30 y.o. female who is being evaluated via a billable video visit.      The patient has been notified of following:     \"This video visit will be conducted via a call between you and your physician/provider. We have found that certain health care needs can be provided without the need for an in-person physical exam.  This service lets us provide the care you need with a video conversation.  If a prescription is necessary we can send it directly to your pharmacy.  If lab work is needed we can place an order for that and you can then stop by our lab to have the test done at a later time.    Video visits are billed at different rates depending on your insurance coverage. Please reach out to your insurance provider with any questions.    If during the course of the call the physician/provider feels a video visit is not appropriate, you will not be charged for this service.\"    Patient has given verbal consent to a Video visit? Yes  How would you like to obtain your AVS? AVS Preference: MyChart.  If dropped by the video visit, the video invitation should be sent to: Text to cell phone: [phone]  Will anyone else be joining your video visit? No        Video Start Time: 8:34 AM    Additional provider notes:   Myrna Martinez 30 y.o.. female with   Chief Complaint   Patient presents with     Vaginal Bleeding     has Nexplanon, has been bleeding/spotting now for 3-4 weeks        S:    Irregular vaginal bleeding x 3 wks  Nexplanon was placed almost 3 years ago (due for removal in 2 months)  Typically has not had menstrual bleed following insertion of the nexplanon except for a few spotting days here and there  Not concerned about STDs. Has been with the same partner for some time      O:  Constitutional: Patient is oriented to person, place, and time. Patient appears well-developed and well-nourished. No distress.   Head: Normocephalic and atraumatic.   Right Ear: External ear normal.   Left Ear: " External ear normal.   Nose: Nose normal.   Eyes: Conjunctivae and EOM are normal. Right eye exhibits no discharge. Left eye exhibits no discharge. No scleral icterus.   Neurological: Patient is alert and oriented to person, place, and time.   The patient has good eye contact.  No psychomotor retardation or stereotypical behaviors.  Speech was regular rate, regular rhythm, adequate responses.  Mood was stable and affect was congruent mood.  No suicidal or homicidal intent.  No hallucination.      A/P:  Diagnoses and all orders for this visit:    Vaginal bleeding  We spoke about possible causes of irregular vaginal bleeding:  Nexplanon, pregnancy, vaginal infection, uterine pathology, hormonal.   We also spoke about management:  NSAIDs, oral contraceptives, or removal of nexplanon  She will take a pregnancy test at home  She wants to try NSAIDs (IBU 800mg three times a day x 10d)  Nexplanon will be due to removal in 2 months; she will think about her options.      Video-Visit Details    Type of service:  Video Visit    Video End Time (time video stopped): 8:49 AM  Originating Location (pt. Location): Home    Distant Location (provider location):  [facility]     Platform used for Video Visit: Brittany Dempsey MD

## 2021-06-13 NOTE — PROGRESS NOTES
"Myrna Martinez is a 30 y.o. female who is being evaluated via a billable telephone visit.      The patient has been notified of following:     \"This telephone visit will be conducted via a call between you and your physician/provider. We have found that certain health care needs can be provided without the need for a physical exam.  This service lets us provide the care you need with a short phone conversation.  If a prescription is necessary we can send it directly to your pharmacy.  If lab work is needed we can place an order for that and you can then stop by our lab to have the test done at a later time.    Telephone visits are billed at different rates depending on your insurance coverage. During this emergency period, for some insurers they may be billed the same as an in-person visit.  Please reach out to your insurance provider with any questions.    If during the course of the call the physician/provider feels a telephone visit is not appropriate, you will not be charged for this service.\"    Patient has given verbal consent to a Telephone visit? Yes    What phone number would you like to be contacted at? [phone]    Patient would like to receive their AVS by AVS Preference: Trace.    Patient seeks telephone visit consultation today with regard to needing a note for work due to a recent positive COVID-19 PCR test.    She also needs some lidocaine ointment for an HSV flare.    As far as her COVID-19, she feels well and is not in any respiratory distress.  She started experiencing symptoms on December 3.  She is afebrile currently.  No cough.  No body aches.    She does have some perineal lesions due to her HSV.  She uses some lidocaine ointment on these lesions when she has a flare, and finds it quite beneficial.    Assessment/Plan:  1. HSV (herpes simplex virus) infection  - lidocaine (XYLOCAINE) 5 % ointment; Apply to perineal region three times daily for ten days  Dispense: 35.44 g; Refill: " 0    2. COVID-19  A note was provided to her today.  She should not return to work before December 12.        Phone call duration:  9 minutes    Ely Nicole, [facility]

## 2021-06-13 NOTE — PATIENT INSTRUCTIONS - HE
"1. Continue medications as prescribed  2. Have your pharmacy contact us for a refill if you are running low on medications (We may ask you to come into clinic to get a refill from the nurse)  3. No alcohol or drug use  4. No driving if sedated  5. Contact the clinic with any questions or concerns   a. Phone: [phone]  b. Fax: [phone]  6. Reach out for help if you feel like hurting yourself or others:   a. [facility]: [address] [place] [address], [address] (phone: [phone])  b. [facility] Suicide Hotline: [phone]   c. Crisis Texting Line: Text \"[place]\" to 253132  d. Call 911 or go to nearest Emergency room   7. Follow up as directed in 3 weeks in clinic with provider for your appointment    "

## 2021-06-13 NOTE — PROGRESS NOTES
"Myrna Martinez is a 30 y.o. female who is being evaluated via a billable telephone visit.      The patient has been notified of following:     \"This telephone visit will be conducted via a call between you and your physician/provider. We have found that certain health care needs can be provided without the need for a physical exam.  This service lets us provide the care you need with a short phone conversation.  If a prescription is necessary we can send it directly to your pharmacy.  If lab work is needed we can place an order for that and you can then stop by our lab to have the test done at a later time.    Telephone visits are billed at different rates depending on your insurance coverage. During this emergency period, for some insurers they may be billed the same as an in-person visit.  Please reach out to your insurance provider with any questions.    If during the course of the call the physician/provider feels a telephone visit is not appropriate, you will not be charged for this service.\"    Patient has given verbal consent to a Telephone visit? Yes    What phone number would you like to be contacted at? [phone]    Patient would like to receive their AVS by AVS Preference: Trace.    Additional provider notes:     Outpatient Psychiatric Follow Up    Date of Service: 11/10/2020    --  Chief Complaint: \"I'm aware of my issues, but I feel alone, I don't know\"     --  History of Present Illness/Client Impression of Mental Health Consult:    Myrna Martinez is a 30 y.o. female who presents for follow up appointment. Last visit occurred 10/23/2020. At that time initiated bupropion and propranolol PRN.     Noticed less sex drive and improvements in depression when taking bupropion. Out of medications for last week and noticed symptoms worsened. Reports using a couple times since last visit and felt guilty after remaining sober for 3 weeks. Feeling overwhelmed with multiple competing responsibilities. " "    States mood is \"anxious\". Rates depression and anxiety worsened. No new sleep or energy maintenance concerns. No appetite or weight concerns. No suicidal and homicidal ideation. No overt psychosis. Denies all other psychiatric symptoms. No new physical concerns.     Medication adherence: Reviewed risk/benefits of medication , Patient able to verbalize understanding of side effects  and Patient verbally consents to taking medications  Medication side effects: none (when taking as prescribed)  The patient was given information on medications: currently prescribed    --  [place] Prescription Monitoring Program  No worrisome pharmacy activity.     --  Clinical Outcomes Measures:  PHQ-9 Total Score: 15  NICHOLE-7: 14    --  Current Medications:  Current Outpatient Medications   Medication Sig Dispense Refill     acyclovir (ZOVIRAX) 400 MG tablet Take 400 mg by mouth every 4 (four) hours while awake.       etonogestrel (NEXPLANON) 68 mg Impl implant Inject 68 mg under the skin.       ketoconazole (NIZORAL) 2 % cream Apply topically 2 (two) times a day. To affected lesions. May use for up to 1 month to see full response. 60 g 0     buPROPion (WELLBUTRIN XL) 150 MG 24 hr tablet Take 1 tablet (150 mg total) by mouth daily. 30 tablet 0     propranoloL (INDERAL) 10 MG tablet Take 1 tablet (10 mg total) by mouth 3 (three) times a day. 90 tablet 0     No current facility-administered medications for this visit.        --  Allergies  No Known Allergies  --  Summary of Diagnostic Studies  No visits with results within 30 Day(s) from this visit.   Latest known visit with results is:   Office Visit on 07/06/2020   Component Date Value Ref Range Status     Chlamydia trachomatis, Amplified D* 07/06/2020 Negative  Negative Final     Neisseria gonorrhoeae, Amplified D* 07/06/2020 Negative  Negative Final     Hepatitis C Ab 07/06/2020 Negative  Negative Final     HIV Antigen / Antibody 07/06/2020 Negative  Negative Final     Treponema " Antibody (Syphilis) 07/06/2020 Negative  Negative Final     WBC 07/06/2020 10.6  4.0 - 11.0 thou/uL Final     RBC 07/06/2020 4.34  3.80 - 5.40 mill/uL Final     Hemoglobin 07/06/2020 13.9  12.0 - 16.0 g/dL Final     Hematocrit 07/06/2020 41.6  35.0 - 47.0 % Final     MCV 07/06/2020 96  80 - 100 fL Final     MCH 07/06/2020 32.1  27.0 - 34.0 pg Final     MCHC 07/06/2020 33.5  32.0 - 36.0 g/dL Final     RDW 07/06/2020 10.6* 11.0 - 14.5 % Final     Platelets 07/06/2020 285  140 - 440 thou/uL Final     MPV 07/06/2020 7.3  7.0 - 10.0 fL Final     TSH 07/06/2020 0.36  0.30 - 5.00 uIU/mL Final     Vitamin D, Total (25-Hydroxy) 07/06/2020 35.0  30.0 - 80.0 ng/mL Final     Sodium 07/06/2020 140  136 - 145 mmol/L Final     Potassium 07/06/2020 3.8  3.5 - 5.0 mmol/L Final     Chloride 07/06/2020 104  98 - 107 mmol/L Final     CO2 07/06/2020 24  22 - 31 mmol/L Final     Anion Gap, Calculation 07/06/2020 12  5 - 18 mmol/L Final     Glucose 07/06/2020 73  70 - 125 mg/dL Final     BUN 07/06/2020 11  8 - 22 mg/dL Final     Creatinine 07/06/2020 0.75  0.60 - 1.10 mg/dL Final     GFR MDRD Af Amer 07/06/2020 >60  >60 mL/min/1.73m2 Final     GFR MDRD Non Af Amer 07/06/2020 >60  >60 mL/min/1.73m2 Final     Bilirubin, Total 07/06/2020 0.7  0.0 - 1.0 mg/dL Final     Calcium 07/06/2020 9.5  8.5 - 10.5 mg/dL Final     Protein, Total 07/06/2020 7.4  6.0 - 8.0 g/dL Final     Albumin 07/06/2020 4.4  3.5 - 5.0 g/dL Final     Alkaline Phosphatase 07/06/2020 70  45 - 120 U/L Final     AST 07/06/2020 18  0 - 40 U/L Final     ALT 07/06/2020 20  0 - 45 U/L Final       --  Review of Systems  Wt Readings from Last 3 Encounters:   07/06/20 153 lb 11.2 oz (69.7 kg)   03/12/20 147 lb 3.2 oz (66.8 kg)   03/11/20 147 lb (66.7 kg)     Temp Readings from Last 3 Encounters:   03/11/20 98.3  F (36.8  C) (Oral)   02/10/20 97.3  F (36.3  C) (Temporal)   01/08/20 98.2  F (36.8  C) (Oral)     BP Readings from Last 3 Encounters:   07/06/20 110/60   03/12/20 90/50  "  03/11/20 108/68     Pulse Readings from Last 3 Encounters:   07/06/20 88   03/11/20 86   02/10/20 90      There were no vitals taken for this visit. unable to assess today Pain Score: 0  Pain Location: n/a     As noted in the subjective section above, otherwise a 10 point review of systems is negative. Limited ability to assess given virtual nature of visit. Review of symptoms based entirely on patient's verbal report and what writer is able to assess via camera if used during appointment.    --  Mental Status Examination    Appearance: unable to assess  Orientation: Patient alert and oriented to person, place, time, and situation  Reliability:  Patient appears to be an adequate historian.    Behavior: unable to assess  Speech: Speech is spontaneous and coherent, with a normal rate, rhythm and tone.    Language:There are no difficulties with expressive or receptive language as observed throughout the interview.    Mood: Described as \"anxious\".    Affect: unable to assess  Judgement: Able to make basic decision regarding safety.  Insight: Good awareness of physical and mental health conditions and aware of needs around care for these.  Gait and station: unable to assess  Thought process: logical but tangential  Thought content: No evidence of delusions or paranoia.  No thoughts of self harm or suicide. No thoughts of harming others.  Associations: Connected  Fund of knowledge: Average  Attention / Concentration: Able to remain focused during the interview with minimal distractibility or need for redirection.  Short Term Memory: Grossly intact as evidence by client recalling themes and ideas discussed.  Long Term Memory: Intact  Motor Status: unable to assess    --  Diagnostic Impression:  1. Current severe episode of major depressive disorder without psychotic features without prior episode (H)  - buPROPion (WELLBUTRIN XL) 150 MG 24 hr tablet; Take 1 tablet (150 mg total) by mouth daily.  Dispense: 30 tablet; " Refill: 0  - propranoloL (INDERAL) 10 MG tablet; Take 1 tablet (10 mg total) by mouth 3 (three) times a day.  Dispense: 90 tablet; Refill: 0    --  Medical Decision-Making   Myrna Martinez is a 30 y.o. female who presents for ongoing outpatient psychiatric care. Information today obtained from patient's verbal report and review of records available in EHR. Referred by Daniela العراقي due to worsening symptoms. Past medication trials include: vistaril, lorazepam, Celexa, and Zoloft.     Overt depressive and anxiety symptoms with subjective reported improvements with medications. As not taking medications for last week it is more difficult to reassess. Plan to resume and assess next visit. Denies adverse effects from medications. Decreased marijuana use with verbalized desire to quit. Several symptoms consistent with marijuana effects which. Unclear relation versus organic illness. Spoke at length regarding illness, symptoms and treatment options. Will consider neuropsych testing and/or ADHD testing after sobriety from marijuana as this is potentially confounding factor. No other medication changes. Reviewed recent lab work and no further orders indicated at this time. Last visit placed referral to DBT program as this is her first outpatient experience and she would benefit from nonpharmacologic management of illness. Consider individual therapy appointments in future.    Moderate risk. Suicidal ideation with no plan, intent or motivation to harm self today. Patient denies homicidal ideation. Not at imminent risk this visit. Educated on need to seek emergent services should they become a risk to themselves or others. Myrna Martinez verbalized understanding and agreement with this safety plan.    --  Plan  1. Continue to monitor for safety  2. Current Medications  1. Continue bupropion (Wellbutrin XL) 150mg every morning for depression  2. Continue propranolol 10mg three times a day PRN for anxiety  3. REFILLS:  bupropion and propranolol  1. Labs ordered this visit:  n/a  2. INITIATE DBT for mood stabilization and nonpharmacologic coping. Consider individual therapy appointments. Collaborate with interdisciplinary care team as needed.  3. Patient will continue abstinence from drugs and alcohol  4. Patient to return to clinic in 3 weeks for evaluation of medication trials and continued assessment. Ongoing patient psychoeducation regarding chronic illness and treatment.   1. Patient educated that they may schedule sooner appointment or contact writer for any worsening or lack of improvement in symptoms.   5. I reviewed the potential risks, side effects, and benefits of all medications with the patient. Patient verbalized understanding and was encouraged to call clinic with further questions or concerns.  --  START TIME: 3:30 PM  END TIME: 4:00 PM    Video call duration: 30 minutes with > 75% spent on coordination of care and psycho-education regarding illnesses, symptoms, medications, care planning, treatment options, genetic basis of disease, substance use impact, and neurobiologic basis of illness    Dr. Margarita Rodriguez, DNP, APRN, PMHNP-BC  Nurse Practitioner - Psychiatry    This medical report was made using Dragon Dictation. Spelling and grammatical errors with Dragon exist and are not intentional.

## 2021-06-13 NOTE — TELEPHONE ENCOUNTER
Date of Last Office Visit: 11/10/20  Date of Next Office Visit: 12/9/20  No shows since last visit: none  Cancellations since last visit:11/27/20*  ED visits since last visit:  none  Medication propranolol 10mg date last ordered: 11/20/20  Qty: 90  Refills: 0  Lapse in therapy greater than 7 days: no  Medication refill request verified as identical to current order: yes  Result of Last DAM, VPA, Li+ Level, CBC, or Carbamazepine Level (at or since last visit): N/A     [x] Medication refilled per [facility] M-1.   [] Medication unable to be refilled by RN due to criteria not met as indicated below:     []Eligibility - not seen in last year    []Supervision - no future appointment    []Compliance     []Verification - order discrepancy    []Controlled Medication    []Medication not included in RN Protocol    []90 - day supply request    []Other   Current Medication list:    acyclovir (ZOVIRAX) 400 MG tablet Take 400 mg by mouth every 4 (four) hours while awake.   buPROPion (WELLBUTRIN XL) 150 MG 24 hr tablet Take 1 tablet (150 mg total) by mouth daily.   etonogestrel (NEXPLANON) 68 mg Impl implant Inject 68 mg under the skin.   ketoconazole (NIZORAL) 2 % cream Apply topically 2 (two) times a day. To affected lesions. May use for up to 1 month to see full response.   propranoloL (INDERAL) 10 MG tablet Take 1 tablet (10 mg total) by mouth 3 (three) times a day.       Medication Plan of Care at last office visit with MD/CNP:    Plan  1. Continue to monitor for safety  2. Current Medications  1. Continue bupropion (Wellbutrin XL) 150mg every morning for depression  2. Continue propranolol 10mg three times a day PRN for anxiety  3. REFILLS: bupropion and propranolol

## 2021-06-13 NOTE — PROGRESS NOTES
________________________________________  Medications Phoned  to Pharmacy [] yes [x]no  Name of Pharmacist:  List Medications, including dose, quantity and instructions    Medications ordered this visit were e-scribed.  Verified by order class [x] yes  [] no   hyrdoxyzine pamoate 25 mg;   Medication changes or discontinuations were communicated to patient's pharmacy: [] yes  [x] no    Dictation completed at time of chart check: [x] yes  [] no    I have checked the documentation for today s encounters and the above information has been reviewed and completed.

## 2021-06-15 NOTE — PROGRESS NOTES
"Telemedicine Visit: The patient's condition can be safely assessed and treated via synchronous audio and visual telemedicine encounter.      Reason for Telemedicine Visit: Patient unable to travel    Originating Site (Patient Location): Patient's other Emergency Department    Distant Site (Provider Location): Provider Remote Setting- Home Office    Consent:  The patient/guardian has verbally consented to: the potential risks and benefits of telemedicine (video visit) versus in person care; bill my insurance or make self-payment for services provided; and responsibility for payment of non-covered services.     Mode of Communication:  Video Conference via [employer]    As the provider I attest to compliance with applicable laws and regulations related to telemedicine.    Outpatient Psychiatric Follow Up    Date of Service: 3/10/2021    --  Chief Complaint: \"I'm aware of my issues, but I feel alone, I don't know\"     --  History of Present Illness/Client Impression of Mental Health Consult:    Myrna Martinez is a 30 y.o. female who presents for follow up appointment. Last visit occurred 11/10/20.     Suicidal ideation with no plan, motivation, or intent. Feeling overwhelmed and like she kept up with treatment planned for 3 weeks after last appointment but then \"fell off the deep end after that so I really want to talk with you\". Tells writer son is getting admitted so she is still calling from ED today. Tells writer she had felt pride in sobriety 3 weeks following visit. After felt like things worsened. Was hanging out with bad people, using \"acid\" and \"coke\", and attempted to end her life when intoxicated. During use sometimes not sure what was real or not and feeling more afraid of people she was with. Lost job. Afterwards describes feeling like experience was life altering and motivated change.    Cut ties with these people including  from boyfriend and stopped use of everything (with exception of " "marijuana) approximately 4 weeks ago. Presenting today feeling \"motivated to do better\". Expresses interested in doing program that she would be with others going through similar things. Ongoing feeling of worry and fearful thinking. Now living with son in home of grandfather of her son. Planning to move to [place] with mother and son next week. In process of applying for unemployment and asks if she can send paperwork. Would like to restart medications. Ongoing depressive, anxious, passive suicidal thinking, and difficulty concentrating. 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    States mood is \"anxious\". Rates depression and anxiety worsened. No new sleep or energy maintenance concerns. No appetite or weight concerns. No other suicidal and homicidal ideation. No overt psychosis. Denies all other psychiatric symptoms. No new physical concerns.     Medication adherence: Reviewed risk/benefits of medication , Patient able to verbalize understanding of side effects  and Patient verbally consents to taking medications  Medication side effects: none   The patient was given information on medications: currently prescribed    --  [place] Prescription Monitoring Program  No worrisome pharmacy activity.     --  Clinical Outcomes Measures:   PHQ-9: 17 on 3/9/21  NICHOLE-7: 17 on 3/9/21    --  Current Medications:  Current Outpatient Medications   Medication Sig Dispense Refill     acyclovir (ZOVIRAX) 400 MG tablet Take 400 mg by mouth every 4 (four) hours while awake.       etonogestrel (NEXPLANON) 68 mg Impl implant Inject 68 mg under the skin.       ketoconazole (NIZORAL) 2 % cream Apply topically 2 (two) times a day. To affected lesions. May use for up to 1 month to see full response. 60 g 0     lidocaine (XYLOCAINE) 5 % ointment Apply to perineal region three times daily for ten days 35.44 g 0     buPROPion (WELLBUTRIN XL) 150 MG 24 hr tablet Take 1 tablet (150 mg total) by mouth daily. " 30 tablet 0     propranoloL (INDERAL) 10 MG tablet Take 1 tablet (10 mg total) by mouth 3 (three) times a day. 90 tablet 0     No current facility-administered medications for this visit.        --  Allergies  No Known Allergies  --  Summary of Diagnostic Studies  No visits with results within 30 Day(s) from this visit.   Latest known visit with results is:   Office Visit on 07/06/2020   Component Date Value Ref Range Status     Chlamydia trachomatis, Amplified D* 07/06/2020 Negative  Negative Final     Neisseria gonorrhoeae, Amplified D* 07/06/2020 Negative  Negative Final     Hepatitis C Ab 07/06/2020 Negative  Negative Final     HIV Antigen / Antibody 07/06/2020 Negative  Negative Final     Treponema Antibody (Syphilis) 07/06/2020 Negative  Negative Final     WBC 07/06/2020 10.6  4.0 - 11.0 thou/uL Final     RBC 07/06/2020 4.34  3.80 - 5.40 mill/uL Final     Hemoglobin 07/06/2020 13.9  12.0 - 16.0 g/dL Final     Hematocrit 07/06/2020 41.6  35.0 - 47.0 % Final     MCV 07/06/2020 96  80 - 100 fL Final     MCH 07/06/2020 32.1  27.0 - 34.0 pg Final     MCHC 07/06/2020 33.5  32.0 - 36.0 g/dL Final     RDW 07/06/2020 10.6* 11.0 - 14.5 % Final     Platelets 07/06/2020 285  140 - 440 thou/uL Final     MPV 07/06/2020 7.3  7.0 - 10.0 fL Final     TSH 07/06/2020 0.36  0.30 - 5.00 uIU/mL Final     Vitamin D, Total (25-Hydroxy) 07/06/2020 35.0  30.0 - 80.0 ng/mL Final     Sodium 07/06/2020 140  136 - 145 mmol/L Final     Potassium 07/06/2020 3.8  3.5 - 5.0 mmol/L Final     Chloride 07/06/2020 104  98 - 107 mmol/L Final     CO2 07/06/2020 24  22 - 31 mmol/L Final     Anion Gap, Calculation 07/06/2020 12  5 - 18 mmol/L Final     Glucose 07/06/2020 73  70 - 125 mg/dL Final     BUN 07/06/2020 11  8 - 22 mg/dL Final     Creatinine 07/06/2020 0.75  0.60 - 1.10 mg/dL Final     GFR MDRD Af Amer 07/06/2020 >60  >60 mL/min/1.73m2 Final     GFR MDRD Non Af Amer 07/06/2020 >60  >60 mL/min/1.73m2 Final     Bilirubin, Total 07/06/2020 0.7   "0.0 - 1.0 mg/dL Final     Calcium 07/06/2020 9.5  8.5 - 10.5 mg/dL Final     Protein, Total 07/06/2020 7.4  6.0 - 8.0 g/dL Final     Albumin 07/06/2020 4.4  3.5 - 5.0 g/dL Final     Alkaline Phosphatase 07/06/2020 70  45 - 120 U/L Final     AST 07/06/2020 18  0 - 40 U/L Final     ALT 07/06/2020 20  0 - 45 U/L Final       --  Review of Systems  Wt Readings from Last 3 Encounters:   07/06/20 153 lb 11.2 oz (69.7 kg)   03/12/20 147 lb 3.2 oz (66.8 kg)   03/11/20 147 lb (66.7 kg)     Temp Readings from Last 3 Encounters:   03/11/20 98.3  F (36.8  C) (Oral)   02/10/20 97.3  F (36.3  C) (Temporal)   01/08/20 98.2  F (36.8  C) (Oral)     BP Readings from Last 3 Encounters:   07/06/20 110/60   03/12/20 90/50   03/11/20 108/68     Pulse Readings from Last 3 Encounters:   07/06/20 88   03/11/20 86   02/10/20 90      LMP 03/03/2021  unable to assess today Pain Score: 0  Pain Location: n/a     As noted in the subjective section above, otherwise a 10 point review of systems is negative. Limited ability to assess given virtual nature of visit. Review of symptoms based entirely on patient's verbal report and what writer is able to assess via camera if used during appointment.    --  Mental Status Examination       Appearance: Appears stated age, well groomed with good hygiene, and some make up.   Orientation: Patient alert and oriented to person, place, time, and situation  Reliability:  Patient appears to be an adequate historian.   Behavior: Patient makes good eye contact and engages with normal rapport in the interview. There is no evidence of responding to hallucinations or flashbacks. Tearful at times.  Speech: Speech is spontaneous and coherent, with a normal rate, rhythm and tone.    Language: There are no difficulties with expressive or receptive language as observed throughout the interview.    Mood: Described as \"anxious\".    Affect: Congruent and shows a normal range and level of reactivity.  Judgement: Able to make " basic decision regarding safety.  Insight: Good awareness of physical and mental health conditions and aware of needs around care for these.  Gait and station: unable to assess   Thought process: Logical   Thought content: No evidence of delusions or paranoia.  Passive suicidal ideation with no plan, intent or motivation. No thoughts of harming others.  Associations: Connected  Fund of knowledge: Average  Attention / Concentration: Difficulty remaining focused during the interview with distractibility and some need for redirection.  Short Term Memory: Grossly intact as evidence by client recalling themes and ideas discussed. Reports difficulty.  Long Term Memory: Intact  Motor Status: No recent apparent change.  No current tremor.    --  Diagnostic Impression:  1. Current severe episode of major depressive disorder without psychotic features without prior episode (H)  - buPROPion (WELLBUTRIN XL) 150 MG 24 hr tablet; Take 1 tablet (150 mg total) by mouth daily.  Dispense: 30 tablet; Refill: 0  - propranoloL (INDERAL) 10 MG tablet; Take 1 tablet (10 mg total) by mouth 3 (three) times a day.  Dispense: 90 tablet; Refill: 0  - AMB REFERRAL TO MENTAL HEALTH AND ADDICTION  - Adult (18+); Assessment and Testing; Chemical Health Assessment;  [facility] [phone]; Patient call to schedule    2. Cannabis use disorder, moderate, dependence (H)  - AMB REFERRAL TO MENTAL HEALTH AND ADDICTION  - Adult (18+); Assessment and Testing; Chemical Health Assessment;  [facility] [phone]; Patient call to schedule    3. Substance use  - AMB REFERRAL TO MENTAL HEALTH AND ADDICTION  - Adult (18+); Assessment and Testing; Chemical Health Assessment; [facility] [phone]; Patient call to schedule    --  Medical Decision-Making   Myrna Martinez is a 30 y.o. female who presents for ongoing outpatient psychiatric care. Information today obtained from patient's verbal report  and review of records available in EHR. Referred by Daniela العراقي due to worsening symptoms. Medically complex: has Major Depression, Single Episode; Anxiety; Pap Smear (+) Low Grade Squamous Intraepithelial Lesion; Genital herpes; Allergic rhinitis; Anemia; Vaginal pain; and Migraine on their problem list. Past medication trials include: vistaril, lorazepam, Celexa, and Zoloft.     Overt depressive and anxiety symptoms with subjective reported improvements with medications. Not taking medications in last couple months following decompensation and substance use. Plan to resume medications and assess next visit. Denies adverse effects from medications. Decreased marijuana use with verbalized desire to quit. Discontinued use of cocaine and acid reported prior to 4 weeks ago. Will refer for Rule 25 and recommend MICD outpatient group treatment. Spoke at length regarding illness, symptoms and treatment options. Will consider neuropsych testing and/or ADHD testing after sobriety from marijuana as this is potentially confounding factor. No other medication changes. Reviewed recent lab work and no further orders indicated at this time. Consider individual therapy appointments in future.    Moderate risk. Suicidal ideation with no plan, intent or motivation to harm self today. Patient denies homicidal ideation. Not at imminent risk this visit. Educated on need to seek emergent services should they become a risk to themselves or others. Myrna Martinez verbalized understanding and agreement with this safety plan.    --  Plan  1. Continue to monitor for safety  2. Current Medications  1. Continue bupropion (Wellbutrin XL) 150mg every morning for depression  2. Continue propranolol 10mg three times a day PRN for anxiety  3. REFILLS: see above  1. Labs ordered this visit:  n/a  2. INITIATE Rule 25 for evaluation of substance use and recommendations for MICD treatment. Recommend outpatient group programing for mood  stabilization and nonpharmacologic coping. Consider individual therapy appointments. Collaborate with interdisciplinary care team as needed.  3. Patient will continue abstinence from drugs and alcohol  4. Patient to return to clinic in 2 weeks for evaluation of medication trials and continued assessment. Ongoing patient psychoeducation regarding chronic illness and treatment.   1. Patient educated that they may schedule sooner appointment or contact writer for any worsening or lack of improvement in symptoms.   5. I reviewed the potential risks, side effects, and benefits of all medications with the patient. Patient verbalized understanding and was encouraged to call clinic with further questions or concerns.  --  START TIME: 8:30 AM  END TIME: 9:15 AM    Video call duration: 45 minutes with > 75% spent on coordination of care and psycho-education regarding illnesses, symptoms, medications, care planning, treatment options, genetic basis of disease, substance use impact, and neurobiologic basis of illness    Margarita Dinero, DNP, APRN, PMHNP-BC  Nurse Practitioner - Psychiatry    This medical report was made using Dragon Dictation. Spelling and grammatical errors with Dragon exist and are not intentional.

## 2021-06-15 NOTE — PROGRESS NOTES
This video/telephone visit will be conducted via a call between you and your physician/provider. We have found that certain health care needs can be provided without the need for an in-person physical exam. This service lets us provide the care you need with a video /telephone conversation. If a prescription is necessary we can send it directly to your pharmacy. If lab work is needed we can place an order for that and you can then stop by our lab to have the test done at a later time.    Just as we bill insurance for in-person visits, we also bill insurance for video/telephone visits. If you have questions about your insurance coverage, we recommend that you speak with your insurance company.    Patient has given verbal consent for video/Telephone visit? yes  Patient would like the video visit invitation sent by: Text to cell phone: [phone] Send to email: DOTTY or ROBERTO CALL to:  DOTTY  CMA/TERESAN : Jace ARCE LPN   Pt reports being out of Wellbutrin and Propranolol but looking forward tolking to provider.   Patient verified allergies, medications and pharmacy via phone. PHQ : 17 and NIHCOLE: 17 done yesterday. Patient states sheis ready for visit.    : Provider to review      _______________________________________  Medications Phoned  to Pharmacy [] yes [x]no  Name of Pharmacist:  List Medications, including dose, quantity and instructions    Medications ordered this visit were e-scribed.  Verified by order class [x] yes  [] no  Propranolol and Wellbutrin     Medication changes or discontinuations were communicated to patient's pharmacy: [] yes  [x] no    Dictation completed at time of chart check: [x] yes  [] no     I have checked the documentation for today s encounters and the above information has been reviewed and completed.

## 2021-06-15 NOTE — PATIENT INSTRUCTIONS - HE
"1. Schedule Rule 25 Assessment by calling Gina   2. Continue medications as prescribed  3. Have your pharmacy contact us for a refill if you are running low on medications (We may ask you to come into clinic to get a refill from the nurse)  4. No alcohol or drug use  5. No driving if sedated  6. Contact the clinic with any questions or concerns   a. Phone: [phone]  b. Fax: [phone]  7. Reach out for help if you feel like hurting yourself or others:   a. [facility]: [address] [place] [address], [address] (phone: [phone])  b. [facility] Suicide Hotline: [phone]   c. Crisis Texting Line: Text \"[place]\" to [phone]  d. Call 911 or go to nearest Emergency room   8. Follow up as directed in 2 weeks by video for your appointment    "

## 2021-06-15 NOTE — TELEPHONE ENCOUNTER
Reason for call:  Form   Our goal is to have forms completed within 72 hours, however some forms may require a visit or additional information.     Who is the form from? Patient     Where did the form come from? form was faxed in     What clinic location was the form placed at? Form faxed to [phone]    Where was the form placed? Unknown    What number is listed as a contact on the form? Unknown    Phone call message - patient request for a letter, form or note: pt would like to confirm receipt of an unemployment form they faxed in.    Date needed: unknown    Has the patient signed a consent form for release of information? unknown    Additional comments: NA    Type of letter, form or note: unemployment  Phone number to reach patient:  [phone]    Best Time:  ANY    Can we leave a detailed message on this number? YES

## 2021-06-15 NOTE — TELEPHONE ENCOUNTER
Spoke to patient as we have not received the forms.  She said she tried faxing them this morning from office max, but will try again.  Fax number was confirmed with patient.

## 2021-06-16 NOTE — TELEPHONE ENCOUNTER
I called and LVMTCB, if/when patient calls back please inform that all labs were normal/negative.

## 2021-06-16 NOTE — TELEPHONE ENCOUNTER
Will refill x2 weeks but will not consider further refills until re-scheduled as this was new medication.

## 2021-06-16 NOTE — TELEPHONE ENCOUNTER
Reason for Call:  Request for results:    Name of test or procedure: labs    Date of test of procedure: 3/18/2021    Location of the test or procedure: [facility]     OK to leave the result message on voice mail or with a family member? Yes    Phone number Patient can be reached at: Home number on file [phone] (home)    Additional comments:    Patient unable to access her MyChart and requesting a call with her results.    Call taken on 3/26/2021 at 12:01 PM by Laura L Goldberg

## 2021-06-16 NOTE — TELEPHONE ENCOUNTER
[facility]al [facility], please schedule this patient for a future visit. Patient is requesting a refill.     Date of Last Office Visit: 3/10/21 Patient to return to clinic in 2 weeks for evaluation of medication trials and continued assessment  Date of Next Office Visit: None.Scheduling attempting to contact pt  No shows since last visit: 0  Cancellations since last visit: 0    Medication requested: Wellburtin  Date last ordered: 3/10/21 Qty: 30 Refills: 0     buPROPion (WELLBUTRIN XL) 150 MG 24 hr tablet 30 tablet 0 3/10/2021  No   Sig - Route: Take 1 tablet (150 mg total) by mouth daily. - Oral       Review of [place] ?: NA      Lapse in medication adherence greater than 5 days?: No. According to pharmacy slip last filled on 3/24/21 for 30 days  If yes, call patient and gather details:NA Medication refill request verified as identical to current order?: yes  Result of Last DAM, VPA, Li+ Level, CBC, or Carbamazepine Level (at or since last visit): See labs from March ordered by another psysician    []Medication refilled per  Medication Refill in Ambulatory Care  policy.  []Medication unable to be refilled by RN due to criteria not met as indicated below:    []Eligibility - not seen in the last year   [x]Supervision - no future appointment   [x]Compliance - no shows, cancellations or lapse in therapy   []Verification - order discrepancy   []Controlled medication   []Medication not included in policy   []90-day supply request   [x]Other : LPN is processing request

## 2021-06-16 NOTE — PROGRESS NOTES
Myrna Martinez is a 30 y.o. female who presents for Nexplanon replacement.     Nexplanon Insertion Procedure Note    Pre-operative Diagnosis: Nexplanon Replacement    Indications: Contraception     Procedure Details   The risks (including infection, bleeding, pain) and benefits of the procedure were explained to the patient and verbal informed consent was obtained.      Pregnancy test was negative before starting the procedure. Consent was signed. The patient's left arm was palpated between the biceps and the triceps, Nexplanon was palpated. Three ccs of 1% lidocaine were infiltrated along the insertion william. Her arm was cleaned with betadine swabs x3. Scalpel was used to make a small incision at the insertion site. Tip of Nexplanon was located and grasped with forceps, then removed without difficulty. Device was inserted atraumatically and deployed without difficulty. The introducer was removed. Bleeding was minimal and controlled with pressure. The patient could palpate the device without difficulty.  A pressure dressing was placed on the arm. The patient tolerated the insertion well. She was instructed to leave the dressing on until tomorrow.    Nexplanon Information:   [id number]  EXP 4/2/2023  LOT [id number]    Condition:  Stable    Complications:  None    Plan:  The patient was advised to call for any fever or for prolonged or severe pain or bleeding. She was advised to use OTC ibuprofen as needed for mild to moderate pain.     Daniela العراقي NP

## 2021-06-20 NOTE — LETTER
Letter by Kelly Morocho MD at      Author: Kelly Morocho MD Service: -- Author Type: --    Filed:  Encounter Date: 1/8/2020 Status: Signed         January 8, 2020     Patient: Myrna Martinez   YOB: 1990   Date of Visit: 1/8/2020       To Whom it May Concern:    Myrna Martinez was seen in my clinic on 1/8/2020. She may return to work on Friday 1/10/19 if no fever.    If you have any questions or concerns, please don't hesitate to call.    Sincerely,         Electronically signed by Kelly Keenan MD

## 2021-06-20 NOTE — LETTER
Letter by Carlton Larson MD at      Author: Carlton Larson MD Service: -- Author Type: --    Filed:  Encounter Date: 9/4/2020 Status: (Other)         September 4, 2020     Patient: Myrna Martinez   YOB: 1990   Date of Visit: 9/4/2020       To Whom It May Concern:    It is my medical opinion that Myrna Martinez may return to work on Tuesday, September 8 with no restrictions.    If you have any questions or concerns, please don't hesitate to call.    Sincerely,        Electronically signed by Carlton Larson MD

## 2021-06-21 NOTE — LETTER
Letter by Sandee Ferguson RN at      Author: Sandee Ferguson RN Service: -- Author Type: --    Filed:  Encounter Date: 3/26/2021 Status: (Other)         Myrna Martinez  [address]  [place] [address]             March 26, 2021         Dear Ms. Martinez,    We are happy to inform you that your recent Pap smear is normal.    It is recommended that you have your next Pap smear in 3 years. You will also need to return to the clinic every year for an annual wellness visit.    If you have additional questions regarding this result, please contact our office and we will be happy to assist you.      Sincerely,    [facility]

## 2021-06-21 NOTE — LETTER
Letter by Myles Taylor CNP at      Author: Myles Taylor CNP Service: -- Author Type: --    Filed:  Encounter Date: 12/8/2020 Status: (Other)         December 8, 2020     Patient: Myrna Martinez   YOB: 1990   Date of Visit: 12/8/2020       To Whom It May Concern:    It is my medical opinion that Myrna Martinez should not return to work before December 12, 2020    If you have any questions or concerns, please don't hesitate to call.    Sincerely,        Electronically signed by Myles Taylor CNP

## 2021-06-25 NOTE — PROGRESS NOTES
Chief Complaint   Patient presents with     Vaginal Pain  x  1 month - ON/ OFF         Clinical Decision Making: Wet prep was negative today.  Patient was retested for gonorrhea and chlamydia, but no known exposures.  Patient was started on topical hydrocortisone for mild/intermittent vaginal irritation.  Recommend follow-up if STD testing is negative and symptoms persist.    1. Vaginal pain  Wet Prep, Vaginal    Chlamydia trachomatis by PCR, Non-genital    Neisseria gonorrhoeae by PCR    hydrocortisone 1 % cream         Patient Instructions   1. You will be notified of your Chlamydia and Gonorrhea test results when they become available.   2. Apply topical Hydrocortisone and Aloe cream to the opening of the vagina two times a day for 7 days.   3. Follow up if there I no improvement in symptoms after 5 days of treatment.        HPI:  Myrna Martinez is a 30 y.o. female who presents today complaining of vaginal irritation and pain for the past 1 to 2 months.  She was previously seen in mid March for her STD testing, Pap smear, and physical.  Her primary care provider felt that there was some friability of her cervix, so she was started on Flagyl.  There was no wet prep completed at that visit.  Patient admittedly did not complete the Flagyl.  She denies any new sexual partners, but has been sexually active with a partner that she has been sexually active with in an open relationship.  She is interested in repeat gonorrhea and Chlamydia testing.  She reports that past medical history of genital herpes, but states this does not feel at all related and she has not had any lesions.  She denies any vaginal discharge or odor.  She has not had any abdominal pain, fevers, chills, or abnormal vaginal bleeding.  She denies any urinary symptoms or dyspareunia.  Her vaginal discomfort seems to worsen after shower.    History obtained from the patient.    Problem List:  2021-03: Moderate major depression (H)  2020-07:  Migraine  2015-02: Vaginal pain  2015-01: Perineal abrasion  2015-01: Anemia  2015-01: Pregnant  2015-01: PROM (premature rupture of membranes)  2014-12: Genital herpes  2014-12: Rubella non-immune status, antepartum  2014-12: Pregnancy  2014-12: Allergic rhinitis  2014-12: Group B streptococcal infection in pregnancy  Herpes Simplex Type I  Major Depression, Single Episode  Anxiety  Fatigue  Breast Pain  Eustachian Tube Dysfunction  Amenorrhea  Pap Smear (+) Low Grade Squamous Intraepithelial Lesion  Ankle Sprain      Past Medical History:   Diagnosis Date     Anemia 1/12/2015     Ankle Sprain      Breast disorder     pain under breasts     Headaches, cluster      Herpes      Mental disorder     anxiety and depression     Pap Smear (+) Low Grade Squamous Intraepithelial Lesion      Perineal abrasion        Social History     Tobacco Use     Smoking status: Never Smoker     Smokeless tobacco: Never Used   Substance Use Topics     Alcohol use: Yes     Comment: 1 drink/month       Review of Systems   Constitutional: Negative for fever.   Gastrointestinal: Negative for abdominal pain, nausea and vomiting.   Genitourinary: Positive for vaginal pain. Negative for dyspareunia, dysuria, flank pain, frequency, hematuria, vaginal bleeding and vaginal discharge.       Vitals:    05/28/21 1232   BP: 117/74   Pulse: 73   Resp: 15   Temp: 98.9  F (37.2  C)   SpO2: 96%   Weight: 160 lb (72.6 kg)       Physical Exam  Vitals signs and nursing note reviewed.   Constitutional:       General: She is not in acute distress.     Appearance: She is well-developed. She is not diaphoretic.   HENT:      Head: Normocephalic and atraumatic.      Right Ear: External ear normal.      Left Ear: External ear normal.   Eyes:      General:         Right eye: No discharge.         Left eye: No discharge.      Conjunctiva/sclera: Conjunctivae normal.   Cardiovascular:      Rate and Rhythm: Normal rate.   Pulmonary:      Effort: Pulmonary effort is  normal. No respiratory distress.   Neurological:      Mental Status: She is alert.   Psychiatric:         Behavior: Behavior normal.         Thought Content: Thought content normal.         Judgment: Judgment normal.         Labs:  Recent Results (from the past 72 hour(s))   Wet Prep, Vaginal    Specimen: Genital   Result Value Ref Range    Yeast Result No yeast seen No yeast seen    Trichomonas No Trichomonas seen No Trichomonas seen    Clue Cells, Wet Prep No Clue cells seen No Clue cells seen       At the end of the encounter, I discussed results, diagnosis, medications. Discussed red flags for immediate return to clinic/ER, as well as indications for follow up if no improvement. Patient understood and agreed to plan. Patient was stable for discharge.

## 2021-06-26 NOTE — PATIENT INSTRUCTIONS - HE
1. You will be notified of your Chlamydia and Gonorrhea test results when they become available.   2. Apply topical Hydrocortisone and Aloe cream to the opening of the vagina two times a day for 7 days.   3. Follow up if there I no improvement in symptoms after 5 days of treatment.

## 2021-06-29 NOTE — PROGRESS NOTES
Progress Notes by Jacqui Glover CMA at 11/10/2020  3:30 PM     Author: Jacqui Glover CMA Service: -- Author Type: Certified Medical Assistant    Filed: 11/11/2020  8:31 AM Encounter Date: 11/10/2020 Status: Signed    : Jacqui Glover CMA (Certified Medical Assistant)       This video/telephone visit will be conducted via a call between you and your physician/provider. We have found that certain health care needs can be provided without the need for an in-person physical exam. This service lets us provide the care you need with a video /telephone conversation. If a prescription is necessary we can send it directly to your pharmacy. If lab work is needed we can place an order for that and you can then stop by our lab to have the test done at a later time.   Just as we bill insurance for in-person visits, we also bill insurance for video/telephone visits. If you have questions about your insurance coverage, we recommend that you speak with your insurance company.   Patient has given verbal consent for Telephone visit? Yes   Patient would like telephone visit please call: [phone]  JAMIL/ERIC VALENZUELA CMA    Pt is leaving store, but ready for appointment.      Patient verified allergies, medications and pharmacy via phone. PHQ: 15 and NICHOLE: 14 done verbally with writer.   Patient states she is ready for visit.  [place]  was reviewed prior to seeing patient today. See below for embedded report.

## 2021-06-29 NOTE — PROGRESS NOTES
Progress Notes by Jessica Torres LPN at 9/28/2020  9:30 AM     Author: Jessica Torres LPN Service: -- Author Type: Licensed Nurse    Filed: 9/28/2020  5:51 PM Encounter Date: 9/28/2020 Status: Signed    : Jessica Torres LPN (Licensed Nurse)       This video/telephone visit will be conducted via a call between you and your physician/provider. We have found that certain health care needs can be provided without the need for an in-person physical exam. This service lets us provide the care you need with a video /telephone conversation. If a prescription is necessary we can send it directly to your pharmacy. If lab work is needed we can place an order for that and you can then stop by our lab to have the test done at a later time.   Just as we bill insurance for in-person visits, we also bill insurance for video/telephone visits. If you have questions about your insurance coverage, we recommend that you speak with your insurance company.   Patient has given verbal consent for video/Telephone visit? yes  Patient would like the video visit invitation sent by: Text to cell phone: NA or Send to email: [email or web address]    JAMIL/LPN: AD, LPN   Previous med trials:   Celexa 20 mg  Lorazepam  Vistaril  Zoloft 100 and 50  Patient verified allergies, medications and pharmacy via phone.  PHQ : 16 and   NICHOLE: 17 .   Patient states she  is ready for visit.  Split with her boyfriend yesterday  Reports smoking THC every other day  Says she had a drink last nigh  Pt has problem with med compliance, prefers PRN meds  Pt was asked to provide a urine samples for UDS/Alcohol screen but she  returned cap filled with water.      : nothing to report

## 2021-07-06 VITALS
RESPIRATION RATE: 15 BRPM | HEART RATE: 73 BPM | SYSTOLIC BLOOD PRESSURE: 117 MMHG | BODY MASS INDEX: 28.12 KG/M2 | TEMPERATURE: 98.9 F | WEIGHT: 160 LBS | DIASTOLIC BLOOD PRESSURE: 74 MMHG | OXYGEN SATURATION: 96 %

## 2021-10-03 ENCOUNTER — HEALTH MAINTENANCE LETTER (OUTPATIENT)
Age: 31
End: 2021-10-03

## 2021-11-30 ENCOUNTER — OFFICE VISIT (OUTPATIENT)
Dept: FAMILY MEDICINE | Facility: CLINIC | Age: 31
End: 2021-11-30
Payer: COMMERCIAL

## 2021-11-30 VITALS — SYSTOLIC BLOOD PRESSURE: 110 MMHG | HEART RATE: 68 BPM | DIASTOLIC BLOOD PRESSURE: 62 MMHG

## 2021-11-30 DIAGNOSIS — F41.1 GAD (GENERALIZED ANXIETY DISORDER): ICD-10-CM

## 2021-11-30 DIAGNOSIS — F33.1 MAJOR DEPRESSIVE DISORDER, RECURRENT EPISODE, MODERATE (H): ICD-10-CM

## 2021-11-30 DIAGNOSIS — R09.81 CHRONIC NASAL CONGESTION: Primary | ICD-10-CM

## 2021-11-30 PROBLEM — G43.909 MIGRAINE: Status: ACTIVE | Noted: 2020-07-06

## 2021-11-30 PROBLEM — R87.612 PAPANICOLAOU SMEAR OF CERVIX WITH LOW GRADE SQUAMOUS INTRAEPITHELIAL LESION (LGSIL): Status: ACTIVE | Noted: 2021-11-30

## 2021-11-30 PROCEDURE — 99214 OFFICE O/P EST MOD 30 MIN: CPT | Performed by: NURSE PRACTITIONER

## 2021-11-30 RX ORDER — FLUTICASONE PROPIONATE 50 MCG
2 SPRAY, SUSPENSION (ML) NASAL DAILY
Qty: 9.9 ML | Refills: 1 | Status: SHIPPED | OUTPATIENT
Start: 2021-11-30 | End: 2023-11-14

## 2021-11-30 RX ORDER — CETIRIZINE HYDROCHLORIDE 10 MG/1
10 TABLET ORAL DAILY
Qty: 90 TABLET | Refills: 0 | Status: SHIPPED | OUTPATIENT
Start: 2021-11-30 | End: 2023-03-01

## 2021-12-01 NOTE — PROGRESS NOTES
"  Assessment & Plan     Chronic nasal congestion  I encouraged patient to use the Flonase and antihistamine persistently.  Referral placed for ENT to discuss additional workup.   - Otolaryngology Referral  - fluticasone (FLONASE) 50 MCG/ACT nasal spray  Dispense: 9.9 mL; Refill: 1  - cetirizine (ZYRTEC) 10 MG tablet  Dispense: 90 tablet; Refill: 0    Major depressive disorder, recurrent episode, moderate (H)  Mentally stable at this time, but will refer back to psychiatry to discuss formal testing and concerns regarding concentration.  - MENTAL HEALTH REFERRAL  - Adult; Psychiatry; Psychiatry; [facility] [phone]; We will contact you to schedule the appointment or please call with any questions    NICHOLE (generalized anxiety disorder)  - MENTAL HEALTH REFERRAL  - Adult; Psychiatry; Psychiatry; [facility] [phone]; We will contact you to schedule the appointment or please call with any questions       BMI:   Estimated body mass index is 28.12 kg/m  as calculated from the following:    Height as of 3/18/21: 1.607 m (5' 3.25\").    Weight as of 5/28/21: 72.6 kg (160 lb).       No follow-ups on file.    Daniela العراقي NP  [facility]    Lilian Norris is a 31 year old who presents with concerns regarding sinus congestion.  This has been a chronic issue for years.  She was seen by ENT about 5 years ago for this.  Patient has restricted breathing through her nose.  It alternates between nostrils, but seems worse on the right currently.  She feels much more congested in mornings.  Denies any rhinorrhea, nasal pain, bleeding, cough, sore throat, or PND.  She does get some facial pressure.  No history of facial trauma.  Patient had fairly normal sinus CT in 2016.  She had allergy testing and states she only showed allergies to ragweed.  She has a reduced sense of smell.  Flonase helps slightly, but she does not utilize consistently.  Does not " "consistently use an antihistamine.  She is requesting an ENT referral today.    Patient would also like to see psychiatry again.  She was seen one at one point, but looks like she \"no-showed\" a couple of appointments.  She has concerns regarding her concentration and motivation.  Not feeling significantly depressed or anxious at this time.     Patient declines a flu or COVID vaccination.    Review of Systems   Pertinent items in HPI      Objective    /62   Pulse 68   There is no height or weight on file to calculate BMI.  Physical Exam   GENERAL: healthy, alert and no distress  HENT: ear canals and TM's normal. Left nare erythematous and edematous, right nare normal.  NECK: no adenopathy, no asymmetry, masses, or scars and thyroid normal to palpation  RESP: lungs clear to auscultation - no rales, rhonchi or wheezes  CV: regular rate and rhythm, normal S1 S2, no S3 or S4, no murmur, click or rub, no peripheral edema  PSYCH: mentation appears normal, affect normal/bright          "

## 2021-12-28 ENCOUNTER — OFFICE VISIT (OUTPATIENT)
Dept: OTOLARYNGOLOGY | Facility: CLINIC | Age: 31
End: 2021-12-28
Attending: NURSE PRACTITIONER
Payer: COMMERCIAL

## 2021-12-28 DIAGNOSIS — R09.81 CHRONIC NASAL CONGESTION: ICD-10-CM

## 2021-12-28 PROCEDURE — 99243 OFF/OP CNSLTJ NEW/EST LOW 30: CPT | Performed by: OTOLARYNGOLOGY

## 2021-12-28 NOTE — LETTER
12/28/2021         RE: Myrna Martinez  [address]  [facility] [address]        Dear Colleague,    Thank you for referring your patient, Myrna Martinez, to the [facility]. Please see a copy of my visit note below.    HPI: This patient is a 32yo F who presents for evaluation of the nasal congestion at the request of Daniela العراقي NP. The patient reports nasal congestion that is most bothersome at night. It switches sides. She was evaluated by an ENT several years ago and noted to not have anything surgical. She has not seen an Allergist. Just started on flonase and tries to use it daily, but admits that she fails to remember every day. However, she does state that it has helped some. There have not really been episodes of high fever, localized sinus pain, tooth pain, and pururlent drainage. There is prolific nighttime dental grinding/clenching. Also states that her sense of smell does not seem as strong as some others.    Past medical history, surgical history, social history, family history, medications, and allergies have been reviewed with the patient and are documented above.    Review of Systems: a 10-system review was performed. Pertinent positives are noted in the HPI and on a separate scanned document in the chart.    PHYSICAL EXAMINATION:  GEN: no acute distress, normocephalic  EYES: extraocular movements are intact, pupils are equal and round. Sclera clear.   EARS: auricles are normally formed. The external auditory canals are clear with minimal to no cerumen. Tympanic membranes are intact bilaterally with no signs of infection, effusion, retractions, or perforations.  NOSE: anterior nares are patent. There are no masses or lesions. The septum is non-obstructing. Inferior turbinates with mild inflammation, right is a bit more engorged than left.   OC/OP: clear, dentition is in good repair. The tongue and palate are fully mobile and symmetric. No masses or  lesions.  NECK: soft and supple. No lymphadenopathy or masses. Airway is midline.  NEURO: CN VII and XII symmetric. alert and oriented. No spontaneous nystagmus. Gait is normal.  PULM: breathing comfortably on room air, normal chest expansion with respiration  CARDS: no cyanosis or clubbing. Normal carotid pulses.    CT SINUS 2016: scant mucosal thickening    MEDICAL DECISION-MAKING: This patient is a 32yo F with probable allergic rhinitis and congestion. Discussed proper use of nasal steroid sprays and antihistamines. Will send for an updated CT sinus to be sure that there hasn't been progression of the mucosal thickening. Will call her with the results.          Again, thank you for allowing me to participate in the care of your patient.        Sincerely,        Lauren Ludwig MD

## 2021-12-28 NOTE — PROGRESS NOTES
HPI: This patient is a 32yo F who presents for evaluation of the nasal congestion at the request of Daniela العراقي NP. The patient reports nasal congestion that is most bothersome at night. It switches sides. She was evaluated by an ENT several years ago and noted to not have anything surgical. She has not seen an Allergist. Just started on flonase and tries to use it daily, but admits that she fails to remember every day. However, she does state that it has helped some. There have not really been episodes of high fever, localized sinus pain, tooth pain, and pururlent drainage. There is prolific nighttime dental grinding/clenching. Also states that her sense of smell does not seem as strong as some others.    Past medical history, surgical history, social history, family history, medications, and allergies have been reviewed with the patient and are documented above.    Review of Systems: a 10-system review was performed. Pertinent positives are noted in the HPI and on a separate scanned document in the chart.    PHYSICAL EXAMINATION:  GEN: no acute distress, normocephalic  EYES: extraocular movements are intact, pupils are equal and round. Sclera clear.   EARS: auricles are normally formed. The external auditory canals are clear with minimal to no cerumen. Tympanic membranes are intact bilaterally with no signs of infection, effusion, retractions, or perforations.  NOSE: anterior nares are patent. There are no masses or lesions. The septum is non-obstructing. Inferior turbinates with mild inflammation, right is a bit more engorged than left.   OC/OP: clear, dentition is in good repair. The tongue and palate are fully mobile and symmetric. No masses or lesions.  NECK: soft and supple. No lymphadenopathy or masses. Airway is midline.  NEURO: CN VII and XII symmetric. alert and oriented. No spontaneous nystagmus. Gait is normal.  PULM: breathing comfortably on room air, normal chest expansion with respiration  CARDS:  no cyanosis or clubbing. Normal carotid pulses.    CT SINUS 2016: scant mucosal thickening    MEDICAL DECISION-MAKING: This patient is a 30yo F with probable allergic rhinitis and congestion. Discussed proper use of nasal steroid sprays and antihistamines. Will send for an updated CT sinus to be sure that there hasn't been progression of the mucosal thickening. Will call her with the results.

## 2022-01-18 VITALS
SYSTOLIC BLOOD PRESSURE: 127 MMHG | BODY MASS INDEX: 29.25 KG/M2 | WEIGHT: 165.1 LBS | DIASTOLIC BLOOD PRESSURE: 78 MMHG | HEART RATE: 93 BPM | HEIGHT: 63 IN

## 2022-01-18 VITALS
WEIGHT: 147.2 LBS | SYSTOLIC BLOOD PRESSURE: 90 MMHG | DIASTOLIC BLOOD PRESSURE: 50 MMHG | HEIGHT: 62 IN | BODY MASS INDEX: 27.09 KG/M2

## 2022-01-18 VITALS
DIASTOLIC BLOOD PRESSURE: 68 MMHG | HEART RATE: 86 BPM | WEIGHT: 147 LBS | OXYGEN SATURATION: 100 % | SYSTOLIC BLOOD PRESSURE: 108 MMHG | BODY MASS INDEX: 26.89 KG/M2 | TEMPERATURE: 98.3 F | RESPIRATION RATE: 16 BRPM

## 2022-01-18 VITALS
BODY MASS INDEX: 28.29 KG/M2 | HEIGHT: 62 IN | WEIGHT: 153.7 LBS | DIASTOLIC BLOOD PRESSURE: 60 MMHG | HEART RATE: 88 BPM | SYSTOLIC BLOOD PRESSURE: 110 MMHG

## 2022-01-18 ASSESSMENT — PATIENT HEALTH QUESTIONNAIRE - PHQ9
SUM OF ALL RESPONSES TO PHQ QUESTIONS 1-9: 19
SUM OF ALL RESPONSES TO PHQ QUESTIONS 1-9: 7
SUM OF ALL RESPONSES TO PHQ QUESTIONS 1-9: 15
SUM OF ALL RESPONSES TO PHQ QUESTIONS 1-9: 16
SUM OF ALL RESPONSES TO PHQ QUESTIONS 1-9: 18

## 2022-01-21 ENCOUNTER — HOSPITAL ENCOUNTER (OUTPATIENT)
Dept: CT IMAGING | Facility: CLINIC | Age: 32
Discharge: HOME | End: 2022-01-21
Attending: OTOLARYNGOLOGY | Admitting: OTOLARYNGOLOGY
Payer: COMMERCIAL

## 2022-01-21 DIAGNOSIS — R09.81 CHRONIC NASAL CONGESTION: ICD-10-CM

## 2022-01-21 PROCEDURE — 70486 CT MAXILLOFACIAL W/O DYE: CPT

## 2022-01-24 ENCOUNTER — TELEPHONE (OUTPATIENT)
Dept: OTOLARYNGOLOGY | Facility: CLINIC | Age: 32
End: 2022-01-24
Payer: COMMERCIAL

## 2022-01-24 DIAGNOSIS — R09.81 CHRONIC NASAL CONGESTION: Primary | ICD-10-CM

## 2022-01-24 NOTE — TELEPHONE ENCOUNTER
Spoke with Myrna today regarding her CT scan. There has been some progression of her Odontogenic Sinusitis (involving the maxillary bases) over the past 6 years since her last CT. The OMCs are not obstructed and the left anterior ethmoid cell obstruction is not causing back up into the left frontal sinus. These 1-sided findings cannot be the cause of 2 sided symptoms. Will refer to Allergy.

## 2022-03-22 ENCOUNTER — OFFICE VISIT (OUTPATIENT)
Dept: ALLERGY | Facility: CLINIC | Age: 32
End: 2022-03-22
Attending: OTOLARYNGOLOGY
Payer: COMMERCIAL

## 2022-03-22 VITALS
OXYGEN SATURATION: 98 % | BODY MASS INDEX: 28.66 KG/M2 | HEART RATE: 97 BPM | RESPIRATION RATE: 20 BRPM | WEIGHT: 163.1 LBS

## 2022-03-22 DIAGNOSIS — R09.81 CHRONIC NASAL CONGESTION: ICD-10-CM

## 2022-03-22 DIAGNOSIS — Z01.82 ENCOUNTER FOR ALLERGY TESTING: Primary | ICD-10-CM

## 2022-03-22 PROCEDURE — 99243 OFF/OP CNSLTJ NEW/EST LOW 30: CPT | Mod: 25 | Performed by: ALLERGY & IMMUNOLOGY

## 2022-03-22 PROCEDURE — 95004 PERQ TESTS W/ALRGNC XTRCS: CPT | Performed by: ALLERGY & IMMUNOLOGY

## 2022-03-22 NOTE — PROGRESS NOTES
Subjective       HPI     Chief complaint: Allergies    History of present illness: This is a pleasant 31-year-old woman that I was asked to see for evaluation by Dr. Ludwig in regards to allergies.  Patient states that since 2010 or 11, she has been dealing with nasal congestion on both sides of her nose.  She states symptoms are not seasonal.  She states that she lives in another area she went to see an ENT and had allergy testing and was told that she needed positive to ragweed allergy.  She does not use any allergy medication.  She was prescribed Flonase which did help but did not completely improve the symptoms.  She has been following with ENT.  She states that she does not use any nasal rinses.  She does not use any allergy medication.  No history of asthma.  No history of eczema.    Past medical history: Otherwise unremarkable    Social history: She lives in an apartment with central air, she has 2 cats, secondhand smoke exposure, previously smoked marijuana up until the last month or so.  She states that she quit about a month ago.    Family history: Negative for asthma and allergies    Review of Systems   Constitutional, HEENT, cardiovascular, pulmonary, gi and gu systems are negative, except as otherwise noted.      Objective    Pulse 97   Resp 20   Wt 74 kg (163 lb 1.6 oz)   SpO2 98%   BMI 28.66 kg/m    Body mass index is 28.66 kg/m .  Physical Exam      Gen: Pleasant female not in acute distress  HEENT: Eyes no erythema of the bulbar or palpebral conjunctiva, no edema. Ears: TMs well visualized, no effusions. Nose: No congestion, mucosa normal. Mouth: Throat clear, no lip or tongue edema.   Cardiac: Regular rate and rhythm, no murmurs, rubs or gallops  Respiratory: Clear to auscultation bilaterally, no adventitious breath sounds  Lymph: No visible supraclavicular or cervical lymphadenopathy  Skin: No rashes or lesions  Psych: Alert and oriented times 3    30 percutaneous test were undertaken to  the environmental skin test panel.  Positive histamine control with a negative allergy skin test.  Please see scanned photograph.    Impression report and plan:  1. Nasal congestion    Allergy testing was negative. Suggested nasal rinses plus Astelin nasal spray.  Follow as needed.

## 2022-03-22 NOTE — LETTER
3/22/2022         RE: Myrna Martinez  [address]  [place] [address]        Dear Colleague,    Thank you for referring your patient, Myrna Martinez, to the [facility]. Please see a copy of my visit note below.          Subjective       HPI     Chief complaint: Allergies    History of present illness: This is a pleasant 31-year-old woman that I was asked to see for evaluation by Dr. Ludwig in regards to allergies.  Patient states that since 2010 or 11, she has been dealing with nasal congestion on both sides of her nose.  She states symptoms are not seasonal.  She states that she lives in another area she went to see an ENT and had allergy testing and was told that she needed positive to ragweed allergy.  She does not use any allergy medication.  She was prescribed Flonase which did help but did not completely improve the symptoms.  She has been following with ENT.  She states that she does not use any nasal rinses.  She does not use any allergy medication.  No history of asthma.  No history of eczema.    Past medical history: Otherwise unremarkable    Social history: She lives in an apartment with central air, she has 2 cats, secondhand smoke exposure, previously smoked marijuana up until the last month or so.  She states that she quit about a month ago.    Family history: Negative for asthma and allergies    Review of Systems   Constitutional, HEENT, cardiovascular, pulmonary, gi and gu systems are negative, except as otherwise noted.      Objective    Pulse 97   Resp 20   Wt 74 kg (163 lb 1.6 oz)   SpO2 98%   BMI 28.66 kg/m    Body mass index is 28.66 kg/m .  Physical Exam      Gen: Pleasant female not in acute distress  HEENT: Eyes no erythema of the bulbar or palpebral conjunctiva, no edema. Ears: TMs well visualized, no effusions. Nose: No congestion, mucosa normal. Mouth: Throat clear, no lip or tongue edema.   Cardiac: Regular rate and rhythm, no  murmurs, rubs or gallops  Respiratory: Clear to auscultation bilaterally, no adventitious breath sounds  Lymph: No visible supraclavicular or cervical lymphadenopathy  Skin: No rashes or lesions  Psych: Alert and oriented times 3    30 percutaneous test were undertaken to the environmental skin test panel.  Positive histamine control with a negative allergy skin test.  Please see scanned photograph.    Impression report and plan:  1. Nasal congestion    Allergy testing was negative. Suggested nasal rinses plus Astelin nasal spray.  Follow as needed.          Again, thank you for allowing me to participate in the care of your patient.        Sincerely,        Maye BURCIAGA MD

## 2022-04-04 ENCOUNTER — NURSE TRIAGE (OUTPATIENT)
Dept: NURSING | Facility: CLINIC | Age: 32
End: 2022-04-04
Payer: COMMERCIAL

## 2022-04-04 DIAGNOSIS — A60.09 HERPES GENITALIS IN WOMEN: ICD-10-CM

## 2022-04-04 RX ORDER — VALACYCLOVIR HYDROCHLORIDE 500 MG/1
500 TABLET, FILM COATED ORAL DAILY
Qty: 90 TABLET | Refills: 1 | Status: SHIPPED | OUTPATIENT
Start: 2022-04-04 | End: 2023-03-01

## 2022-04-04 NOTE — TELEPHONE ENCOUNTER
"Pt reports \"HSG flare.\"  \"Onset over the weekend.\"  \"Starting school today.\"    \"Used to have a flare every few months.\"  However had a long quiet time.  Pt suspects \"Stress with starting school today.\"    Pt declines full triage.  States \"It's the same as past flares from stress.\"  \"Painful.\"    Pharmacy verified.  See separate med refill encounter created per pt's request and routed high priority.    Jelena GUDINO Health Nurse Advisor     Reason for Disposition    Request for URGENT new prescription or refill of 'essential' medication (i.e., likelihood of harm to patient if not taken) and triager unable to fill per department policy    Protocols used: MEDICATION QUESTION CALL-A-[place]      _____________________      COVID 19 Nurse Triage Plan/Patient Instructions    Please be aware that novel coronavirus (COVID-19) may be circulating in the community. If you develop symptoms such as fever, cough, or SOB or if you have concerns about the presence of another infection including coronavirus (COVID-19), please contact your health care provider or visit [email or web address].     Disposition/Instructions    Additional COVID19 information to add for patients.   How can I protect others?  If you have symptoms (fever, cough, body aches or trouble breathing): Stay home and away from others (self-isolate) until:    At least 10 days have passed since your symptoms started, And     You ve had no fever--and no medicine that reduces fever--for 1 full day (24 hours), And      Your other symptoms have resolved (gotten better).     If you don t have symptoms, but a test showed that you have COVID-19 (you tested positive):    Stay home and away from others (self-isolate). Follow the tips under \"How do I self-isolate?\" below for 10 days (20 days if you have a weak immune system).    You don't need to be retested for COVID-19 before going back to school or work. As long as you're fever-free and feeling better, you can go back to " school, work and other activities after waiting the 10 or 20 days.     How do I self-isolate?    Stay in your own room, even for meals. Use your own bathroom if you can.     Stay away from others in your home. No hugging, kissing or shaking hands. No visitors.    Don t go to work, school or anywhere else.     Clean  high touch  surfaces often (doorknobs, counters, handles, etc.). Use a household cleaning spray or wipes. You ll find a full list on the EPA website:  www.epa.gov/pesticide-registration/list-n-disinfectants-use-against-sars-cov-2.    Cover your mouth and nose with a mask, tissue or washcloth to avoid spreading germs.    Wash your hands and face often. Use soap and water.    Caregivers in these groups are at risk for severe illness due to COVID-19:  o People 65 years and older  o People who live in a nursing home or long-term care facility  o People with chronic disease (lung, heart, cancer, diabetes, kidney, liver, immunologic)  o People who have a weakened immune system, including those who:  - Are in cancer treatment  - Take medicine that weakens the immune system, such as corticosteroids  - Had a bone marrow or organ transplant  - Have an immune deficiency  - Have poorly controlled HIV or AIDS  - Are obese (body mass index of 40 or higher)  - Smoke regularly    Caregivers should wear gloves while washing dishes, handling laundry and cleaning bedrooms and bathrooms.    Use caution when washing and drying laundry: Don t shake dirty laundry, and use the warmest water setting that you can.    For more tips, go to www.cdc.gov/coronavirus/2019-ncov/downloads/10Things.pdf.    How can I take care of myself?  1. Get lots of rest. Drink extra fluids (unless a doctor has told you not to).     2. Take Tylenol (acetaminophen) for fever or pain. If you have liver or kidney problems, ask your family doctor if it s okay to take Tylenol.     Adults can take either:     650 mg (two 325 mg pills) every 4 to 6 hours,  or     1,000 mg (two 500 mg pills) every 8 hours as needed.     Note: Don t take more than 3,000 mg in one day.   Acetaminophen is found in many medicines (both prescribed and over-the-counter medicines). Read all labels to be sure you don t take too much.     For children, check the Tylenol bottle for the right dose. The dose is based on the child s age or weight.    3. If you have other health problems (like cancer, heart failure, an organ transplant or severe kidney disease): Call your specialty clinic if you don t feel better in the next 2 days.    4. Know when to call 911: Emergency warning signs include:    Trouble breathing or shortness of breath    Pain or pressure in the chest that doesn t go away    Feeling confused like you haven t felt before, or not being able to wake up    Bluish-colored lips or face    What are the symptoms of COVID-19?     The most common symptoms are cough, fever and trouble breathing.     Less common symptoms include body aches, chills, diarrhea (loose, watery poops), fatigue (feeling very tired), headache, runny nose, sore throat and loss of smell.    COVID-19 can cause severe coughing (bronchitis) and lung infection (pneumonia).    How does it spread?     The virus may spread when a person coughs or sneezes into the air. The virus can travel about 6 feet this way, and it can live on surfaces.      Common  (household disinfectants) will kill the virus.    Who is at risk?  Anyone can catch COVID-19 if they re around someone who has the virus.    How can others protect themselves?     Stay away from people who have COVID-19 (or symptoms of COVID-19).    Wash hands often with soap and water. Or, use hand  with at least 60% alcohol.    Avoid touching the eyes, nose or mouth.     Wear a face mask when you go out in public, when sick or when caring for a sick person.    Where can I get more information?     [facility]: About COVID-19:  [email or web address]    CDC: What to Do If You re Sick: www.cdc.gov/coronavirus/2019-ncov/about/steps-when-sick.html    CDC: Ending Home Isolation: www.cdc.gov/coronavirus/2019-ncov/hcp/disposition-in-home-patients.html     CDC: Caring for Someone: www.cdc.gov/coronavirus/2019-ncov/if-you-are-sick/care-for-someone.html     [facility]: Interim Guidance for Hospital Discharge to Home: www.[facility]/diseases/coronavirus/hcp/hospdischarge.pdf    [facility] clinical trials (COVID-19 research studies): [email or web address]/[employer]-clinical-trials     Below are the COVID-19 hotlines at the [place] [facility] ([facility]). Interpreters are available.   o For health questions: Call 695-375-5063 or 1-577.313.1016 (7 a.m. to 7 p.m.)  o For questions about schools and childcare: Call 809-264-2112 or 1-251.994.5844 (7 a.m. to 7 p.m.)          Thank you for taking steps to prevent the spread of this virus.  o Limit your contact with others.  o Wear a simple mask to cover your cough.  o Wash your hands well and often.    Resources    [facility]: About COVID-19: [email or web address]    CDC: What to Do If You're Sick: www.cdc.gov/coronavirus/2019-ncov/about/steps-when-sick.html    CDC: Ending Home Isolation: www.cdc.gov/coronavirus/2019-ncov/hcp/disposition-in-home-patients.html     CDC: Caring for Someone: www.cdc.gov/coronavirus/2019-ncov/if-you-are-sick/care-for-someone.html     [facility]: Interim Guidance for Hospital Discharge to Home: www.[facility]/diseases/coronavirus/hcp/hospdischarge.pdf    [facility] clinical trials (COVID-19 research studies): [email or web address]/umn-[email or web address]     Below are the COVID-19 hotlines at the [place] [facility] ([facility]). Interpreters are available.   o For health questions: Call 362-816-6986 or 1-590.604.2283 (7 a.m. to 7 p.m.)  o For questions about schools and childcare: Call 726-348-4253 or 1-833.445.1487 (3  a.m. to 7 p.m.)

## 2022-04-04 NOTE — TELEPHONE ENCOUNTER
"Routing refill request to provider for review/approval because:  Labs not current:  Creatinine    Last Written Prescription Date:  1/2/2019  Last Fill Quantity: 90,  # refills: 1   Last office visit provider:  11/30/2021     Requested Prescriptions   Pending Prescriptions Disp Refills     valACYclovir (VALTREX) 500 MG tablet 90 tablet 1     Sig: Take 1 tablet (500 mg) by mouth daily       Antivirals for Herpes Protocol Failed - 4/4/2022  7:50 AM        Failed - Normal serum creatinine on file in past 12 months     Recent Labs   Lab Test 03/18/21  1456   CR 0.76       Ok to refill medication if creatinine is low          Passed - Patient is age 12 or older        Passed - Recent (12 mo) or future (30 days) visit within the authorizing provider's specialty     Patient has had an office visit with the authorizing provider or a provider within the authorizing providers department within the previous 12 mos or has a future within next 30 days. See \"Patient Info\" tab in inbasket, or \"Choose Columns\" in Meds & Orders section of the refill encounter.              Passed - Medication is active on med list             ____________________________      Pt calls to report \"HSG flare.\"  \"Onset over the weekend (48 hours).\"  \"Starting school today.\"    \"Used to have a flare every few months.\"  However had a long quiet time.  Pt suspects \"Stress with starting school today.\"    Pt declines full triage.  States \"It's the same as past flares from stress.\"  \"Painful.\"    Pharm[facility] verified -> [facility].    Pt's phone (if needed) -> [phone].  Detailed voicemail okay.    Thank you-    Jelena GUDINO Health Nurse Advisor   "

## 2022-05-15 ENCOUNTER — HEALTH MAINTENANCE LETTER (OUTPATIENT)
Age: 32
End: 2022-05-15

## 2022-08-25 ENCOUNTER — OFFICE VISIT (OUTPATIENT)
Dept: FAMILY MEDICINE | Facility: OTHER | Age: 32
End: 2022-08-25
Attending: NURSE PRACTITIONER
Payer: COMMERCIAL

## 2022-08-25 VITALS
SYSTOLIC BLOOD PRESSURE: 124 MMHG | TEMPERATURE: 98.8 F | BODY MASS INDEX: 31.63 KG/M2 | DIASTOLIC BLOOD PRESSURE: 78 MMHG | OXYGEN SATURATION: 99 % | RESPIRATION RATE: 16 BRPM | WEIGHT: 180 LBS | HEART RATE: 88 BPM

## 2022-08-25 DIAGNOSIS — R52 BODY ACHES: ICD-10-CM

## 2022-08-25 DIAGNOSIS — J02.9 ACUTE PHARYNGITIS, UNSPECIFIED ETIOLOGY: ICD-10-CM

## 2022-08-25 DIAGNOSIS — R53.83 OTHER FATIGUE: Primary | ICD-10-CM

## 2022-08-25 LAB
FLUAV RNA SPEC QL NAA+PROBE: NEGATIVE
FLUBV RNA RESP QL NAA+PROBE: NEGATIVE
GROUP A STREP BY PCR: NOT DETECTED
RSV RNA SPEC NAA+PROBE: NEGATIVE
SARS-COV-2 RNA RESP QL NAA+PROBE: NEGATIVE

## 2022-08-25 PROCEDURE — 99203 OFFICE O/P NEW LOW 30 MIN: CPT | Performed by: NURSE PRACTITIONER

## 2022-08-25 PROCEDURE — 87637 SARSCOV2&INF A&B&RSV AMP PRB: CPT | Mod: ZL | Performed by: NURSE PRACTITIONER

## 2022-08-25 PROCEDURE — G0463 HOSPITAL OUTPT CLINIC VISIT: HCPCS

## 2022-08-25 PROCEDURE — C9803 HOPD COVID-19 SPEC COLLECT: HCPCS | Performed by: NURSE PRACTITIONER

## 2022-08-25 PROCEDURE — 87651 STREP A DNA AMP PROBE: CPT | Mod: ZL | Performed by: NURSE PRACTITIONER

## 2022-08-25 ASSESSMENT — PAIN SCALES - GENERAL: PAINLEVEL: MILD PAIN (3)

## 2022-08-25 NOTE — NURSING NOTE
"Chief Complaint   Patient presents with     Pharyngitis     For 2 days   She started having body aches and fatigue 3 days ago. Her throat started to hurt 2 days ago.  Cyndee Davis LPN..................8/25/2022   1:07 PM      Initial /78   Pulse 88   Temp 98.8  F (37.1  C) (Temporal)   Resp 16   Wt 81.6 kg (180 lb)   SpO2 99%   Breastfeeding No   BMI 31.63 kg/m   Estimated body mass index is 31.63 kg/m  as calculated from the following:    Height as of 3/18/21: 1.607 m (5' 3.25\").    Weight as of this encounter: 81.6 kg (180 lb).  Medication Reconciliation: complete    FOOD SECURITY SCREENING QUESTIONS  Hunger Vital Signs:  Within the past 12 months we worried whether our food would run out before we got money to buy more. Never  Within the past 12 months the food we bought just didn't last and we didn't have money to get more. Never        Advance care directive on file? no  Advance care directive provided to patient? delined     Cyndee Davis, ERIC  "

## 2022-08-25 NOTE — PROGRESS NOTES
ASSESSMENT/PLAN:    I have reviewed the nursing notes.  I have reviewed the findings, diagnosis, plan and need for follow up with the patient.    1. Other fatigue  - Symptomatic; Yes; 8/22/2022 Influenza A/B & SARS-CoV2 (COVID-19) Virus PCR Multiplex Nose  Negative for all.     2. Acute pharyngitis, unspecified etiology  - Symptomatic; Yes; 8/22/2022 Influenza A/B & SARS-CoV2 (COVID-19) Virus PCR Multiplex Nose  - Group A Streptococcus PCR Throat Swab  Negative strep; no abx indicated.     3. Body aches  - Symptomatic; Yes; 8/22/2022 Influenza A/B & SARS-CoV2 (COVID-19) Virus PCR Multiplex Nose  Suspect viral illness at this time, no antibiotics are indicated for viruses.  It is reassuring that her COVID is negative.  Treat symptomatically follow-up if worsening condition.    Discussed warning signs/symptoms indicative of need to f/u    Follow up if symptoms persist or worsen or concerns    I explained my diagnostic considerations and recommendations to the patient, who voiced understanding and agreement with the treatment plan. All questions were answered. We discussed potential side effects of any prescribed or recommended therapies, as well as expectations for response to treatments.    Pam Arias NP  8/25/2022  1:11 PM    HPI:  Myrna Martinez is a 32 year old female who presents to [facility] today for concerns of body aches and fatigue, sore throat that started 3 days ago. She denies any fevers. No cough, shortness of breath. No known exposures to covid. Agreeable to testing. No definite fevers, possible low grades. Denies nausea, vomiting, diarrhea. No rashes, tick bites or myalgias, arthralgias.     Past Medical History:   Diagnosis Date     Anemia 1/12/2015     Anxiety      Breast disorder     pain under breasts     Chronic headache      Depression     [facility], was homeless at the time     Headaches, cluster      Herpes      Herpes genitalis in women     takes acyclovir     Mental  disorder     anxiety and depression     Papanicolaou smear of cervix with low grade squamous intraepithelial lesion (LGSIL)      Perineal abrasion      Seasonal allergies      Sprain of ankle      Past Surgical History:   Procedure Laterality Date     APPENDECTOMY       APPENDECTOMY  1996     HC REDUCTION OF LARGE BREAST      Description: Breast Surgery Reduction Procedure Bilateral;  Proc Date: 06/11/2012;     MAMMOPLASTY REDUCTION  2011     Social History     Tobacco Use     Smoking status: Never Smoker     Smokeless tobacco: Never Used   Substance Use Topics     Alcohol use: Yes     Alcohol/week: 0.0 standard drinks     Comment: Alcoholic Drinks/day: 1 drink/month     Current Outpatient Medications   Medication Sig Dispense Refill     cetirizine (ZYRTEC) 10 MG tablet Take 1 tablet (10 mg) by mouth daily 90 tablet 0     etonogestrel (IMPLANON/NEXPLANON) 68 MG IMPL 1 each (68 mg) by Subdermal route continuous  0     fluticasone (FLONASE) 50 MCG/ACT nasal spray Spray 2 sprays into both nostrils daily 9.9 mL 1     Ibuprofen (ADVIL PO) Take 400 mg by mouth every 6 hours as needed for moderate pain       valACYclovir (VALTREX) 500 MG tablet Take 1 tablet (500 mg) by mouth daily 90 tablet 1     No Known Allergies  Past medical history, past surgical history, current medications and allergies reviewed and accurate to the best of my knowledge.      ROS:  Refer to HPI    /78   Pulse 88   Temp 98.8  F (37.1  C) (Temporal)   Resp 16   Wt 81.6 kg (180 lb)   SpO2 99%   Breastfeeding No   BMI 31.63 kg/m      EXAM:  General Appearance: Well appearing 32 year old female, appropriate appearance for age. No acute distress   Ears: Left TM intact, translucent with bony landmarks appreciated, no erythema, no effusion, no bulging, no purulence.  Right TM intact, translucent with bony landmarks appreciated, no erythema, no effusion, no bulging, no purulence.  Left auditory canal clear.  Right auditory canal clear.  Normal  external ears, non tender.  Eyes: conjunctivae normal without erythema or irritation, corneas clear, no drainage or crusting, no eyelid swelling, pupils equal   Oropharynx: moist mucous membranes, posterior pharynx with erythema, tonsils symmetrical without erythema, no exudates or petechiae, no post nasal drip seen, no trismus, voice clear.    Sinuses:  No sinus tenderness upon palpation of the frontal or maxillary sinuses  Nose:  Bilateral nares: no erythema, no edema, no drainage or congestion   Neck: supple without adenopathy  Respiratory: normal chest wall and respirations.  Normal effort.  Clear to auscultation bilaterally, no wheezing, crackles or rhonchi.  No increased work of breathing.  No cough appreciated.  Cardiac: RRR with no murmurs  Psychological: normal affect, alert, oriented, and pleasant.     Results for orders placed or performed in visit on 08/25/22   Symptomatic; Yes; 8/22/2022 Influenza A/B & SARS-CoV2 (COVID-19) Virus PCR Multiplex Nose     Status: Normal    Specimen: Nose; Swab   Result Value Ref Range    Influenza A PCR Negative Negative    Influenza B PCR Negative Negative    RSV PCR Negative Negative    SARS CoV2 PCR Negative Negative    Narrative    Testing was performed using the Xpert Xpress CoV2/Flu/RSV Assay on the [employer] Gene[employer]pert Instrument. This test should be ordered for the detection of SARS-CoV-2 and influenza viruses in individuals who meet clinical and/or epidemiological criteria. Test performance is unknown in asymptomatic patients. This test is for in vitro diagnostic use under the FDA EUA for laboratories certified under CLIA to perform high or moderate complexity testing. This test has not been FDA cleared or approved. A negative result does not rule out the presence of PCR inhibitors in the specimen or target RNA in concentration below the limit of detection for the assay. If only one viral target is positive but coinfection with multiple targets is suspected, the  sample should be re-tested with another FDA cleared, approved, or authorized test, if coinfection would change clinical management. This test was validated by the [facility] [employer]. These laboratories are certified under the Clinical  Laboratory Improvement Amendments of 1988 (CLIA-88) as qualified to perform high complexity laboratory testing.   Group A Streptococcus PCR Throat Swab     Status: Normal    Specimen: Throat; Swab   Result Value Ref Range    Group A strep by PCR Not Detected Not Detected    Narrative    The Xpert Xpress Strep A test, performed on the [employer], is a rapid, qualitative in vitro diagnostic test for the detection of Streptococcus pyogenes (Group A ß-hemolytic Streptococcus, Strep A) in throat swab specimens from patients with signs and symptoms of pharyngitis. The Xpert Xpress Strep A test can be used as an aid in the diagnosis of Group A Streptococcal pharyngitis. The assay is not intended to monitor treatment for Group A Streptococcus infections. The Xpert Xpress Strep A test utilizes an automated real-time polymerase chain reaction (PCR) to detect Streptococcus pyogenes DNA.

## 2022-09-10 ENCOUNTER — HEALTH MAINTENANCE LETTER (OUTPATIENT)
Age: 32
End: 2022-09-10

## 2023-01-04 ENCOUNTER — NURSE TRIAGE (OUTPATIENT)
Dept: FAMILY MEDICINE | Facility: CLINIC | Age: 33
End: 2023-01-04

## 2023-01-04 NOTE — TELEPHONE ENCOUNTER
Patient had a Nexplanon insertion on 3.18.2021 and recently had intercourse around 12.15.22. Patient is experiencing lower abdomen cramping, and irritability more than normal. Patient does not get a menstrual cycle while on Nexplanon.    Patient is requesting a call back as it is abnormal for her to have these symptoms. Please call patient at [phone]. Okay to leave detailed message on VM, however she preferres speaking with someone.

## 2023-01-04 NOTE — TELEPHONE ENCOUNTER
Daniela العراقي, NP  [facility] Nurse Pool 2 minutes ago (4:33 PM)     AW  Please triage patient.  I don't think her symptoms have anything to do with her Nexplanon.     Daniela العراقي NP      Called Pt to triage.     Pt reports her symptoms began 2-3 days ago.   She is concerned about possible pregnancy - she had sex on 12/15/22 and just now started to feel crampy and irritated. She denied any pain with intercourse.  Denies symptoms of depression and anxiety.     She states pain is dull, crampy pain. Feels quite constant.  No fever.   No distention or tenderness to the touch.   Some diarrhea.   No trouble urinating.   No vaginal bleeding or discharge.     Pt reports she will take a pregnancy test at home.     Her pain does not radiate anywhere else.     Per protocol: Pt should be seen in [facility] for evaluation. She does not feel her symptoms are severe enough to be seen.   Pt plans to monitor symptoms and take a pregnancy test at home.     Will route to PCP for further advisement.     Randi Russo RN, BSN  [facility]    Reason for Disposition    MILD to MODERATE pain that comes and goes (cramps) and present > 48 hours    Pregnancy suspected (e.g., missed last menstrual period)    Additional Information    Negative: Shock suspected (e.g., cold/pale/clammy skin, too weak to stand, low BP, rapid pulse)    Negative: Passed out (i.e., lost consciousness, collapsed and was not responding)    Negative: Sounds like a life-threatening emergency to the triager    Negative: Menstrual cramps are main concern    Negative: Abdominal (stomach) pain is main concern    Negative: Vulvar (external genital area) pain or symptoms (e.g., dryness, sores, redness, blisters, bumps) is main concern    Negative: Rectal pain is main concern    Negative: Hip pain is main concern    Negative: Urination pain is main concern (pain with passing urine)    Negative: Pelvic pain and pregnant < 20 weeks     "Negative: Pelvic pain and pregnant 20 or more weeks    Negative: Followed an abdomen (stomach) injury    Negative: Followed a genital area injury (e.g., vagina, vulva)    Negative: SEVERE pelvic pain (e.g., excruciating) and vomiting    Negative: SEVERE pelvic pain and present > 1 hour    Negative: SEVERE vaginal bleeding (e.g., soaking 2 pads or tampons per hour and present 2 or more hours; 1 menstrual cup every 2 hours)    Negative: MODERATE vaginal bleeding (i.e., soaking pad or tampon per hour and present > 6 hours; 1 menstrual cup every 6 hours)    Negative: Patient sounds very sick or weak to the triager    Negative: Constant pelvic pain lasting > 2 hours    Negative: Fever > 103 F (39.4 C)    Negative: Fever > 101 F (38.3 C) and age > 60 years    Negative: Fever > 100.0 F (37.8 C) and bedridden (e.g., nursing home patient, CVA, chronic illness, recovering from surgery)    Negative: Fever > 100.0 F (37.8 C) and diabetes mellitus or weak immune system (e.g., HIV positive, cancer chemo, splenectomy, organ transplant, chronic steroids)    Negative: SEVERE pelvic pain and present < 1 hour    Answer Assessment - Initial Assessment Questions  1. LOCATION: \"Where does it hurt?\"       Reports she has pain in her pelvis area, lower abdomen.   2. RADIATION: \"Does the pain shoot anywhere else?\" (e.g., lower back, groin, thighs)      No   3. ONSET: \"When did the pain begin?\" (e.g., minutes, hours or days ago)       Couple days ago.   4. SUDDEN: \"Gradual or sudden onset?\"      Gradual  5. PATTERN \"Does the pain come and go, or is it constant?\"     - If constant: \"Is it getting better, staying the same, or worsening?\"       (Note: Constant means the pain never goes away completely; most serious pain is constant and gets worse over time)      - If intermittent: \"How long does it last?\" \"Do you have pain now?\"      (Note: Intermittent means the pain goes away completely between bouts)      Pretty constant  6. SEVERITY: \"How " "bad is the pain?\"  (e.g., Scale 1-10; mild, moderate, or severe)    - MILD (1-3): doesn't interfere with normal activities, area soft and not tender to touch     - MODERATE (4-7): interferes with normal activities or awakens from sleep, abdomen tender to touch     - SEVERE (8-10): excruciating pain, doubled over, unable to do any normal activities       Tolerable.   7. RECURRENT SYMPTOM: \"Have you ever had this type of pelvic pain before?\" If Yes, ask: \"When was the last time?\" and \"What happened that time?\"       No   8. CAUSE: \"What do you think is causing the pelvic pain?\"      Unsure   9. RELIEVING/AGGRAVATING FACTORS: \"What makes it better or worse?\" (e.g., activity/rest, sexual intercourse, voiding, passing stool)      Nothing  10. OTHER SYMPTOMS: \"Has there been any other symptoms?\" (e.g., fever, constipation, diarrhea, urine problems, vaginal bleeding, vaginal discharge, or vomiting?\"        No fever. Some diarrhea. No urine problem. No vaginal symptoms.   11. PREGNANCY: \"Is there any chance you are pregnant?\" \"When was your last menstrual period?\"        Unsure - Pt doesn't get her period with nexplanon. She had sex on 12/15/22    Protocols used: PELVIC PAIN - FEMALE-A-[place]      "

## 2023-01-05 NOTE — TELEPHONE ENCOUNTER
I agree with taking a home pregnancy test and an office visit if that is positive or her symptoms persist.    Daniela العراقي NP

## 2023-01-05 NOTE — TELEPHONE ENCOUNTER
Writer followed up with pt. Pt took home pregnancy test, negative results. Pt has not had cramps today. Pt feels comfortable managing symptoms at home. Encouraged pt to call back with any concerns or if needing care advice. Pt denied further questions or concerns.     Chela Petty RN

## 2023-01-13 ENCOUNTER — OFFICE VISIT (OUTPATIENT)
Dept: FAMILY MEDICINE | Facility: OTHER | Age: 33
End: 2023-01-13
Attending: NURSE PRACTITIONER
Payer: COMMERCIAL

## 2023-01-13 VITALS
HEART RATE: 90 BPM | BODY MASS INDEX: 31.49 KG/M2 | RESPIRATION RATE: 20 BRPM | SYSTOLIC BLOOD PRESSURE: 100 MMHG | DIASTOLIC BLOOD PRESSURE: 60 MMHG | OXYGEN SATURATION: 100 % | TEMPERATURE: 98.5 F | WEIGHT: 179.2 LBS

## 2023-01-13 DIAGNOSIS — N76.0 BACTERIAL VAGINOSIS: Primary | ICD-10-CM

## 2023-01-13 DIAGNOSIS — N76.0 VAGINITIS AND VULVOVAGINITIS: ICD-10-CM

## 2023-01-13 DIAGNOSIS — B96.89 BACTERIAL VAGINOSIS: Primary | ICD-10-CM

## 2023-01-13 DIAGNOSIS — Z01.89 PATIENT REQUEST FOR DIAGNOSTIC TESTING: ICD-10-CM

## 2023-01-13 LAB
C TRACH DNA SPEC QL PROBE+SIG AMP: NEGATIVE
CLUE CELLS: PRESENT
HCG UR QL: NEGATIVE
N GONORRHOEA DNA SPEC QL NAA+PROBE: NEGATIVE
TRICHOMONAS, WET PREP: ABNORMAL
WBC'S/HIGH POWER FIELD, WET PREP: ABNORMAL
YEAST, WET PREP: ABNORMAL

## 2023-01-13 PROCEDURE — 87491 CHLMYD TRACH DNA AMP PROBE: CPT | Mod: ZL | Performed by: NURSE PRACTITIONER

## 2023-01-13 PROCEDURE — 87210 SMEAR WET MOUNT SALINE/INK: CPT | Mod: ZL | Performed by: NURSE PRACTITIONER

## 2023-01-13 PROCEDURE — 99214 OFFICE O/P EST MOD 30 MIN: CPT | Performed by: NURSE PRACTITIONER

## 2023-01-13 PROCEDURE — 81025 URINE PREGNANCY TEST: CPT | Mod: ZL | Performed by: NURSE PRACTITIONER

## 2023-01-13 PROCEDURE — G0463 HOSPITAL OUTPT CLINIC VISIT: HCPCS

## 2023-01-13 RX ORDER — METRONIDAZOLE 500 MG/1
500 TABLET ORAL 2 TIMES DAILY
Qty: 14 TABLET | Refills: 0 | Status: SHIPPED | OUTPATIENT
Start: 2023-01-13 | End: 2023-01-20

## 2023-01-13 ASSESSMENT — PAIN SCALES - GENERAL: PAINLEVEL: NO PAIN (0)

## 2023-01-13 NOTE — NURSING NOTE
Tobias here for STD and pregnancy test. Her [place] ha been in place since March of 2021. She is noticing itching and odor and has had a new partner or two.  Medication Reconciliation: complete.    Haylee Petty LPN  1/13/2023 1:31 PM

## 2023-02-28 ENCOUNTER — OFFICE VISIT (OUTPATIENT)
Dept: FAMILY MEDICINE | Facility: OTHER | Age: 33
End: 2023-02-28
Attending: STUDENT IN AN ORGANIZED HEALTH CARE EDUCATION/TRAINING PROGRAM
Payer: COMMERCIAL

## 2023-02-28 VITALS
WEIGHT: 181.7 LBS | TEMPERATURE: 98.6 F | SYSTOLIC BLOOD PRESSURE: 104 MMHG | DIASTOLIC BLOOD PRESSURE: 68 MMHG | BODY MASS INDEX: 31.93 KG/M2 | RESPIRATION RATE: 20 BRPM | HEART RATE: 90 BPM | OXYGEN SATURATION: 98 %

## 2023-02-28 DIAGNOSIS — J06.9 VIRAL URI: Primary | ICD-10-CM

## 2023-02-28 DIAGNOSIS — R09.81 NASAL CONGESTION: ICD-10-CM

## 2023-02-28 DIAGNOSIS — J02.9 SORE THROAT: ICD-10-CM

## 2023-02-28 LAB
GROUP A STREP BY PCR: NOT DETECTED
SARS-COV-2 RNA RESP QL NAA+PROBE: NEGATIVE

## 2023-02-28 PROCEDURE — 99213 OFFICE O/P EST LOW 20 MIN: CPT | Mod: CS

## 2023-02-28 PROCEDURE — C9803 HOPD COVID-19 SPEC COLLECT: HCPCS

## 2023-02-28 PROCEDURE — 87651 STREP A DNA AMP PROBE: CPT | Mod: ZL

## 2023-02-28 PROCEDURE — G0463 HOSPITAL OUTPT CLINIC VISIT: HCPCS

## 2023-02-28 PROCEDURE — U0005 INFEC AGEN DETEC AMPLI PROBE: HCPCS | Mod: ZL

## 2023-02-28 ASSESSMENT — PAIN SCALES - GENERAL: PAINLEVEL: MODERATE PAIN (4)

## 2023-02-28 NOTE — PROGRESS NOTES
ASSESSMENT/PLAN:    I have reviewed the nursing notes.  I have reviewed the findings, diagnosis, plan and need for follow up with the patient.    1. Viral URI  2. Sore throat  3. Nasal congestion  - Group A Streptococcus PCR Throat Swab  - Symptomatic COVID-19 Virus (Coronavirus) by PCR Nose    Patient presents with upper respiratory symptoms.  Her vital signs are stable and patient appears nontoxic.  Her strep and COVID test were both negative.  Patient was notified of results. Discussed with patient that symptoms and exam are consistent with viral illness.  Discussed that symptomatic treatment is appropriate but not with antibiotics. Discussed symptomatic treatment - Encouraged fluids, salt water gargles, honey (only if greater than 1 year in age due to risk of botulism), elevation, humidifier, sinus rinse/netti pot, lozenges, tea, topical vapor rub, popsicles, rest, etc. May use over-the-counter Tylenol or ibuprofen PRN.  Advised patient that she can try an over-the-counter antihistamine such as Claritin or Zyrtec and fluticasone nasal spray to help with nasal congestion.    Discussed warning signs/symptoms indicative of need to f/u    Follow up if symptoms persist or worsen or concerns    I explained my diagnostic considerations and recommendations to the patient, who voiced understanding and agreement with the treatment plan. All questions were answered. We discussed potential side effects of any prescribed or recommended therapies, as well as expectations for response to treatments.    Osman Templeton, EVELIO CNP  2/28/2023  2:35 PM    HPI:    Myrna Martinez is a 32 year old female  who [facility] for concerns of URI symptoms    URI, x 1-2 days    Symptoms:  No fevers or chills.   YES: + sore throat/pharyngitis/tonsillitis.   YES: + allergy/URI Symptoms  No muffled sounds/change in hearing  No sensation of fullness in ear(s)  No ringing in ears/tinnitus  No balance changes  No  dizziness  YES: + congestion (head/nasal/chest)  YES: + cough/productive cough - throat irritation  YES: + post nasal drip   YES: + headache  YES: + sinus pain/pressure  YES: + myalgias  No otalgia  No rash  Activity Level Changes: Yes: fatigue  Appetite/Liquid Intake Changes: Yes: decreased  Changes to Bowel Habits: Yes: diarrhea  Changes to Bladder Habits: No  Additional Symptoms to Report: No  History of similar symptoms: Yes: strep throat  Prior workup: No    Treatments tried: Tylenol/Ibuprofen, Fluids and Rest    Site of exposure: home to son(s)  Type of exposure: strep throat    Other Pertinent History: none    Allergies: NKA    PCP: Daniela العراقي NP    Past Medical History:   Diagnosis Date     Anemia 1/12/2015     Anxiety      Breast disorder     pain under breasts     Chronic headache      Depression     [facility], was homeless at the time     Headaches, cluster      Herpes      Herpes genitalis in women     takes acyclovir     Mental disorder     anxiety and depression     Papanicolaou smear of cervix with low grade squamous intraepithelial lesion (LGSIL)      Perineal abrasion      Seasonal allergies      Sprain of ankle      Past Surgical History:   Procedure Laterality Date     APPENDECTOMY       APPENDECTOMY  1996     HC REDUCTION OF LARGE BREAST      Description: Breast Surgery Reduction Procedure Bilateral;  Proc Date: 06/11/2012;     MAMMOPLASTY REDUCTION  2011     Social History     Tobacco Use     Smoking status: Never     Smokeless tobacco: Never   Substance Use Topics     Alcohol use: Yes     Alcohol/week: 0.0 standard drinks     Comment: Alcoholic Drinks/day: 1 drink/month     Current Outpatient Medications   Medication Sig Dispense Refill     etonogestrel (IMPLANON/NEXPLANON) 68 MG IMPL 1 each (68 mg) by Subdermal route continuous  0     Ibuprofen (ADVIL PO) Take 400 mg by mouth every 6 hours as needed for moderate pain (4-6)       cetirizine (ZYRTEC) 10 MG tablet Take 1 tablet (10  mg) by mouth daily (Patient not taking: Reported on 1/13/2023) 90 tablet 0     fluticasone (FLONASE) 50 MCG/ACT nasal spray Spray 2 sprays into both nostrils daily (Patient not taking: Reported on 1/13/2023) 9.9 mL 1     valACYclovir (VALTREX) 500 MG tablet Take 1 tablet (500 mg) by mouth daily (Patient not taking: Reported on 1/13/2023) 90 tablet 1     No Known Allergies  Past medical history, past surgical history, current medications and allergies reviewed and accurate to the best of my knowledge.      ROS:  Refer to HPI    /68   Pulse 90   Temp 98.6  F (37  C)   Resp 20   Wt 82.4 kg (181 lb 11.2 oz)   SpO2 98%   BMI 31.93 kg/m      EXAM:  General Appearance: Well appearing 32 year old female, appropriate appearance for age. No acute distress   Ears: Left TM intact, translucent with bony landmarks appreciated, no erythema, no effusion, no bulging, no purulence.  Right TM intact, translucent with bony landmarks appreciated, no erythema, no effusion, no bulging, no purulence.  Left auditory canal clear.  Right auditory canal clear.  Normal external ears, non tender.  Eyes: conjunctivae normal without erythema or irritation, corneas clear, no drainage or crusting, no eyelid swelling, pupils equal   Oropharynx: moist mucous membranes, posterior pharynx with erythema, tonsils symmetric and 1+, mild erythema, no exudates or petechiae, no post nasal drip seen, no trismus, voice clear.    Sinuses:  No sinus tenderness upon palpation of the frontal or maxillary sinuses  Nose:  Bilateral nares: no erythema, no edema, clear drainage and congestion   Neck: supple without adenopathy  Respiratory: normal chest wall and respirations.  Normal effort.  Clear to auscultation bilaterally, no wheezing, crackles or rhonchi.  No increased work of breathing.  No cough appreciated.  Cardiac: RRR with no murmurs  Musculoskeletal:  Equal movement of bilateral upper extremities.  Equal movement of bilateral lower extremities.   Normal gait.    Dermatological: no rashes noted of exposed skin  Neuro: Alert and oriented to person, place, and time.    Psychological: normal affect, alert, oriented, and pleasant.     Labs:  Results for orders placed or performed in visit on 02/28/23   Symptomatic COVID-19 Virus (Coronavirus) by PCR Nose     Status: Normal    Specimen: Nose; Swab   Result Value Ref Range    SARS CoV2 PCR Negative Negative    Narrative    Testing was performed using the Xpert Xpress SARS-CoV-2 Assay on the Cep[employer] Gene-Xpert Instrument Systems. Additional information about this Emergency Use Authorization (EUA) assay can be found via the Lab Guide. This test should be ordered for the detection of SARS-CoV-2 in individuals who meet SARS-CoV-2 clinical and/or epidemiological criteria as well as from individuals without symptoms or other reasons to suspect COVID-19. Test performance for asymptomatic patients has only been established in anterior nasal swab specimens. This test is for in vitro diagnostic use under the FDA EUA for laboratories certified under CLIA to perform high complexity testing. This test has not been FDA cleared or approved. A negative result does not rule out the presence of PCR inhibitors in the specimen or target RNA concentration below the limit of detection for the assay. The possibility of a false negative should be considered if the patient's recent exposure or clinical presentation suggests COVID-19. This test was validated by [facility]. This laboratory is certified under the Clinical Laboratory Improvement Amendments (CLIA) as qualified to perform high complexity clinical laboratory testing.   Group A Streptococcus PCR Throat Swab     Status: Normal    Specimen: Throat; Swab   Result Value Ref Range    Group A strep by PCR Not Detected Not Detected    Narrative    The Xpert Xpress Strep A test, performed on the [employer], is a rapid,  qualitative in vitro diagnostic test for the detection of Streptococcus pyogenes (Group A ß-hemolytic Streptococcus, Strep A) in throat swab specimens from patients with signs and symptoms of pharyngitis. The Xpert Xpress Strep A test can be used as an aid in the diagnosis of Group A Streptococcal pharyngitis. The assay is not intended to monitor treatment for Group A Streptococcus infections. The Xpert Xpress Strep A test utilizes an automated real-time polymerase chain reaction (PCR) to detect Streptococcus pyogenes DNA.

## 2023-02-28 NOTE — NURSING NOTE
Patient here for sore throat, neck ache has been exposed to strep and possible covid. Medication Reconciliation: complete.    Haylee Petty LPN  2/28/2023 2:25 PM

## 2023-02-28 NOTE — PATIENT INSTRUCTIONS
"If a strep test was performed:   We will call you with the results of the strep test, in the meantime, information below on viral colds/upper respiratory tract infections were provided (on average the test takes about 30-60 minutes to return).    If the strep test returns \"POSITIVE\" for strep, you will be prescribed an antibiotic, if this is the case, please take the entire course of antibiotic, even if feeling better prior to this. Continue with symptomatic treatment below as needed. If positive, please change toothbrush on day 2.     If the strep test returns \"NEGATIVE\" you do not need antibiotics and are to continue with conservative treatment as outlined below. Continue to monitor symptoms.       If a COVID swab was performed:   Please quarantine until results return which takes 24-72 hours, unless informed otherwise.     If COVID testing is negative, symptoms are improving (no need for antipyretics/fever reducers, etc.), that patient can return to normal activities of daily living.    If you test positive for COVID (regardless of vaccination status): stay home for 5 days. If you have no symptoms or symptoms are resolving after 5 days, you may leave the house per CDC guidelines. Continue to wear a mask around others for 5 days. You should also be fever free for 24 hours without the use of fever reducers (Tylenol/Ibuprofen).      Please refer to your AVS for follow up and pain/symptoms management recommendations (I.e.: medications, helpful conservative treatment modalities, appropriate follow up if need to a specialist or family practice, etc.). Please return to urgent care if your symptoms change or worsen.     Discharge instructions:  -If you were prescribed a medication(s), please take this as prescribed/directed  -Monitor your symptoms, if changing/worsening, return to UC/ER or PCP for follow up    Symptomatic treatments recommended.  - Antibiotics will not help with your symptoms, unless you were told " otherwise today (strep throat, ear infection, etc. ). Education provided on symptoms of secondary bacterial infection such as new fever, chills, rigors, shortness of breath, increased work of breathing, that can occur with viral URI and need for further evaluation, if they occur.   - Ensure you are staying hydrated by drinking plenty of fluids and eating mild foods and advance diet as tolerated  - Honey can be soothing for sore throat (as long as above 12 months of age)  - Warm salt water gurgles can help soothe sore throat  - Humidifier can help with congestion and help keep mucus membranes such as throat and nose from drying out.  - Sleeping slightly propped up can help with congestion and postnasal drainage that can worsen cough at bedtime.  - As long as you have never been told to take Tylenol and/or Ibuprofen you can use them to manage fever and body aches per package instructions  Make sure you eat when you take ibuprofen to avoid stomach upset.  - OTC cough medications per package instructions to help with cough. Check to see if the cough/cold medication already has acetaminophen (Tylenol) in it. If it does avoid taking additional Tylenol.  - If sudden onset of new fever, worsening symptoms return for further evaluation.  - OTC antihistamine such as Allegra, Zyrtec, Claritin (generic is okay) can help with nasal/sinus congestion and OTC nasal steroid such as Flonase can help decrease sinus inflammation to help with congestion.  - Education provided on symptoms of post-viral bacterial infections including ear infection and pneumonia. This would require re-evaluation for treatment.

## 2023-03-01 ENCOUNTER — OFFICE VISIT (OUTPATIENT)
Dept: FAMILY MEDICINE | Facility: OTHER | Age: 33
End: 2023-03-01
Attending: NURSE PRACTITIONER
Payer: COMMERCIAL

## 2023-03-01 ENCOUNTER — TELEPHONE (OUTPATIENT)
Dept: FAMILY MEDICINE | Facility: OTHER | Age: 33
End: 2023-03-01
Payer: COMMERCIAL

## 2023-03-01 VITALS
OXYGEN SATURATION: 99 % | WEIGHT: 180 LBS | DIASTOLIC BLOOD PRESSURE: 74 MMHG | BODY MASS INDEX: 31.89 KG/M2 | HEART RATE: 76 BPM | HEIGHT: 63 IN | SYSTOLIC BLOOD PRESSURE: 118 MMHG | TEMPERATURE: 99.2 F | RESPIRATION RATE: 20 BRPM

## 2023-03-01 DIAGNOSIS — J06.9 VIRAL URI WITH COUGH: ICD-10-CM

## 2023-03-01 DIAGNOSIS — J02.9 VIRAL PHARYNGITIS: Primary | ICD-10-CM

## 2023-03-01 PROCEDURE — 99213 OFFICE O/P EST LOW 20 MIN: CPT | Performed by: NURSE PRACTITIONER

## 2023-03-01 PROCEDURE — G0463 HOSPITAL OUTPT CLINIC VISIT: HCPCS

## 2023-03-01 PROCEDURE — 250N000009 HC RX 250: Performed by: NURSE PRACTITIONER

## 2023-03-01 RX ORDER — DEXAMETHASONE SODIUM PHOSPHATE 4 MG/ML
10 VIAL (ML) INJECTION ONCE
Status: COMPLETED | OUTPATIENT
Start: 2023-03-01 | End: 2023-03-01

## 2023-03-01 RX ADMIN — DEXAMETHASONE SODIUM PHOSPHATE 10 MG: 4 INJECTION, SOLUTION INTRA-ARTICULAR; INTRALESIONAL; INTRAMUSCULAR; INTRAVENOUS; SOFT TISSUE at 15:27

## 2023-03-01 ASSESSMENT — ANXIETY QUESTIONNAIRES
7. FEELING AFRAID AS IF SOMETHING AWFUL MIGHT HAPPEN: SEVERAL DAYS
1. FEELING NERVOUS, ANXIOUS, OR ON EDGE: SEVERAL DAYS
3. WORRYING TOO MUCH ABOUT DIFFERENT THINGS: MORE THAN HALF THE DAYS
5. BEING SO RESTLESS THAT IT IS HARD TO SIT STILL: NOT AT ALL
GAD7 TOTAL SCORE: 7
6. BECOMING EASILY ANNOYED OR IRRITABLE: MORE THAN HALF THE DAYS
IF YOU CHECKED OFF ANY PROBLEMS ON THIS QUESTIONNAIRE, HOW DIFFICULT HAVE THESE PROBLEMS MADE IT FOR YOU TO DO YOUR WORK, TAKE CARE OF THINGS AT HOME, OR GET ALONG WITH OTHER PEOPLE: SOMEWHAT DIFFICULT
GAD7 TOTAL SCORE: 7
4. TROUBLE RELAXING: NOT AT ALL
2. NOT BEING ABLE TO STOP OR CONTROL WORRYING: SEVERAL DAYS

## 2023-03-01 ASSESSMENT — PATIENT HEALTH QUESTIONNAIRE - PHQ9: SUM OF ALL RESPONSES TO PHQ QUESTIONS 1-9: 3

## 2023-03-01 ASSESSMENT — PAIN SCALES - GENERAL: PAINLEVEL: MODERATE PAIN (4)

## 2023-03-01 NOTE — NURSING NOTE
Patient presents today for sore throat. Patient had a negative strep test yesterday but it feeling worse today.    Medication Reconciliation Complete    Maritza Bhandari LPN  3/1/2023 2:51 PM

## 2023-03-01 NOTE — TELEPHONE ENCOUNTER
Pt was tested for strep throat yesterday in the .  She thinks that she may have tested too soon after her son was diagnosed.  Her throat is getting worse.  Please call.    Jose L Cabral on 3/1/2023 at 1:41 PM

## 2023-03-01 NOTE — TELEPHONE ENCOUNTER
Called and spoke to Patient after verifying last name and date of birth.  Was in  yesterday, tested negative for strep. Her son has strep, close contact.  She now feels worse and can see white spots in her throat.  Denies fever.  Advised to do home care treatments. Patient wonders if she should come back in.   Informed her if she is feeling worse to come back in.  Patient in agreement with this plan.  Katalina Wynne RN on 3/1/2023 at 2:28 PM

## 2023-03-01 NOTE — PROGRESS NOTES
"  Assessment & Plan   Problem List Items Addressed This Visit    None  Visit Diagnoses     Viral pharyngitis    -  Primary    Relevant Medications    dexamethasone (DECADRON) injectable solution used ORALLY 10 mg (Completed)    Viral URI with cough            Reviewed [facility] and [facility].  Reassurance was provided today.  We discussed typical course of illness.  Decadron was given to help with throat pain.  Recommend ongoing use of over-the-counter cough and cold medications, Flonase nasal spray.  Follow-up as needed.    Prescription drug management  23 minutes spent on the date of the encounter doing chart review, history and exam, documentation and further activities per the note      No follow-ups on file.    EVELIO [facility]    Lilian Norris is a 32 year old, presenting for the following health issues:  Throat Problem      HPI     She comes in today for follow-up of sore throat and sinus congestion.  [facility], strep and COVID test were negative.  She reports symptoms been present for the past 2 to 3 days, symptoms seem to be worse today.  She have a hard time swallowing and noted some white spots on her throat.  She has had some body aches and headaches.  Denies any fevers.  She is been taking Tylenol Cold and flu which has helped with the headaches but not the sore throat.  She also has restarted Flonase as she does have history of chronic and recurrent sinus issues.  Son with strep throat last week.    Review of Systems     See above    Objective    /74   Pulse 76   Temp 99.2  F (37.3  C)   Resp 20   Ht 1.607 m (5' 3.25\")   Wt 81.6 kg (180 lb)   SpO2 99%   BMI 31.63 kg/m    Body mass index is 31.63 kg/m .  Physical Exam   GENERAL: Ill but nontoxic-appearing female  EYES: Eyes grossly normal to inspection, PERRL and conjunctivae and sclerae normal  HENT: ear canals and TM's normal, nose and mouth " without ulcers or lesions.  Posterior pharynx with mild erythema.  NECK: no adenopathy, no asymmetry, masses, or scars and thyroid normal to palpation  RESP: lungs clear to auscultation - no rales, rhonchi or wheezes  CV: regular rate and rhythm, normal S1 S2  NEURO: Normal strength and tone, mentation intact and speech normal  PSYCH: mentation appears normal, affect normal/bright

## 2023-05-10 ENCOUNTER — OFFICE VISIT (OUTPATIENT)
Dept: FAMILY MEDICINE | Facility: OTHER | Age: 33
End: 2023-05-10
Attending: FAMILY MEDICINE
Payer: COMMERCIAL

## 2023-05-10 VITALS
WEIGHT: 178 LBS | RESPIRATION RATE: 18 BRPM | HEIGHT: 62 IN | HEART RATE: 101 BPM | TEMPERATURE: 98.7 F | BODY MASS INDEX: 32.76 KG/M2 | DIASTOLIC BLOOD PRESSURE: 80 MMHG | SYSTOLIC BLOOD PRESSURE: 126 MMHG | OXYGEN SATURATION: 98 %

## 2023-05-10 DIAGNOSIS — R40.0 DAYTIME SOMNOLENCE: ICD-10-CM

## 2023-05-10 DIAGNOSIS — J32.0 CHRONIC MAXILLARY SINUSITIS: ICD-10-CM

## 2023-05-10 DIAGNOSIS — A60.00 GENITAL HERPES SIMPLEX, UNSPECIFIED SITE: Primary | ICD-10-CM

## 2023-05-10 DIAGNOSIS — R53.83 OTHER FATIGUE: ICD-10-CM

## 2023-05-10 LAB
BASOPHILS # BLD AUTO: 0.1 10E3/UL (ref 0–0.2)
BASOPHILS NFR BLD AUTO: 1 %
EOSINOPHIL # BLD AUTO: 0.2 10E3/UL (ref 0–0.7)
EOSINOPHIL NFR BLD AUTO: 2 %
ERYTHROCYTE [DISTWIDTH] IN BLOOD BY AUTOMATED COUNT: 12.2 % (ref 10–15)
HCT VFR BLD AUTO: 36.3 % (ref 35–47)
HGB BLD-MCNC: 12.4 G/DL (ref 11.7–15.7)
IMM GRANULOCYTES # BLD: 0 10E3/UL
IMM GRANULOCYTES NFR BLD: 0 %
LYMPHOCYTES # BLD AUTO: 2.6 10E3/UL (ref 0.8–5.3)
LYMPHOCYTES NFR BLD AUTO: 31 %
MCH RBC QN AUTO: 31.1 PG (ref 26.5–33)
MCHC RBC AUTO-ENTMCNC: 34.2 G/DL (ref 31.5–36.5)
MCV RBC AUTO: 91 FL (ref 78–100)
MONOCYTES # BLD AUTO: 0.5 10E3/UL (ref 0–1.3)
MONOCYTES NFR BLD AUTO: 6 %
NEUTROPHILS # BLD AUTO: 5.1 10E3/UL (ref 1.6–8.3)
NEUTROPHILS NFR BLD AUTO: 60 %
NRBC # BLD AUTO: 0 10E3/UL
NRBC BLD AUTO-RTO: 0 /100
PLATELET # BLD AUTO: 260 10E3/UL (ref 150–450)
RBC # BLD AUTO: 3.99 10E6/UL (ref 3.8–5.2)
TSH SERPL DL<=0.005 MIU/L-ACNC: 1.33 UIU/ML (ref 0.3–4.2)
VIT B12 SERPL-MCNC: 708 PG/ML (ref 232–1245)
WBC # BLD AUTO: 8.5 10E3/UL (ref 4–11)

## 2023-05-10 PROCEDURE — 82306 VITAMIN D 25 HYDROXY: CPT | Mod: ZL | Performed by: FAMILY MEDICINE

## 2023-05-10 PROCEDURE — 84443 ASSAY THYROID STIM HORMONE: CPT | Mod: ZL | Performed by: FAMILY MEDICINE

## 2023-05-10 PROCEDURE — 36415 COLL VENOUS BLD VENIPUNCTURE: CPT | Mod: ZL | Performed by: FAMILY MEDICINE

## 2023-05-10 PROCEDURE — 85025 COMPLETE CBC W/AUTO DIFF WBC: CPT | Mod: ZL | Performed by: FAMILY MEDICINE

## 2023-05-10 PROCEDURE — G0463 HOSPITAL OUTPT CLINIC VISIT: HCPCS

## 2023-05-10 PROCEDURE — 99214 OFFICE O/P EST MOD 30 MIN: CPT | Performed by: FAMILY MEDICINE

## 2023-05-10 PROCEDURE — 82607 VITAMIN B-12: CPT | Mod: ZL | Performed by: FAMILY MEDICINE

## 2023-05-10 RX ORDER — VALACYCLOVIR HYDROCHLORIDE 500 MG/1
500 TABLET, FILM COATED ORAL 2 TIMES DAILY
Qty: 10 TABLET | Refills: 1 | Status: SHIPPED | OUTPATIENT
Start: 2023-05-10 | End: 2024-04-18

## 2023-05-10 RX ORDER — VALACYCLOVIR HYDROCHLORIDE 500 MG/1
500 TABLET, FILM COATED ORAL 2 TIMES DAILY
COMMUNITY
End: 2023-05-10

## 2023-05-10 ASSESSMENT — ANXIETY QUESTIONNAIRES
GAD7 TOTAL SCORE: 9
4. TROUBLE RELAXING: SEVERAL DAYS
3. WORRYING TOO MUCH ABOUT DIFFERENT THINGS: NEARLY EVERY DAY
GAD7 TOTAL SCORE: 9
IF YOU CHECKED OFF ANY PROBLEMS ON THIS QUESTIONNAIRE, HOW DIFFICULT HAVE THESE PROBLEMS MADE IT FOR YOU TO DO YOUR WORK, TAKE CARE OF THINGS AT HOME, OR GET ALONG WITH OTHER PEOPLE: VERY DIFFICULT
8. IF YOU CHECKED OFF ANY PROBLEMS, HOW DIFFICULT HAVE THESE MADE IT FOR YOU TO DO YOUR WORK, TAKE CARE OF THINGS AT HOME, OR GET ALONG WITH OTHER PEOPLE?: VERY DIFFICULT
7. FEELING AFRAID AS IF SOMETHING AWFUL MIGHT HAPPEN: NOT AT ALL
6. BECOMING EASILY ANNOYED OR IRRITABLE: SEVERAL DAYS
GAD7 TOTAL SCORE: 9
7. FEELING AFRAID AS IF SOMETHING AWFUL MIGHT HAPPEN: NOT AT ALL
1. FEELING NERVOUS, ANXIOUS, OR ON EDGE: MORE THAN HALF THE DAYS
5. BEING SO RESTLESS THAT IT IS HARD TO SIT STILL: SEVERAL DAYS
2. NOT BEING ABLE TO STOP OR CONTROL WORRYING: SEVERAL DAYS

## 2023-05-10 ASSESSMENT — PATIENT HEALTH QUESTIONNAIRE - PHQ9
SUM OF ALL RESPONSES TO PHQ QUESTIONS 1-9: 7
SUM OF ALL RESPONSES TO PHQ QUESTIONS 1-9: 7

## 2023-05-10 ASSESSMENT — PAIN SCALES - GENERAL: PAINLEVEL: NO PAIN (0)

## 2023-05-10 NOTE — PROGRESS NOTES
"  Assessment & Plan     Chronic maxillary sinusitis  Recommend trial of Augmentin given duration of symptoms.  Switch to budesonide nasal spray and as ordered.  Reviewed previous ENT consultations.  - amoxicillin-clavulanate (AUGMENTIN) 875-125 MG tablet; Take 1 tablet by mouth 2 times daily for 10 days  - budesonide (RINOCORT AQUA) 32 MCG/ACT nasal spray; Spray 1 spray into both nostrils At Bedtime    Genital herpes simplex, unspecified site  Intermittent use of Valtrex  - valACYclovir (VALTREX) 500 MG tablet; Take 1 tablet (500 mg) by mouth 2 times daily for 5 days    Other fatigue  Proceed with labs as ordered  - CBC and Differential; Future  - TSH Reflex GH; Future  - Vitamin D Total; Future  - Vitamin B12; Future  - CBC and Differential  - TSH Reflex GH  - Vitamin D Total  - Vitamin B12    Daytime somnolence  Recommend home oximetry  - Overnight oximetry study; Future    There are no Patient Instructions on file for this visit.           BMI:   Estimated body mass index is 32.82 kg/m  as calculated from the following:    Height as of this encounter: 1.568 m (5' 1.75\").    Weight as of this encounter: 80.7 kg (178 lb).   Weight management plan: Discussed healthy diet and exercise guidelines        No follow-ups on file.    Rayna Alas MD  [facility]   Myrna is a 32 year old, presenting for the following health issues:  Establish Care and Nasal Problem    33 yo female presents to establish care.    flonase does not seem to work, decrease smell, congestion    Previous ENT consult, no significant change with the recommendations.    Chronic fatigue        History of Present Illness       Reason for visit:  Nasal and check up    She eats 0-1 servings of fruits and vegetables daily.She consumes 1 sweetened beverage(s) daily.She exercises with enough effort to increase her heart rate 9 or less minutes per day.  She exercises with enough effort to increase her heart rate 3 " "or less days per week.   She is taking medications regularly.    Today's PHQ-9         PHQ-9 Total Score: 7    PHQ-9 Q9 Thoughts of better off dead/self-harm past 2 weeks :   Not at all      Today's NICHOLE-7 Score: 9               Review of Systems   Constitutional, HEENT, cardiovascular, pulmonary, gi and gu systems are negative, except as otherwise noted.      Objective    /80   Pulse 101   Temp 98.7  F (37.1  C) (Tympanic)   Resp 18   Ht 1.568 m (5' 1.75\")   Wt 80.7 kg (178 lb)   SpO2 98%   Breastfeeding No   BMI 32.82 kg/m    Body mass index is 32.82 kg/m .  Physical Exam  HENT:      Right Ear: Tympanic membrane normal.      Left Ear: Tympanic membrane normal.      Nose: Congestion present.      Mouth/Throat:      Mouth: Mucous membranes are moist.      Pharynx: Oropharynx is clear.   Pulmonary:      Effort: Pulmonary effort is normal.   Musculoskeletal:      Cervical back: Normal range of motion. No rigidity.   Lymphadenopathy:      Cervical: No cervical adenopathy.   Neurological:      Mental Status: She is alert.   Psychiatric:         Mood and Affect: Mood normal.                            "

## 2023-05-10 NOTE — NURSING NOTE
Patient is here to establish care, has multiple concerns. Has nasal issues, on going for years. Skin complexion dark circles around eyes. She would like script for valtrex.     No LMP recorded. Patient has had an implant.  Medication Reconciliation: complete      Nolvia Garcia LPN 5/10/2023 2:00 PM       Advance care directive on file? no  Advance care directive provided to patient? no       Nolvia Garcia LPN

## 2023-05-11 LAB — DEPRECATED CALCIDIOL+CALCIFEROL SERPL-MC: 33 UG/L (ref 20–75)

## 2023-06-01 ENCOUNTER — HOSPITAL ENCOUNTER (OUTPATIENT)
Dept: RESPIRATORY THERAPY | Facility: OTHER | Age: 33
Discharge: HOME | End: 2023-06-01
Attending: FAMILY MEDICINE | Admitting: FAMILY MEDICINE
Payer: COMMERCIAL

## 2023-06-01 DIAGNOSIS — R40.0 DAYTIME SOMNOLENCE: ICD-10-CM

## 2023-06-01 PROCEDURE — 94762 N-INVAS EAR/PLS OXIMTRY CONT: CPT

## 2023-06-03 ENCOUNTER — HEALTH MAINTENANCE LETTER (OUTPATIENT)
Age: 33
End: 2023-06-03

## 2023-06-13 ENCOUNTER — TELEPHONE (OUTPATIENT)
Dept: FAMILY MEDICINE | Facility: OTHER | Age: 33
End: 2023-06-13
Payer: COMMERCIAL

## 2023-06-13 DIAGNOSIS — J32.0 CHRONIC MAXILLARY SINUSITIS: Primary | ICD-10-CM

## 2023-06-13 NOTE — TELEPHONE ENCOUNTER
Reason for call: Request for results.    Name of test or procedure: Overnight oxymetry    Date of test or procedure: 6/1/23    Location of test or procedure: [facility]    Preferred method for responding to this message: Telephone Call    Phone number patient can be reached at: Cell number on file:    Telephone Information:   Mobile [phone]       If we can't reach you directly, may we leave a detailed response at the number you provided?Yes     Reason for call: Request for a referral.    Referral requested for what concern?  ENT    Have you already been seen by the specialty you need the referral for?  No    If no,  Where do you want to go?   Dr. Lares [facility]    Preferred method for responding to this message: Telephone Call    Phone number patient can be reached at? Cell number on file:    Telephone Information:   Mobile [phone]       If we can't reach you directly, may we leave a detailed response at the number you provided? Yes     Please call patient.    Heather Peterson on 6/13/2023 at 1:45 PM

## 2023-06-20 NOTE — TELEPHONE ENCOUNTER
Patient called and requested a call back regarding the test results.    Evangelina Betts on 6/20/2023 at 1:37 PM

## 2023-09-12 ENCOUNTER — OFFICE VISIT (OUTPATIENT)
Dept: OTOLARYNGOLOGY | Facility: OTHER | Age: 33
End: 2023-09-12
Attending: NURSE PRACTITIONER
Payer: COMMERCIAL

## 2023-09-12 DIAGNOSIS — J33.9 NASAL POLYP: Primary | ICD-10-CM

## 2023-09-12 PROCEDURE — G0463 HOSPITAL OUTPT CLINIC VISIT: HCPCS

## 2023-09-12 NOTE — NURSING NOTE
Patient presents today for sinus issues.    Medication Reconciliation Complete    Maritza Bhandari LPN  9/12/2023 2:41 PM

## 2023-09-25 NOTE — PROGRESS NOTES
document embedded image  Patient Name: Myrna Martinez    Address: [address] [facility] [address]    YOB: 1990    [place] [address]    MR Number: [id number]    Phone: [phone]  PCP: OUTPATIENT,[facility] MD            Appointment Date: 09/12/23   Visit Provider: George Lares MD    cc: OUTPATIENT,[facility] MD; ~    ENT Progress Note  Intake  Visit Reasons: Sinus issues    HPI  History of Present Illness  Chief complaint:  Nasal and sinus issues    History  The patient is a 33-year-old female who comes to the office today very frustrated with chronic decreased sense of smell, decreased nasal airway, midfacial pressure and discomfort, and mucous drainage.  She had allergy testing in the distant past that revealed a few sensitivities.  She had a CT scan of her paranasal sinuses obtained in January of 2022 that showed some scattered sinus disease.  She is tried nasal steroid sprays in the past.  She is frustrated by continued symptoms.  She is never had surgery on her paranasal sinuses.     Exam  Nasal-there are polyps in both middle meati worse on the right.  There is no purulence noted.  Oral cavity oropharynx, neck, head and neck integument-Clear  General-the patient appears well and in no distress  Neuro-there are no focal cranial nerve deficits  CT-I personally reviewed her CT from January of 2022.  She does appear to have bilateral maxillary sinus disease and some scattered ethmoid disease.  There are no intranasal masses on this study.    A&P  Assessment & Plan  (1) Nasal polyps:        Status: Acute        Code(s):  J33.9 - Nasal polyp, unspecified  The patient was counseled that she appears to develop nasal polyps.  We will get her started on an antihistamine, Flonase, Singulair, and nasal saline irrigations.  Give her a burst and taper of prednisone.  We will check her back in 3 months to see if she is satisfied with symptom control.                 Medications:  New  montelukast 10 mg  PO  DAILY 90 tabs 3RF      prednisone     40 mg po daily for 7 days, then 30 mg po daily for 2 days, then 20 mg daily for 2 days, then 10 mg daily for 2 days, then d/c 10 mg  PO DAILY 40 tabs 0RF      fluticasone propionate 50 mcg/actuation     administer into each nostril 2 sprays  intranasal DAILY 47.4 grams 3RF                     George Lares MD    Filed: 09/13/23 2027    <Electronically signed by George Lares MD> 09/13/23 [id number]

## 2023-11-14 ENCOUNTER — OFFICE VISIT (OUTPATIENT)
Dept: FAMILY MEDICINE | Facility: OTHER | Age: 33
End: 2023-11-14
Attending: FAMILY MEDICINE
Payer: COMMERCIAL

## 2023-11-14 VITALS
TEMPERATURE: 99 F | OXYGEN SATURATION: 98 % | RESPIRATION RATE: 18 BRPM | HEIGHT: 62 IN | BODY MASS INDEX: 33.49 KG/M2 | SYSTOLIC BLOOD PRESSURE: 114 MMHG | DIASTOLIC BLOOD PRESSURE: 68 MMHG | WEIGHT: 182 LBS | HEART RATE: 90 BPM

## 2023-11-14 DIAGNOSIS — K52.9 CHRONIC DIARRHEA: ICD-10-CM

## 2023-11-14 DIAGNOSIS — Z83.3 FAMILY HISTORY OF DIABETES MELLITUS: ICD-10-CM

## 2023-11-14 DIAGNOSIS — R53.82 CHRONIC FATIGUE: Primary | ICD-10-CM

## 2023-11-14 PROBLEM — Z30.09 FAMILY PLANNING: Status: RESOLVED | Noted: 2017-12-27 | Resolved: 2023-11-14

## 2023-11-14 PROBLEM — G43.909 MIGRAINE: Status: RESOLVED | Noted: 2020-07-06 | Resolved: 2023-11-14

## 2023-11-14 LAB
BASOPHILS # BLD AUTO: 0.1 10E3/UL (ref 0–0.2)
BASOPHILS NFR BLD AUTO: 1 %
EOSINOPHIL # BLD AUTO: 0.2 10E3/UL (ref 0–0.7)
EOSINOPHIL NFR BLD AUTO: 3 %
ERYTHROCYTE [DISTWIDTH] IN BLOOD BY AUTOMATED COUNT: 12.6 % (ref 10–15)
ERYTHROCYTE [SEDIMENTATION RATE] IN BLOOD BY WESTERGREN METHOD: 2 MM/HR (ref 0–20)
FERRITIN SERPL-MCNC: 208 NG/ML (ref 6–175)
HBA1C MFR BLD: 5.2 % (ref 4–6.2)
HCT VFR BLD AUTO: 37.7 % (ref 35–47)
HGB BLD-MCNC: 12.9 G/DL (ref 11.7–15.7)
IMM GRANULOCYTES # BLD: 0 10E3/UL
IMM GRANULOCYTES NFR BLD: 0 %
LYMPHOCYTES # BLD AUTO: 2.4 10E3/UL (ref 0.8–5.3)
LYMPHOCYTES NFR BLD AUTO: 29 %
MCH RBC QN AUTO: 31.7 PG (ref 26.5–33)
MCHC RBC AUTO-ENTMCNC: 34.2 G/DL (ref 31.5–36.5)
MCV RBC AUTO: 93 FL (ref 78–100)
MONOCYTES # BLD AUTO: 0.5 10E3/UL (ref 0–1.3)
MONOCYTES NFR BLD AUTO: 6 %
NEUTROPHILS # BLD AUTO: 5.2 10E3/UL (ref 1.6–8.3)
NEUTROPHILS NFR BLD AUTO: 61 %
NRBC # BLD AUTO: 0 10E3/UL
NRBC BLD AUTO-RTO: 0 /100
PLATELET # BLD AUTO: 274 10E3/UL (ref 150–450)
RBC # BLD AUTO: 4.07 10E6/UL (ref 3.8–5.2)
WBC # BLD AUTO: 8.5 10E3/UL (ref 4–11)

## 2023-11-14 PROCEDURE — G0463 HOSPITAL OUTPT CLINIC VISIT: HCPCS

## 2023-11-14 PROCEDURE — 82728 ASSAY OF FERRITIN: CPT | Mod: ZL | Performed by: FAMILY MEDICINE

## 2023-11-14 PROCEDURE — 85025 COMPLETE CBC W/AUTO DIFF WBC: CPT | Mod: ZL | Performed by: FAMILY MEDICINE

## 2023-11-14 PROCEDURE — 83036 HEMOGLOBIN GLYCOSYLATED A1C: CPT | Mod: ZL | Performed by: FAMILY MEDICINE

## 2023-11-14 PROCEDURE — 99214 OFFICE O/P EST MOD 30 MIN: CPT | Performed by: FAMILY MEDICINE

## 2023-11-14 PROCEDURE — 36415 COLL VENOUS BLD VENIPUNCTURE: CPT | Mod: ZL | Performed by: FAMILY MEDICINE

## 2023-11-14 PROCEDURE — 85652 RBC SED RATE AUTOMATED: CPT | Mod: ZL | Performed by: FAMILY MEDICINE

## 2023-11-14 RX ORDER — LORATADINE 10 MG/1
10 TABLET ORAL DAILY
COMMUNITY
Start: 2023-11-14 | End: 2024-03-25

## 2023-11-14 ASSESSMENT — ANXIETY QUESTIONNAIRES
5. BEING SO RESTLESS THAT IT IS HARD TO SIT STILL: SEVERAL DAYS
4. TROUBLE RELAXING: NOT AT ALL
IF YOU CHECKED OFF ANY PROBLEMS ON THIS QUESTIONNAIRE, HOW DIFFICULT HAVE THESE PROBLEMS MADE IT FOR YOU TO DO YOUR WORK, TAKE CARE OF THINGS AT HOME, OR GET ALONG WITH OTHER PEOPLE: EXTREMELY DIFFICULT
7. FEELING AFRAID AS IF SOMETHING AWFUL MIGHT HAPPEN: NEARLY EVERY DAY
3. WORRYING TOO MUCH ABOUT DIFFERENT THINGS: NEARLY EVERY DAY
2. NOT BEING ABLE TO STOP OR CONTROL WORRYING: NEARLY EVERY DAY
GAD7 TOTAL SCORE: 15
GAD7 TOTAL SCORE: 15
6. BECOMING EASILY ANNOYED OR IRRITABLE: MORE THAN HALF THE DAYS
1. FEELING NERVOUS, ANXIOUS, OR ON EDGE: NEARLY EVERY DAY

## 2023-11-14 ASSESSMENT — PATIENT HEALTH QUESTIONNAIRE - PHQ9
10. IF YOU CHECKED OFF ANY PROBLEMS, HOW DIFFICULT HAVE THESE PROBLEMS MADE IT FOR YOU TO DO YOUR WORK, TAKE CARE OF THINGS AT HOME, OR GET ALONG WITH OTHER PEOPLE: EXTREMELY DIFFICULT
SUM OF ALL RESPONSES TO PHQ QUESTIONS 1-9: 20
SUM OF ALL RESPONSES TO PHQ QUESTIONS 1-9: 20

## 2023-11-14 ASSESSMENT — PAIN SCALES - GENERAL: PAINLEVEL: NO PAIN (0)

## 2023-11-14 ASSESSMENT — ENCOUNTER SYMPTOMS: FATIGUE: 1

## 2023-11-14 NOTE — NURSING NOTE
Patient is here for concerns of feeling tired, having dark circles under her eyes, concerns with her stool consistency, feels exhausted. Has been on going for a while, has discuss before but is not changing.  States her therapist had recommended having some labs checked.     .No LMP recorded (lmp unknown). Patient has had an implant.  Medication Reconciliation: complete    Nolvia Garcia LPN 11/14/2023 9:47 AM       Advance care directive on file? no  Advance care directive provided to patient? no       Nolvia Garcia LPN,

## 2023-11-14 NOTE — COMMUNITY RESOURCES LIST (ENGLISH)
11/14/2023   [facility]  N/A  For questions about this resource list or additional care needs, please contact your primary care clinic or care manager.  Phone: [phone]   Email: N/A   Address: [address] [place] [address]   Hours: N/A        Food and Nutrition       Food pantry  1  [facility] Distance: 2.5 miles      In-Person   [address]  [place] [address]  Language: English  Hours: Mon - Thu 11:00 AM - 3:30 PM  Fees: Free   Phone: (426) 993-2398 Email: info@[employer] Website: https://[employer]     2  [facility] Food Shelf Distance: 13.9 miles      [facility]   [address] [place] [address]  Language: English  Hours: Thu 10:00 AM - 1:00 PM  Fees: Free   Phone: (461) 588-5542 Email: luke@[employer] Website: https://www.[email or web address]/[place]AreaFoodShelf/     SNAP application assistance  3  [facility] Distance: 2.05 miles      [address] [place] [address]  Language: English  Hours: Mon - Fri 8:00 AM - 4:30 PM  Fees: Free   Phone: (167) 625-3181 Website: https://www.\[facility] [place] [facility]\""./[facility]/Health-Human-Services     4  [facility] Distance: 2.05 miles      In-Person, Phone/Virtual   [address] [place] [address]  Language: English  Hours: Mon 8:00 AM - 4:30 PM Appt. Only, Tue - Thu 8:00 AM - 4:30 PM , Fri 8:00 AM - 4:30 PM Appt. Only  Fees: Free   Phone: (625) 937-2220 Website: http://www.AngelPrime.org          Important Numbers & Websites       Emergency Services   911  [facility] Services   311  Poison Control   (318) 380-9075  Suicide Prevention Lifeline   (204) 316-4801 (TALK)  Child Abuse Hotline   (922) 796-5984 (4-A-Child)  Sexual Assault Hotline   (349) 899-9481 (HOPE)  National Runaway Safeline   (785) 408-5186 (RUNAWAY)  All-Options Talkline   (265) 383-4270  Substance Abuse Referral    (903) 438-4152 (HELP)

## 2023-11-14 NOTE — COMMUNITY RESOURCES LIST (ENGLISH)
11/14/2023   [facility]  N/A  For additional resource needs, please contact your health insurance member services or your primary care team.  Phone: [phone]   Email: N/A   Address: [facility] [place] [address]   Hours: N/A        Food and Nutrition       Food pantry  1  [facility] Distance: 2.5 miles      In-Person   [address] [place] [address]  Language: English  Hours: Mon - Thu 11:00 AM - 3:30 PM  Fees: Free   Phone: (575) 374-6660 Email: info@[employer] Website: https://[employer]     2  [facility] Food Shelf Distance: 13.9 miles      [facility]   [address]  [place] [address]  Language: English  Hours: Thu 10:00 AM - 1:00 PM  Fees: Free   Phone: (971) 246-1380 Email: luke@[employer] Website: https://www.[employer].com/[place]AreaFoodShelf/     SNAP application assistance  3  [facility] Distance: 2.05 miles      [address] [place] [address]  Language: English  Hours: Mon - Fri 8:00 AM - 4:30 PM  Fees: Free   Phone: (759) 656-1920 Website: https://www.[facility]./[facility]/Health-Human-Services     4  [facility] Distance: 2.05 miles      In-Person, Phone/Virtual   [address] [place] [address]  Language: English  Hours: Mon 8:00 AM - 4:30 PM Appt. Only, Tue - Thu 8:00 AM - 4:30 PM , Fri 8:00 AM - 4:30 PM Appt. Only  Fees: Free   Phone: (749) 224-6430 Website: http://www.aeoa.org          Important Numbers & Websites       [address].org  Poison Control   (286) 253-3540 [place]po[place]  Suicide and Crisis Lifeline   [address] [place], [place] [address]line.org  Childhelp National Child Abuse Hotline   404.322.8380 Childhelphotline.org  National Sexual Assault Hotline   (720) 125-4667 (HOPE) Rainn.org  National Runaway Safeline   (587) 190-6925 (RUNAWAY) 1800runaProficient.org  Pregnancy &  Postpartum Support [place]   Call/text 835-240-1350 Ppsupportmn.org  Substance Abuse National Helpline ([facility]   135-209-HELP (5350) Findtreatment.gov  Emergency Services   915

## 2023-11-14 NOTE — PROGRESS NOTES
"  Assessment & Plan     Chronic fatigue  Likely multifactorial.  Denies depressive symptoms.  Reviewed previous labs done in May 2023 which included a normal CBC, normal vitamin D and B12 level.  She was on scheduled for overnight oximetry which was normal.  She reports having excessive limb movements at night and jerking.  Recommend proceeding with formal overnight sleep study.    Repeat CBC and ferritin.  We will add A1c.  - Ferritin; Future  - CBC and Differential; Future  - Hemoglobin A1c; Future  - Adult Sleep Eval & Management  Referral; Future    Chronic diarrhea  Lifelong loose stools.  No blood or mucus.  Sed rate to rule out inflammatory bowel disease.  - Sedimentation Rate (ESR); Future    Family history of diabetes mellitus  Recommend A1c.       BMI:   Estimated body mass index is 33.29 kg/m  as calculated from the following:    Height as of this encounter: 1.575 m (5' 2\").    Weight as of this encounter: 82.6 kg (182 lb).       No follow-ups on file.    Rayna Alas MD  [facility]   Myrna is a 33 year old, presenting for the following health issues:    Nursing Notes:   Nolvia Garcia LPN  11/14/2023  9:47 AM  Sign at exiting of workspace  Patient is here for concerns of feeling tired, having dark circles under her eyes, concerns with her stool consistency, feels exhausted. Has been on going for a while, has discuss before but is not changing.  States her therapist had recommended having some labs checked.     34 yo female presents with profound fatigue.    Home oximetry was normal    Bilateral hand numbness at night.    Chronic congestion, PND, clearing her throat    930 pm-730 am, SL normal, feels exhausted.    Does not exercise or eat well    Chronic diarrhea, no blood or mucous    Weight stable    Previous lab work in 5/2023 was normal    Fatigue  Associated symptoms include fatigue.   History of Present Illness       Reason for visit:  " "Tired and weak, constant diareahh    She eats 0-1 servings of fruits and vegetables daily.She consumes 1 sweetened beverage(s) daily.She exercises with enough effort to increase her heart rate 9 or less minutes per day.  She exercises with enough effort to increase her heart rate 3 or less days per week.   She is taking medications regularly.             Review of Systems   Constitutional:  Positive for fatigue.      Constitutional, HEENT, cardiovascular, pulmonary, gi and gu systems are negative, except as otherwise noted.      Objective    /68   Pulse 90   Temp 99  F (37.2  C) (Tympanic)   Resp 18   Ht 1.575 m (5' 2\")   Wt 82.6 kg (182 lb)   LMP  (LMP Unknown)   SpO2 98%   Breastfeeding No   BMI 33.29 kg/m    Body mass index is 33.29 kg/m .  Physical Exam   GENERAL: healthy, alert and no distress  HENT: ear canals and TM's normal, nose and mouth without ulcers or lesions  NECK: no adenopathy, no asymmetry, masses, or scars and thyroid normal to palpation  CV: regular rate and rhythm, normal S1 S2, no S3 or S4, no murmur, click or rub, no peripheral edema and peripheral pulses strong  PSYCH: mentation appears normal and affect flat                      "

## 2023-11-20 DIAGNOSIS — R53.82 CHRONIC FATIGUE: Primary | ICD-10-CM

## 2023-11-27 ENCOUNTER — THERAPY VISIT (OUTPATIENT)
Dept: SLEEP MEDICINE | Facility: OTHER | Age: 33
End: 2023-11-27
Attending: FAMILY MEDICINE
Payer: COMMERCIAL

## 2023-11-27 DIAGNOSIS — R53.82 CHRONIC FATIGUE: ICD-10-CM

## 2023-11-27 PROCEDURE — 95810 POLYSOM 6/> YRS 4/> PARAM: CPT

## 2023-11-27 PROCEDURE — 95810 POLYSOM 6/> YRS 4/> PARAM: CPT | Mod: 26 | Performed by: FAMILY MEDICINE

## 2023-11-30 NOTE — PROGRESS NOTES
Diagnostic testing showed all stages of sleep at a 97% efficiency. Light snoring was noted with RERAs. RDI was 2.3 and Oxygen stayed in the 90s for the duration of testing.

## 2023-12-03 NOTE — PROCEDURES
"[facility]   SLEEP STUDY INTERPRETATION  DIAGNOSTIC POLYSOMNOGRAPHY REPORT      Patient: PEDRO MELGOZA  YOB: 1990  Study Date: 11/27/2023  MRN: [id number]  Referring Provider: Guy Alas MD  Ordering Provider: Néstor Knutson MD    Indications for Polysomnography: The patient is a 33 year old Female who is 5' 2\" and weighs 182.0 lbs. Her BMI is 33.5, [place] sleepiness scale 7 and neck circumference is - cm. Relevant medical history includes NICHOLE, MDD, obesity. A diagnostic polysomnogram was performed to evaluate for sleep apnea.    Polysomnogram Data: A full night polysomnogram recorded the standard physiologic parameters including EEG, EOG, EMG, ECG, nasal and oral airflow. Respiratory parameters of chest and abdominal movements were recorded with respiratory inductance plethysmography. Oxygen saturation was recorded by pulse oximetry. Hypopnea scoring rule used: 1B 4%.    Sleep Architecture: Decreased sleep latency, delayed REM latency, high sleep efficiency, increased percentage of N3.  Normal arousal index.  The total recording time of the polysomnogram was 439.3 minutes. The total sleep time was 426.0 minutes. Sleep latency was decreased at 3.9 minutes without the use of a sleep aid. REM latency was 122.5 minutes. Arousal index was normal at 9.3 arousals per hour. Sleep efficiency was normal at 97.0%. Wake after sleep onset was 9.5 minutes. The patient spent 6.1% of total sleep time in Stage N1, 37.7% in Stage N2, 32.3% in Stage N3, and 23.9% in REM. Time in REM supine was 23.5 minutes.    Respiration: No sleep-disordered breathing (AHI 0.3) without sleep-associated hypoxemia.  Events ? The polysomnogram revealed a presence of - obstructive, 1 central, and - mixed apneas resulting in an apnea index of 0.1 events per hour. There were 1 obstructive hypopneas and - central hypopneas resulting in an obstructive hypopnea index of 0.1 and central hypopnea index of - events per hour. " The combined apnea/hypopnea index was 0.3 events per hour (central apnea/hypopnea index was 0.1 events per hour). The REM AHI was 0.6 events per hour. The supine AHI was - events per hour. The RERA index was 2.0 events per hour.  The RDI was 2.3 events per hour.  Snoring - was reported as absent.  Respiratory rate and pattern - was notable for normal respiratory rate and pattern.  Sustained Sleep Associated Hypoventilation - Transcutaneous carbon dioxide monitoring was not used, however significant hypoventilation was not suggested by oximetry.  Sleep Associated Hypoxemia - (Greater than 5 minutes O2 sat at or below 88%) was not present. Baseline oxygen saturation was 97.0%. Lowest oxygen saturation was 93.0%. Time spent less than or equal to 88% was 0 minutes. Time spent less than or equal to 89% was 0 minutes.    Movement Activity: Rare PLM's observed.  Periodic Limb Activity - There were 4 PLMs during the entire study. The PLM index was 0.6 movements per hour. The PLM Arousal Index was - per hour.  REM EMG Activity - Excessive transient/sustained muscle activity was not present.  Nocturnal Behavior - Abnormal sleep related behaviors were not noted during/arising out of NREM / REM sleep.   Bruxism - None apparent.    Cardiac Summary: Appears NSR  The average pulse rate was 87.8 bpm. The minimum pulse rate was 64.0 bpm while the maximum pulse rate was 119.0 bpm.      Assessment:   Decreased sleep latency, delayed REM latency, high sleep efficiency, increased percentage of N3.  Normal arousal index.  No sleep-disordered breathing (AHI 0.3) without sleep-associated hypoxemia.  Rare PLM's observed.    Recommendations:  Given short sleep onset latency, high sleep efficiency, increased percentage of N3, consider potential for organic hypersomnia - specifically idiopathic hypersomnia.  Advice regarding the risks of drowsy driving.  Suggest optimizing sleep schedule and avoiding sleep deprivation.  Weight management (if  BMI > 30).  Pharmacologic therapy should be used for management of restless legs syndrome only if present and clinically indicated and not based on the presence of periodic limb movements alone.    Diagnostic Codes:   Unspecified Sleep Disturbance G47.9     _____________________________________   Electronically Signed By: Néstor Knutson MD (12/3/2023)

## 2023-12-17 NOTE — PROGRESS NOTES
"Virtual Visit Details    Type of service:  Video Visit     Myrna Martinez is a 33 year old female who is being evaluated via a billable video visit.       The patient has been notified of following:      \"This video visit will be conducted via a call between you and your physician/provider. We have found that certain health care needs can be provided without the need for an in-person physical exam.  This service lets us provide the care you need with a video conversation.  If a prescription is necessary we can send it directly to your pharmacy.  If lab work is needed we can place an order for that and you can then stop by our lab to have the test done at a later time.     Video visits are billed at different rates depending on your insurance coverage.  Please reach out to your insurance provider with any questions.     If during the course of the call the physician/provider feels a video visit is not appropriate, you will not be charged for this service.\"     Patient has given verbal consent for Video visit? Yes  How would you like to obtain your AVS? Mail a copy  If you are dropped from the video visit, the video invite should be resent to: Text to cell phone: -  Will anyone else be joining your video visit? No  If patient encounters technical issues they should call [phone]      Video-Visit Details     Type of service:  Video Visit     Start Time:  4pm  End Time:  4:25pm    Originating Location (pt. Location): Home     Distant Location (provider location):  Off-site, [facility]       Platform used for Video Visit: Flomio    Virtual visit for review of sleep testing results.     A/P:     1.)  No sleep-disordered breathing (AHI 0.3) without sleep-associated hypoxemia.     2.)  Excessive daytime fatigue with some increased clinical concern for potential idiopathic hypersomnia    Noted on PSG for short sleep onset latency, high sleep efficiency, increased percentage of N3.  " "Clinically, report of longer sleep needed potentially 8-10 hours or longer, along with some amount of sleep inertia.    Comorbid depression, anxiety, question for ADD or ADHD.  History of polysubstance abuse (drugs of choice cocaine, marijuana) with multiple years sobriety.    We discussed options including formal hypersomnia testing with actigraphy linked to PSG and MSLT, versus empiric trial of modafinil.    We agreed to start with a trial modafinil 100-200 milligrams in the morning with follow-up in 2-4 weeks.  If no significant improvement, we could wait for the results of the assessment for ADD/ADHD or move forward with hypersomnia testing.     2.)  Potential excessive alcohol use  3.)  Chronic nightmares      SUBJECTIVE:  Myrna Martinez is a 33 year old female.    33 year old female who is referred for daytime fatigue.     Pertinent PMHx of NICHOLE, MDD, obesity.     STOP-BANG score of 3, with unknown neck circumference.  Randolph score of 7.     BMI of Estimated body mass index is 33.29 kg/m  as calculated from the following:    Height as of 11/14/23: 1.575 m (5' 2\").    Weight as of 11/14/23: 82.6 kg (182 lb).      Today -we reviewed her sleep history more detail as well as her sleep study results.  She is starting evaluation for ADD and ADHD, there is a report from her mother that she was diagnosed as a child.    Chief concern per questionnaire: \"Even with adequate amount of sleep im exhausted all day every day and people always tell me i look tired \"     Goals for visit per questionnaire: \"Help figure out why im always exhausted, weak and tired \"     Sxs for 6+ months.     Caffeine use:  No for 3+ per day.  No for within 6 hours of bed.     Yes for alcohol to help sleep and near bed time.     Sleep pattern:  Workdays.  8pm to 5-7am.  Weekends.  8-10pm to 5-7am.  Time to fall asleep: ~30-60 minutes.  Awakenings: 1-3 times per night, usually quickly to return to sleep unless nightmares.  Average total sleep " "time:  8-10 hours  Napping.  0 days per week, - hours per nap.     Yes to phone / TV in bed.     No for RLS screen.  No for sleep walking.  No for dream enactment behavior.  No for bruxism.     Yes for recurring nightmares, night terrors.     No for morning headaches.  Yes for snoring.  No for observed apnea.  Yes for FHx of LONNIE - father.    SLEEP STUDY INTERPRETATION  DIAGNOSTIC POLYSOMNOGRAPHY REPORT        Patient: PEDRO MELGOZA  YOB: 1990  Study Date: 11/27/2023  MRN: [id number]  Referring Provider: Guy Alas MD  Ordering Provider: Néstor Knutson MD     Indications for Polysomnography: The patient is a 33 year old Female who is 5' 2\" and weighs 182.0 lbs. Her BMI is 33.5, [place] sleepiness scale 7 and neck circumference is - cm. Relevant medical history includes NICHOLE, MDD, obesity. A diagnostic polysomnogram was performed to evaluate for sleep apnea.     Polysomnogram Data: A full night polysomnogram recorded the standard physiologic parameters including EEG, EOG, EMG, ECG, nasal and oral airflow. Respiratory parameters of chest and abdominal movements were recorded with respiratory inductance plethysmography. Oxygen saturation was recorded by pulse oximetry. Hypopnea scoring rule used: 1B 4%.     Sleep Architecture: Decreased sleep latency, delayed REM latency, high sleep efficiency, increased percentage of N3.  Normal arousal index.  The total recording time of the polysomnogram was 439.3 minutes. The total sleep time was 426.0 minutes. Sleep latency was decreased at 3.9 minutes without the use of a sleep aid. REM latency was 122.5 minutes. Arousal index was normal at 9.3 arousals per hour. Sleep efficiency was normal at 97.0%. Wake after sleep onset was 9.5 minutes. The patient spent 6.1% of total sleep time in Stage N1, 37.7% in Stage N2, 32.3% in Stage N3, and 23.9% in REM. Time in REM supine was 23.5 minutes.     Respiration: No sleep-disordered breathing (AHI 0.3) " without sleep-associated hypoxemia.  Events ? The polysomnogram revealed a presence of - obstructive, 1 central, and - mixed apneas resulting in an apnea index of 0.1 events per hour. There were 1 obstructive hypopneas and - central hypopneas resulting in an obstructive hypopnea index of 0.1 and central hypopnea index of - events per hour. The combined apnea/hypopnea index was 0.3 events per hour (central apnea/hypopnea index was 0.1 events per hour). The REM AHI was 0.6 events per hour. The supine AHI was - events per hour. The RERA index was 2.0 events per hour.  The RDI was 2.3 events per hour.  Snoring - was reported as absent.  Respiratory rate and pattern - was notable for normal respiratory rate and pattern.  Sustained Sleep Associated Hypoventilation - Transcutaneous carbon dioxide monitoring was not used, however significant hypoventilation was not suggested by oximetry.  Sleep Associated Hypoxemia - (Greater than 5 minutes O2 sat at or below 88%) was not present. Baseline oxygen saturation was 97.0%. Lowest oxygen saturation was 93.0%. Time spent less than or equal to 88% was 0 minutes. Time spent less than or equal to 89% was 0 minutes.     Movement Activity: Rare PLM's observed.  Periodic Limb Activity - There were 4 PLMs during the entire study. The PLM index was 0.6 movements per hour. The PLM Arousal Index was - per hour.  REM EMG Activity - Excessive transient/sustained muscle activity was not present.  Nocturnal Behavior - Abnormal sleep related behaviors were not noted during/arising out of NREM / REM sleep.   Bruxism - None apparent.     Cardiac Summary: Appears NSR  The average pulse rate was 87.8 bpm. The minimum pulse rate was 64.0 bpm while the maximum pulse rate was 119.0 bpm.       Assessment:   Decreased sleep latency, delayed REM latency, high sleep efficiency, increased percentage of N3.  Normal arousal index.  No sleep-disordered breathing (AHI 0.3) without sleep-associated  hypoxemia.  Rare PLM's observed.     Recommendations:  Given short sleep onset latency, high sleep efficiency, increased percentage of N3, consider potential for organic hypersomnia - specifically idiopathic hypersomnia.  Advice regarding the risks of drowsy driving.  Suggest optimizing sleep schedule and avoiding sleep deprivation.  Weight management (if BMI > 30).  Pharmacologic therapy should be used for management of restless legs syndrome only if present and clinically indicated and not based on the presence of periodic limb movements alone.     Diagnostic Codes:   Unspecified Sleep Disturbance G47.9     _____________________________________   Electronically Signed By: Néstor Knutson MD (12/3/2023)       Past medical history:    Patient Active Problem List    Diagnosis Date Noted    Papanicolaou smear of cervix with low grade squamous intraepithelial lesion (LGSIL) 11/30/2021     Priority: Medium     Formatting of this note might be different from the original.  Hx LSIL, unknown date? Prior to 2015 5/23/17 NIL pap, neg HPV  3/18/21 NIL pap. Plan: pap in 3 years per visit notes      NICHOLE (generalized anxiety disorder) 11/09/2016     Priority: Medium    ACP (advance care planning) 06/23/2016     Priority: Medium     Advance Care Planning 6/23/2016: ACP Review of Chart / Resources Provided:  Reviewed chart for advance care plan.  Myrna Martinez has no plan or code status on file. Discussed available resources and provided with information. Confirmed code status reflects current choices pending further ACP discussions.  Confirmed/documented legally designated decision makers.  Added by Asiya Martinez            Major depressive disorder, recurrent episode, moderate (H) 03/18/2016     Priority: Medium    Herpes genitalis in women 03/18/2016     Priority: Medium       10 point ROS of systems including Constitutional, Eyes, Respiratory, Cardiovascular, Gastroenterology, Genitourinary, Integumentary,  Muscularskeletal, Psychiatric were all negative except for pertinent positives noted in my HPI.    Current Outpatient Medications   Medication Sig Dispense Refill    budesonide (RINOCORT AQUA) 32 MCG/ACT nasal spray Spray 1 spray into both nostrils At Bedtime 8.43 mL 0    etonogestrel (IMPLANON/NEXPLANON) 68 MG IMPL 1 each (68 mg) by Subdermal route continuous  0    loratadine (CLARITIN) 10 MG tablet Take 1 tablet (10 mg) by mouth daily      valACYclovir (VALTREX) 500 MG tablet Take 1 tablet (500 mg) by mouth 2 times daily for 5 days 10 tablet 1       OBJECTIVE:  LMP  (LMP Unknown)     Physical Exam     ---  This note was written with the assistance of the Dragon voice-dictation technology software. The final document, although reviewed, may contain errors. For corrections, please contact the office.    Total time spent preparing to see the patient, review of chart, obtaining history and physical examination, review of sleep testing, review of treatment options, education, discussion with patient and documenting in Epic / EMR was 30 minutes.  All time involved was spent on the day of service for the patient (the same day as the patient's appointment).    Néstor Knutson MD    Sleep Medicine  [facility]  - [place], [place]  Main Office: [phone]  [facility] [place]  [address] [place] [address], [address]  Schedule visits: [phone]  Main Office: [phone]  Fax: [phone]

## 2023-12-18 ENCOUNTER — VIRTUAL VISIT (OUTPATIENT)
Dept: PULMONOLOGY | Facility: OTHER | Age: 33
End: 2023-12-18
Attending: FAMILY MEDICINE
Payer: COMMERCIAL

## 2023-12-18 VITALS — BODY MASS INDEX: 31.28 KG/M2 | HEIGHT: 62 IN | WEIGHT: 170 LBS

## 2023-12-18 DIAGNOSIS — F32.A FATIGUE DUE TO DEPRESSION: Primary | ICD-10-CM

## 2023-12-18 DIAGNOSIS — R53.83 FATIGUE DUE TO DEPRESSION: Primary | ICD-10-CM

## 2023-12-18 PROCEDURE — 99213 OFFICE O/P EST LOW 20 MIN: CPT | Mod: VID | Performed by: FAMILY MEDICINE

## 2023-12-18 RX ORDER — MODAFINIL 200 MG/1
100-200 TABLET ORAL EVERY MORNING
Qty: 30 TABLET | Refills: 3 | Status: SHIPPED | OUTPATIENT
Start: 2023-12-18 | End: 2024-03-25

## 2023-12-18 ASSESSMENT — PAIN SCALES - GENERAL: PAINLEVEL: NO PAIN (0)

## 2023-12-18 ASSESSMENT — PATIENT HEALTH QUESTIONNAIRE - PHQ9: SUM OF ALL RESPONSES TO PHQ QUESTIONS 1-9: 16

## 2023-12-18 NOTE — NURSING NOTE
PHQ9 = 16    Depression Response    Patient completed the PHQ-9 assessment for depression and scored >9? Yes  Question 9 on the PHQ-9 was positive for suicidality? No  Does patient have current mental health provider? No    Is this a virtual visit? Yes   Does patient have suicidal ideation (positive question 9)? No - offer to place Mental Health Referral.  Patient declined referral/not needed    I personally notified the following: visit provider via notes and schedule message        Has patient had flu shot for current/most recent flu season? If so, when? Yes: 9/15/23      Is the patient currently in the state of [place]? YES    Visit mode:VIDEO    If the visit is dropped, the patient can be reconnected by: VIDEO VISIT: Text to cell phone:   Telephone Information:   Mobile [phone]       Will anyone else be joining the visit? NO  (If patient encounters technical issues they should call [phone])    How would you like to obtain your AVS? MyChart    Are changes needed to the allergy or medication list? No    Reason for visit: RECHECK    Mago MELENDREZ

## 2023-12-19 ENCOUNTER — OFFICE VISIT (OUTPATIENT)
Dept: OTOLARYNGOLOGY | Facility: OTHER | Age: 33
End: 2023-12-19
Payer: COMMERCIAL

## 2023-12-19 ENCOUNTER — TELEPHONE (OUTPATIENT)
Dept: PULMONOLOGY | Facility: OTHER | Age: 33
End: 2023-12-19

## 2023-12-19 DIAGNOSIS — J33.9 NASAL POLYP: Primary | ICD-10-CM

## 2023-12-19 PROCEDURE — G0463 HOSPITAL OUTPT CLINIC VISIT: HCPCS

## 2023-12-19 NOTE — TELEPHONE ENCOUNTER
Received Phone call from Pricila with [facility]  for medication: Modafinil 200MG tablets.    Since patient does NOT have a diagnosis of LONNIE, Narcolepsy, etc. Patient does not meet criteria and this is denied.     No further information was given as patient simply doesn't not meet criteria for coverage.  No further work for PA team.

## 2023-12-19 NOTE — TELEPHONE ENCOUNTER
Received PA request from [employer] for Modafinil 200MG tablets.  PA Submitted on CMM, waiting for response.

## 2023-12-21 DIAGNOSIS — F32.A FATIGUE DUE TO DEPRESSION: ICD-10-CM

## 2023-12-21 DIAGNOSIS — R53.83 FATIGUE DUE TO DEPRESSION: ICD-10-CM

## 2023-12-21 RX ORDER — MODAFINIL 200 MG/1
100-200 TABLET ORAL EVERY MORNING
Qty: 30 TABLET | Refills: 3 | OUTPATIENT
Start: 2023-12-21

## 2023-12-21 NOTE — PROGRESS NOTES
document embedded image  Patient Name: Myrna Martinez   Address: [address] [facility] [address]   YOB: 1990   [place] [address]   MR Number: [id number]   Phone: [phone]  PCP: Rayna Freeman           Appointment Date: 12/19/23  Visit Provider: George Lares MD    cc: Rayna Freeman; ~    ENT Progress Note    Intake  Visit Reasons: Sinus follow up    HPI  History of Present Illness  Chief complaint:  Follow up nasal polyps    History  The patient is a 33-year-old female who presents today for follow up after 3 months of nasal steroid, Singulair, nasal saline, and antihistamine use for nasal polyps.  She was given a burst and taper of steroids as well.  She states she had some initial improvement of symptoms but feels like she is back to her baseline at this time.  She had a CT scan obtained nearly 2 years ago of her paranasal sinuses it shows some opacification in her ethmoid sinuses.  It does give good detail of her bony landmarks.  She did have allergy testing approximately 5 years ago that revealed some sensitivities.    Exam   Nasal-he has boggy nasal membranes.  I can not see any polyp masses at this time  Oral cavity oropharynx free of lesions or inflammation   Neck-no masses or adenopathy the neck integument-Clear  General-the patient appears well and in no distress  Neuro-there are no focal cranial nerve deficits    A&P  Assessment & Plan  (1) Nasal polyps:         Status: Acute        Code(s):  J33.9 - Nasal polyp, unspecified  At this point having failed maximal medical therapy she would be a candidate for surgical debridement of her ethmoid sinuses.  This can be performed endoscopically under sedation.  The procedure was reviewed for her.  She was interested in proceeding.  We will make arrangements at her convenience.  That she may be a candidate for Dupixent therapy should she have recurrent symptoms following surgery.                George Lares  MD    Filed: 12/19/23 0728     <Electronically signed by George Lares MD> 12/20/23 1526

## 2024-01-09 ENCOUNTER — TELEPHONE (OUTPATIENT)
Dept: PULMONOLOGY | Facility: OTHER | Age: 34
End: 2024-01-09

## 2024-01-09 NOTE — TELEPHONE ENCOUNTER
Received an APPROVAL from [facility] for modafinil (PROVIGIL) 200 MG tablet. Effective dates 1/9/2024-1/7/2025.

## 2024-01-09 NOTE — TELEPHONE ENCOUNTER
Received a PA request from [employer] for modafinil (PROVIGIL) 200 MG tablet. Submitted on CMM. Waiting for a response.

## 2024-01-12 ENCOUNTER — OFFICE VISIT (OUTPATIENT)
Dept: FAMILY MEDICINE | Facility: OTHER | Age: 34
End: 2024-01-12
Attending: FAMILY MEDICINE
Payer: COMMERCIAL

## 2024-01-12 VITALS
OXYGEN SATURATION: 97 % | WEIGHT: 177.4 LBS | HEART RATE: 66 BPM | HEIGHT: 62 IN | DIASTOLIC BLOOD PRESSURE: 62 MMHG | RESPIRATION RATE: 16 BRPM | TEMPERATURE: 98.1 F | BODY MASS INDEX: 32.65 KG/M2 | SYSTOLIC BLOOD PRESSURE: 116 MMHG

## 2024-01-12 DIAGNOSIS — Z01.818 PREOP GENERAL PHYSICAL EXAM: ICD-10-CM

## 2024-01-12 DIAGNOSIS — J32.2 CHRONIC ETHMOIDAL SINUSITIS: Primary | ICD-10-CM

## 2024-01-12 PROBLEM — R87.612 PAPANICOLAOU SMEAR OF CERVIX WITH LOW GRADE SQUAMOUS INTRAEPITHELIAL LESION (LGSIL): Status: RESOLVED | Noted: 2021-11-30 | Resolved: 2024-01-12

## 2024-01-12 PROCEDURE — G0463 HOSPITAL OUTPT CLINIC VISIT: HCPCS

## 2024-01-12 PROCEDURE — 99213 OFFICE O/P EST LOW 20 MIN: CPT | Performed by: FAMILY MEDICINE

## 2024-01-12 ASSESSMENT — ANXIETY QUESTIONNAIRES
GAD7 TOTAL SCORE: 12
7. FEELING AFRAID AS IF SOMETHING AWFUL MIGHT HAPPEN: SEVERAL DAYS
GAD7 TOTAL SCORE: 12
GAD7 TOTAL SCORE: 12
3. WORRYING TOO MUCH ABOUT DIFFERENT THINGS: NEARLY EVERY DAY
1. FEELING NERVOUS, ANXIOUS, OR ON EDGE: SEVERAL DAYS
4. TROUBLE RELAXING: MORE THAN HALF THE DAYS
5. BEING SO RESTLESS THAT IT IS HARD TO SIT STILL: MORE THAN HALF THE DAYS
2. NOT BEING ABLE TO STOP OR CONTROL WORRYING: MORE THAN HALF THE DAYS
IF YOU CHECKED OFF ANY PROBLEMS ON THIS QUESTIONNAIRE, HOW DIFFICULT HAVE THESE PROBLEMS MADE IT FOR YOU TO DO YOUR WORK, TAKE CARE OF THINGS AT HOME, OR GET ALONG WITH OTHER PEOPLE: EXTREMELY DIFFICULT
6. BECOMING EASILY ANNOYED OR IRRITABLE: SEVERAL DAYS
7. FEELING AFRAID AS IF SOMETHING AWFUL MIGHT HAPPEN: SEVERAL DAYS
8. IF YOU CHECKED OFF ANY PROBLEMS, HOW DIFFICULT HAVE THESE MADE IT FOR YOU TO DO YOUR WORK, TAKE CARE OF THINGS AT HOME, OR GET ALONG WITH OTHER PEOPLE?: EXTREMELY DIFFICULT

## 2024-01-12 ASSESSMENT — PATIENT HEALTH QUESTIONNAIRE - PHQ9
10. IF YOU CHECKED OFF ANY PROBLEMS, HOW DIFFICULT HAVE THESE PROBLEMS MADE IT FOR YOU TO DO YOUR WORK, TAKE CARE OF THINGS AT HOME, OR GET ALONG WITH OTHER PEOPLE: EXTREMELY DIFFICULT
SUM OF ALL RESPONSES TO PHQ QUESTIONS 1-9: 12
SUM OF ALL RESPONSES TO PHQ QUESTIONS 1-9: 12

## 2024-01-12 ASSESSMENT — PAIN SCALES - GENERAL: PAINLEVEL: SEVERE PAIN (6)

## 2024-01-12 NOTE — NURSING NOTE
"Chief Complaint   Patient presents with    Pre-Op Exam      DOS 1-25-24, Removal of polyps       Initial /62   Pulse 66   Temp 98.1  F (36.7  C) (Tympanic)   Resp 16   Ht 1.575 m (5' 2\")   Wt 80.5 kg (177 lb 6.4 oz)   LMP  (LMP Unknown)   SpO2 97%   BMI 32.45 kg/m   Estimated body mass index is 32.45 kg/m  as calculated from the following:    Height as of this encounter: 1.575 m (5' 2\").    Weight as of this encounter: 80.5 kg (177 lb 6.4 oz).  Medication Reconciliation: complete    Maria Luz Coep LPN on 1/12/2024 at 2:40 PM     "

## 2024-01-12 NOTE — PROGRESS NOTES
[facility]  [address]  [place] [address]  Phone: [phone]  Fax: [phone]  Primary Provider: Rayna Anderson  Pre-op Performing Provider: RAYNA ANDERSON      PREOPERATIVE EVALUATION:  Today's date: 1/12/2024    Myrna is a 33 year old, presenting for the following:  Pre-Op Exam ( DOS 1-25-24, Removal of polyps)        Surgical Information:  Surgery/Procedure: Removal of polyps   Surgery Location: [facility]   Surgeon: Dr. Lares   Surgery Date: 1/25/24  Time of Surgery: TBD  Where patient plans to recover: At home with family  Fax number for surgical facility:     Assessment & Plan     The proposed surgical procedure is considered LOW risk.    Chronic ethmoidal sinusitis      Preop general physical exam        Possible Sleep Apnea:  {Sleep study was negative for obstructive sleep apnea     - No identified additional risk factors other than previously addressed    Antiplatelet or Anticoagulation Medication Instructions:   - Patient is on no antiplatelet or anticoagulation medications.    Additional Medication Instructions:  Patient is to take all scheduled medications on the day of surgery    RECOMMENDATION:  APPROVAL GIVEN to proceed with proposed procedure, without further diagnostic evaluation.            Subjective       HPI related to upcoming procedure:   33-year-old female presents for preoperative valuation.  Recently seen by Dr. Mojica who recommended ethmoid sinus surgery and possible polypectomy.    Recently evaluated by Dr. Knutson for chronic fatigue.  Sleep study was normal.  Started on Provigil.  Patient has a history of polysubstance abuse has been sober for many years.  She does vape nicotine throughout the day.  LMP irregular, light on Nexplanon          Health Care Directive:  Patient does not have a Health Care Directive or Living Will:     Preoperative Review of :   reviewed - no record of controlled  substances prescribed.          Review of Systems  CONSTITUTIONAL: NEGATIVE for fever, chills, change in weight  ENT/MOUTH: NEGATIVE for ear, mouth and throat problems  RESP: NEGATIVE for significant cough or SOB  CV: NEGATIVE for chest pain, palpitations or peripheral edema    Patient Active Problem List    Diagnosis Date Noted    Papanicolaou smear of cervix with low grade squamous intraepithelial lesion (LGSIL) 11/30/2021     Priority: Medium     Formatting of this note might be different from the original.  Hx LSIL, unknown date? Prior to 2015 5/23/17 NIL pap, neg HPV  3/18/21 NIL pap. Plan: pap in 3 years per visit notes      NICHOLE (generalized anxiety disorder) 11/09/2016     Priority: Medium    ACP (advance care planning) 06/23/2016     Priority: Medium     Advance Care Planning 6/23/2016: ACP Review of Chart / Resources Provided:  Reviewed chart for advance care plan.  Myrna Martinez has no plan or code status on file. Discussed available resources and provided with information. Confirmed code status reflects current choices pending further ACP discussions.  Confirmed/documented legally designated decision makers.  Added by Asiya Martinez            Major depressive disorder, recurrent episode, moderate (H) 03/18/2016     Priority: Medium    Herpes genitalis in women 03/18/2016     Priority: Medium      Past Medical History:   Diagnosis Date    [facility]     [facility], was [facility] at the time    Headaches, cluster     Herpes     Herpes genitalis in women     takes acyclovir    Papanicolaou smear of cervix with low grade squamous intraepithelial lesion (LGSIL)     Seasonal allergies      Past Surgical History:   Procedure Laterality Date    APPENDECTOMY      APPENDECTOMY  1996    HC REDUCTION OF LARGE BREAST      Description: Breast Surgery Reduction Procedure Bilateral;  Proc Date: 06/11/2012;    MAMMOPLASTY REDUCTION  2011     Current Outpatient Medications   Medication Sig Dispense Refill     budesonide (RINOCORT AQUA) 32 MCG/ACT nasal spray Spray 1 spray into both nostrils At Bedtime 8.43 mL 0    etonogestrel (IMPLANON/NEXPLANON) 68 MG IMPL 1 each (68 mg) by Subdermal route continuous  0    loratadine (CLARITIN) 10 MG tablet Take 1 tablet (10 mg) by mouth daily      modafinil (PROVIGIL) 200 MG tablet Take 0.5-1 tablets (100-200 mg) by mouth every morning .  Start with 0.5 tablets for 1 week, then increase to 1 tablet. 30 tablet 3    valACYclovir (VALTREX) 500 MG tablet Take 1 tablet (500 mg) by mouth 2 times daily for 5 days 10 tablet 1       No Known Allergies     Social History     Tobacco Use    Smoking status: Never     Passive exposure: Past    Smokeless tobacco: Never   Substance Use Topics    Alcohol use: Yes     Alcohol/week: 0.0 standard drinks of alcohol     Comment: Alcoholic Drinks/day: 1 drink/month       History   Drug Use Unknown     Comment: marijuana - quit a few weeks ago 8/8/18         Objective     LMP  (LMP Unknown)     Physical Exam  GENERAL APPEARANCE: healthy, alert and no distress  HENT: ear canals and TM's normal and nose and mouth without ulcers or lesions  RESP: lungs clear to auscultation - no rales, rhonchi or wheezes  CV: regular rate and rhythm, normal S1 S2, no S3 or S4 and no murmur, click or rub   NEURO: Normal strength and tone, sensory exam grossly normal, mentation intact and speech normal    Recent Labs   Lab Test 11/14/23  1027 05/10/23  1442   HGB 12.9 12.4    260   A1C 5.2  --         Diagnostics:  No labs were ordered during this visit.   No EKG required for low risk surgery (cataract, skin procedure, breast biopsy, etc).    Revised Cardiac Risk Index (RCRI):  The patient has the following serious cardiovascular risks for perioperative complications:   - No serious cardiac risks = 0 points     RCRI Interpretation: 0 points: Class I (very low risk - 0.4% complication rate)         Signed Electronically by: Rayna Alas MD  Copy of this  evaluation report is provided to requesting physician.

## 2024-02-26 ENCOUNTER — OFFICE VISIT (OUTPATIENT)
Dept: FAMILY MEDICINE | Facility: OTHER | Age: 34
End: 2024-02-26
Attending: PHYSICIAN ASSISTANT
Payer: COMMERCIAL

## 2024-02-26 VITALS
OXYGEN SATURATION: 99 % | DIASTOLIC BLOOD PRESSURE: 68 MMHG | TEMPERATURE: 98.2 F | WEIGHT: 179 LBS | RESPIRATION RATE: 20 BRPM | HEIGHT: 62 IN | HEART RATE: 98 BPM | BODY MASS INDEX: 32.94 KG/M2 | SYSTOLIC BLOOD PRESSURE: 118 MMHG

## 2024-02-26 DIAGNOSIS — J33.9 NASAL POLYP: ICD-10-CM

## 2024-02-26 DIAGNOSIS — Z01.818 PREOP GENERAL PHYSICAL EXAM: Primary | ICD-10-CM

## 2024-02-26 LAB
ANION GAP SERPL CALCULATED.3IONS-SCNC: 10 MMOL/L (ref 7–15)
BASOPHILS # BLD AUTO: 0.1 10E3/UL (ref 0–0.2)
BASOPHILS NFR BLD AUTO: 1 %
BUN SERPL-MCNC: 11.9 MG/DL (ref 6–20)
CALCIUM SERPL-MCNC: 9.6 MG/DL (ref 8.6–10)
CHLORIDE SERPL-SCNC: 105 MMOL/L (ref 98–107)
CREAT SERPL-MCNC: 0.63 MG/DL (ref 0.51–0.95)
DEPRECATED HCO3 PLAS-SCNC: 23 MMOL/L (ref 22–29)
EGFRCR SERPLBLD CKD-EPI 2021: >90 ML/MIN/1.73M2
EOSINOPHIL # BLD AUTO: 0.2 10E3/UL (ref 0–0.7)
EOSINOPHIL NFR BLD AUTO: 2 %
ERYTHROCYTE [DISTWIDTH] IN BLOOD BY AUTOMATED COUNT: 12.8 % (ref 10–15)
GLUCOSE SERPL-MCNC: 86 MG/DL (ref 70–99)
HCT VFR BLD AUTO: 40.1 % (ref 35–47)
HGB BLD-MCNC: 13.4 G/DL (ref 11.7–15.7)
IMM GRANULOCYTES # BLD: 0 10E3/UL
IMM GRANULOCYTES NFR BLD: 0 %
LYMPHOCYTES # BLD AUTO: 2.7 10E3/UL (ref 0.8–5.3)
LYMPHOCYTES NFR BLD AUTO: 32 %
MCH RBC QN AUTO: 30.7 PG (ref 26.5–33)
MCHC RBC AUTO-ENTMCNC: 33.4 G/DL (ref 31.5–36.5)
MCV RBC AUTO: 92 FL (ref 78–100)
MONOCYTES # BLD AUTO: 0.7 10E3/UL (ref 0–1.3)
MONOCYTES NFR BLD AUTO: 9 %
NEUTROPHILS # BLD AUTO: 4.8 10E3/UL (ref 1.6–8.3)
NEUTROPHILS NFR BLD AUTO: 56 %
NRBC # BLD AUTO: 0 10E3/UL
NRBC BLD AUTO-RTO: 0 /100
PLATELET # BLD AUTO: 290 10E3/UL (ref 150–450)
POTASSIUM SERPL-SCNC: 4.3 MMOL/L (ref 3.4–5.3)
RBC # BLD AUTO: 4.36 10E6/UL (ref 3.8–5.2)
SODIUM SERPL-SCNC: 138 MMOL/L (ref 135–145)
WBC # BLD AUTO: 8.5 10E3/UL (ref 4–11)

## 2024-02-26 PROCEDURE — 99214 OFFICE O/P EST MOD 30 MIN: CPT | Performed by: PHYSICIAN ASSISTANT

## 2024-02-26 PROCEDURE — G0463 HOSPITAL OUTPT CLINIC VISIT: HCPCS | Mod: 25

## 2024-02-26 PROCEDURE — 82374 ASSAY BLOOD CARBON DIOXIDE: CPT | Mod: ZL | Performed by: PHYSICIAN ASSISTANT

## 2024-02-26 PROCEDURE — G0463 HOSPITAL OUTPT CLINIC VISIT: HCPCS

## 2024-02-26 PROCEDURE — 85048 AUTOMATED LEUKOCYTE COUNT: CPT | Mod: ZL | Performed by: PHYSICIAN ASSISTANT

## 2024-02-26 PROCEDURE — 36415 COLL VENOUS BLD VENIPUNCTURE: CPT | Mod: ZL | Performed by: PHYSICIAN ASSISTANT

## 2024-02-26 ASSESSMENT — ANXIETY QUESTIONNAIRES
7. FEELING AFRAID AS IF SOMETHING AWFUL MIGHT HAPPEN: MORE THAN HALF THE DAYS
6. BECOMING EASILY ANNOYED OR IRRITABLE: MORE THAN HALF THE DAYS
GAD7 TOTAL SCORE: 15
3. WORRYING TOO MUCH ABOUT DIFFERENT THINGS: NEARLY EVERY DAY
IF YOU CHECKED OFF ANY PROBLEMS ON THIS QUESTIONNAIRE, HOW DIFFICULT HAVE THESE PROBLEMS MADE IT FOR YOU TO DO YOUR WORK, TAKE CARE OF THINGS AT HOME, OR GET ALONG WITH OTHER PEOPLE: EXTREMELY DIFFICULT
5. BEING SO RESTLESS THAT IT IS HARD TO SIT STILL: SEVERAL DAYS
4. TROUBLE RELAXING: MORE THAN HALF THE DAYS
GAD7 TOTAL SCORE: 15
1. FEELING NERVOUS, ANXIOUS, OR ON EDGE: MORE THAN HALF THE DAYS
8. IF YOU CHECKED OFF ANY PROBLEMS, HOW DIFFICULT HAVE THESE MADE IT FOR YOU TO DO YOUR WORK, TAKE CARE OF THINGS AT HOME, OR GET ALONG WITH OTHER PEOPLE?: EXTREMELY DIFFICULT
GAD7 TOTAL SCORE: 15
2. NOT BEING ABLE TO STOP OR CONTROL WORRYING: NEARLY EVERY DAY
7. FEELING AFRAID AS IF SOMETHING AWFUL MIGHT HAPPEN: MORE THAN HALF THE DAYS

## 2024-02-26 ASSESSMENT — PATIENT HEALTH QUESTIONNAIRE - PHQ9
SUM OF ALL RESPONSES TO PHQ QUESTIONS 1-9: 17
SUM OF ALL RESPONSES TO PHQ QUESTIONS 1-9: 17
10. IF YOU CHECKED OFF ANY PROBLEMS, HOW DIFFICULT HAVE THESE PROBLEMS MADE IT FOR YOU TO DO YOUR WORK, TAKE CARE OF THINGS AT HOME, OR GET ALONG WITH OTHER PEOPLE: EXTREMELY DIFFICULT

## 2024-02-26 ASSESSMENT — PAIN SCALES - GENERAL: PAINLEVEL: NO PAIN (0)

## 2024-02-26 NOTE — NURSING NOTE
"Patient presents to the clinic for pre-op exam.    FOOD SECURITY SCREENING QUESTIONS:    The next two questions are to help us understand your food security.  If you are feeling you need any assistance in this area, we have resources available to support you today.    Hunger Vital Signs:  Within the past 12 months we worried whether our food would run out before we got money to buy more. Never  Within the past 12 months the food we bought just didn't last and we didn't have money to get more. Never    Advance Care Directive on file? no  Advance Care Directive provided to patient? yes      Chief Complaint   Patient presents with    Pre-Op Exam       Initial /68 (BP Location: Right arm, Patient Position: Sitting, Cuff Size: Adult Regular)   Pulse 98   Temp 98.2  F (36.8  C) (Tympanic)   Resp 20   Ht 1.575 m (5' 2\")   Wt 81.2 kg (179 lb)   SpO2 99%   BMI 32.74 kg/m   Estimated body mass index is 32.74 kg/m  as calculated from the following:    Height as of this encounter: 1.575 m (5' 2\").    Weight as of this encounter: 81.2 kg (179 lb).  Medication Reconciliation: complete        Linda Lemus LPN     "

## 2024-02-26 NOTE — PROGRESS NOTES
Preoperative Evaluation  [facility]  [address]  [place] [address]  Phone: [phone]  Fax: [phone]  Primary Provider: Rayna Michaud  Pre-op Performing Provider: LEO PHELPS  Feb 26, 2024       Myrna is a 33 year old, presenting for the following:  Pre-Op Exam        2/26/2024    10:49 AM   Additional Questions   Roomed by ariana zendejas lpn     Surgical Information  Surgery/Procedure: Nasal Polyps  Surgery Location: [facility]  Surgeon: Dr. Lares  Surgery Date: 3-7-24  Time of Surgery: TBD  Where patient plans to recover: At home with family  Fax number for surgical facility: [phone]    Assessment & Plan     The proposed surgical procedure is considered INTERMEDIATE risk.      ICD-10-CM    1. Preop general physical exam  Z01.818 CBC and Differential     Basic Metabolic Panel     CBC and Differential     Basic Metabolic Panel      2. Nasal polyp  J33.9         Vital signs are stable. Patient presented for preoperative evaluation prior to upcoming proposed procedure. Lab work updated today including CBC and BMP. CBC - hemoglobin 13.4, hematocrit 40.1. BMP - GFR >90, creatinine 0.63. EKG not indicated. Reviewed hold times as outlined below. Patient is in agreement and understanding of the above treatment plan. All questions and concerns were addressed and answered to patient's satisfaction. AVS reviewed with patient.   Chronic nasal polyps - Rational for upcoming proposed procedure. Patient aware of risks and benefits. Patient will follow postoperative with surgeon.      - No identified additional risk factors other than previously addressed    Antiplatelet or Anticoagulation Medication Instructions   - Patient is on no antiplatelet or anticoagulation medications.    Additional Medication Instructions  Patient is on no additional chronic medications  -Hold NSAIDs such as ibuprofen, naproxen and Aleve for 7 days prior to surgery.  Tylenol is  rom.  -Hold vitamins and supplements for 10 to 14 days prior to surgery    Recommendation  APPROVAL GIVEN to proceed with proposed procedure, without further diagnostic evaluation.    Subjective     HPI related to upcoming procedure: Myrna presents to the clinic today for preoperative evaluation.  She states she has been struggling with nasal congestion and difficulty breathing for 10+ years.  She has seen multiple providers for this in the past.  She most recently met with ENT who recommended the above procedure to help with breathing.  She declines any recent illness.  No fevers or chills.  She is on no chronic medications.        2/26/2024    10:46 AM   Preop Questions   1. Have you ever had a heart attack or stroke? No   2. Have you ever had surgery on your heart or blood vessels, such as a stent placement, a coronary artery bypass, or surgery on an artery in your head, neck, heart, or legs? No   3. Do you have chest pain with activity? No   4. Do you have a history of  heart failure? No   5. Do you currently have a cold, bronchitis or symptoms of other infection? No   6. Do you have a cough, shortness of breath, or wheezing? No   7. Do you or anyone in your family have previous history of blood clots? No   8. Do you or does anyone in your family have a serious bleeding problem such as prolonged bleeding following surgeries or cuts? No   9. Have you ever had problems with anemia or been told to take iron pills? No   10. Have you had any abnormal blood loss such as black, tarry or bloody stools, or abnormal vaginal bleeding? No   11. Have you ever had a blood transfusion? No   12. Are you willing to have a blood transfusion if it is medically needed before, during, or after your surgery? Yes   13. Have you or any of your relatives ever had problems with anesthesia? No   14. Do you have sleep apnea, excessive snoring or daytime drowsiness? YES - pulmonology   14a. Do you have a CPAP machine? No   15. Do you  have any artifical heart valves or other implanted medical devices like a pacemaker, defibrillator, or continuous glucose monitor? No   16. Do you have artificial joints? No   17. Are you allergic to latex? No   18. Is there any chance that you may be pregnant? No     Health Care Directive  Patient does not have a Health Care Directive or Living Will: Discussed advance care planning with patient; information given to patient to review.    Preoperative Review of    reviewed - controlled substances reflected in medication list.    Status of Chronic Conditions:  See problem list for active medical problems.  Problems all longstanding and stable, except as noted/documented.  See ROS for pertinent symptoms related to these conditions.    Patient Active Problem List    Diagnosis Date Noted    NICHOLE (generalized anxiety disorder) 11/09/2016     Priority: Medium    ACP (advance care planning) 06/23/2016     Priority: Medium     Advance Care Planning 6/23/2016: ACP Review of Chart / Resources Provided:  Reviewed chart for advance care plan.  Myrna Martinez has no plan or code status on file. Discussed available resources and provided with information. Confirmed code status reflects current choices pending further ACP discussions.  Confirmed/documented legally designated decision makers.  Added by Asiya Martinez            Major depressive disorder, recurrent episode, moderate (H) 03/18/2016     Priority: Medium    Herpes genitalis in women 03/18/2016     Priority: Medium      Past Medical History:   Diagnosis Date    Depression     [facility], was [facility] at the time    Headaches, cluster     Herpes genitalis in women     takes acyclovir    Papanicolaou smear of cervix with low grade squamous intraepithelial lesion (LGSIL)     Seasonal allergies      Past Surgical History:   Procedure Laterality Date    APPENDECTOMY  01/01/1996    MAMMOPLASTY REDUCTION  01/01/2011    TOOTH EXTRACTION       Current  Outpatient Medications   Medication Sig Dispense Refill    etonogestrel (IMPLANON/NEXPLANON) 68 MG IMPL 1 each (68 mg) by Subdermal route continuous  0    loratadine (CLARITIN) 10 MG tablet Take 1 tablet (10 mg) by mouth daily (Patient not taking: Reported on 1/12/2024)      modafinil (PROVIGIL) 200 MG tablet Take 0.5-1 tablets (100-200 mg) by mouth every morning .  Start with 0.5 tablets for 1 week, then increase to 1 tablet. (Patient not taking: Reported on 2/26/2024) 30 tablet 3    valACYclovir (VALTREX) 500 MG tablet Take 1 tablet (500 mg) by mouth 2 times daily for 5 days 10 tablet 1       No Known Allergies     Social History     Tobacco Use    Smoking status: Never     Passive exposure: Past    Smokeless tobacco: Never   Substance Use Topics    Alcohol use: Yes     Alcohol/week: 0.0 standard drinks of alcohol     Comment: Alcoholic Drinks/day: 1 drink/month     Family History   Problem Relation Age of Onset    Depression Mother     Hypertension Mother     Migraines Mother         no longer    Depression Father     Bipolar Disorder Maternal Grandmother     Depression Maternal Grandmother     Hypertension Maternal Grandmother     Bipolar Disorder Paternal Grandmother         suspected    Migraines Brother     Breast Cancer Maternal Aunt     Obsessive Compulsive Disorder Maternal Aunt     Breast Cancer Other     Alcoholism Mother     Bipolar Disorder Mother     Post-Traumatic Stress Disorder (PTSD) Mother     Attention Deficit Disorder Son     Alcoholism Maternal Grandmother     Substance Abuse Maternal Grandmother     Depression Paternal Grandmother      History   Drug Use Unknown     Comment: marijuana - quit a few weeks ago 8/8/18         Review of Systems    Review of Systems  Constitutional, neuro, ENT, endocrine, pulmonary, cardiac, gastrointestinal, genitourinary, musculoskeletal, integument and psychiatric systems are negative, except as otherwise noted.    Objective    /68 (BP Location: Right arm,  "Patient Position: Sitting, Cuff Size: Adult Regular)   Pulse 98   Temp 98.2  F (36.8  C) (Tympanic)   Resp 20   Ht 1.575 m (5' 2\")   Wt 81.2 kg (179 lb)   SpO2 99%   BMI 32.74 kg/m     Estimated body mass index is 32.74 kg/m  as calculated from the following:    Height as of this encounter: 1.575 m (5' 2\").    Weight as of this encounter: 81.2 kg (179 lb).  Physical Exam  GENERAL: alert and no distress  EYES: Eyes grossly normal to inspection, PERRL and conjunctivae and sclerae normal  HENT: ear canals and TM's normal, nose and mouth without ulcers or lesions  NECK: no adenopathy, no asymmetry, masses, or scars  RESP: lungs clear to auscultation - no rales, rhonchi or wheezes  CV: regular rate and rhythm, normal S1 S2, no S3 or S4, no murmur, click or rub, no peripheral edema  ABDOMEN: soft, nontender, no hepatosplenomegaly, no masses and bowel sounds normal  MS: no gross musculoskeletal defects noted, no edema  SKIN: no suspicious lesions or rashes  PSYCH: mentation appears normal, affect normal/bright  LYMPH: normal ant/post cervical, supraclavicular nodes    Recent Labs   Lab Test 11/14/23  1027 05/10/23  1442   HGB 12.9 12.4    260   A1C 5.2  --         Diagnostics  Recent Results (from the past 24 hour(s))   CBC with platelets and differential    Collection Time: 02/26/24 11:21 AM   Result Value Ref Range    WBC Count 8.5 4.0 - 11.0 10e3/uL    RBC Count 4.36 3.80 - 5.20 10e6/uL    Hemoglobin 13.4 11.7 - 15.7 g/dL    Hematocrit 40.1 35.0 - 47.0 %    MCV 92 78 - 100 fL    MCH 30.7 26.5 - 33.0 pg    MCHC 33.4 31.5 - 36.5 g/dL    RDW 12.8 10.0 - 15.0 %    Platelet Count 290 150 - 450 10e3/uL    % Neutrophils 56 %    % Lymphocytes 32 %    % Monocytes 9 %    % Eosinophils 2 %    % Basophils 1 %    % Immature Granulocytes 0 %    NRBCs per 100 WBC 0 <1 /100    Absolute Neutrophils 4.8 1.6 - 8.3 10e3/uL    Absolute Lymphocytes 2.7 0.8 - 5.3 10e3/uL    Absolute Monocytes 0.7 0.0 - 1.3 10e3/uL    Absolute " Eosinophils 0.2 0.0 - 0.7 10e3/uL    Absolute Basophils 0.1 0.0 - 0.2 10e3/uL    Absolute Immature Granulocytes 0.0 <=0.4 10e3/uL    Absolute NRBCs 0.0 10e3/uL      No EKG required, no history of coronary heart disease, significant arrhythmia, peripheral arterial disease or other structural heart disease.    Revised Cardiac Risk Index (RCRI)  The patient has the following serious cardiovascular risks for perioperative complications:   - No serious cardiac risks = 0 points     RCRI Interpretation: 0 points: Class I (very low risk - 0.4% complication rate)    Signed Electronically by: Katalina Hooker PA-C  Copy of this evaluation report is provided to requesting physician.    Answers submitted by the patient for this visit:  Patient Health Questionnaire (Submitted on 2/26/2024)  If you checked off any problems, how difficult have these problems made it for you to do your work, take care of things at home, or get along with other people?: Extremely difficult  PHQ9 TOTAL SCORE: 17  NICHOLE-7 (Submitted on 2/26/2024)  NICHOLE 7 TOTAL SCORE: 15

## 2024-02-26 NOTE — PATIENT INSTRUCTIONS
How to Take Your Medication Before Surgery  - Hold NSAIDs (Ibuprofen, Naproxen, Aleve) for 7 days prior to surgery. Hold vitamins and supplements for 10-14 days prior to surgery  - Lab work on average will return in 1-2 hours, unless listed otherwise below (your provider will typically review & provide you with results and recommendations within 1 day):  - CBC - blood counts  - CMP/BMP - kidney and electrolyte lab. CMP adds in liver function  Preparing for Your Surgery  Getting started  A nurse will call you to review your health history and instructions. They will give you an arrival time based on your scheduled surgery time. Please be ready to share:  Your doctor's clinic name and phone number  Your medical, surgical, and anesthesia history  A list of allergies and sensitivities  A list of medicines, including herbal treatments and over-the-counter drugs  Whether the patient has a legal guardian (ask how to send us the papers in advance)  Please tell us if you're pregnant--or if there's any chance you might be pregnant. Some surgeries may injure a fetus (unborn baby), so they require a pregnancy test. Surgeries that are safe for a fetus don't always need a test, and you can choose whether to have one.   If you have a child who's having surgery, please ask for a copy of Preparing for Your Child's Surgery.    Preparing for surgery  Within 10 to 30 days of surgery: Have a pre-op exam (sometimes called an H&P, or History and Physical). This can be done at a clinic or pre-operative center.  If you're having a , you may not need this exam. Talk to your care team.  At your pre-op exam, talk to your care team about all medicines you take. If you need to stop any medicines before surgery, ask when to start taking them again.  We do this for your safety. Many medicines can make you bleed too much during surgery. Some change how well surgery (anesthesia) drugs work.  Call your insurance company to let them know  you're having surgery. (If you don't have insurance, call [phone].)  Call your clinic if there's any change in your health. This includes signs of a cold or flu (sore throat, runny nose, cough, rash, fever). It also includes a scrape or scratch near the surgery site.  If you have questions on the day of surgery, call your hospital or surgery center.  Eating and drinking guidelines  For your safety: Unless your surgeon tells you otherwise, follow the guidelines below.  Eat and drink as usual until 8 hours before you arrive for surgery. After that, no food or milk.  Drink clear liquids until 2 hours before you arrive. These are liquids you can see through, like water, Gatorade, and Propel Water. They also include plain black coffee and tea (no cream or milk), candy, and breath mints. You can spit out gum when you arrive.  If you drink alcohol: Stop drinking it the night before surgery.  If your care team tells you to take medicine on the morning of surgery, it's okay to take it with a sip of water.  Preventing infection  Shower or bathe the night before and morning of your surgery. Follow the instructions your clinic gave you. (If no instructions, use regular soap.)  Don't shave or clip hair near your surgery site. We'll remove the hair if needed.  Don't smoke or vape the morning of surgery. You may chew nicotine gum up to 2 hours before surgery. A nicotine patch is okay.  Note: Some surgeries require you to completely quit smoking and nicotine. Check with your surgeon.  Your care team will make every effort to keep you safe from infection. We will:  Clean our hands often with soap and water (or an alcohol-based hand rub).  Clean the skin at your surgery site with a special soap that kills germs.  Give you a special gown to keep you warm. (Cold raises the risk of infection.)  Wear special hair covers, masks, gowns and gloves during surgery.  Give antibiotic medicine, if prescribed. Not all surgeries need  antibiotics.  What to bring on the day of surgery  Photo ID and insurance card  Copy of your health care directive, if you have one  Glasses and hearing aids (bring cases)  You can't wear contacts during surgery  Inhaler and eye drops, if you use them (tell us about these when you arrive)  CPAP machine or breathing device, if you use them  A few personal items, if spending the night  If you have . . .  A pacemaker, ICD (cardiac defibrillator) or other implant: Bring the ID card.  An implanted stimulator: Bring the remote control.  A legal guardian: Bring a copy of the certified (court-stamped) guardianship papers.  Please remove any jewelry, including body piercings. Leave jewelry and other valuables at home.  If you're going home the day of surgery  You must have a responsible adult drive you home. They should stay with you overnight as well.  If you don't have someone to stay with you, and you aren't safe to go home alone, we may keep you overnight. Insurance often won't pay for this.  After surgery  If it's hard to control your pain or you need more pain medicine, please call your surgeon's office.  Questions?   If you have any questions for your care team, list them here: _________________________________________________________________________________________________________________________________________________________________________ ____________________________________ ____________________________________ ____________________________________  For informational purposes only. Not to replace the advice of your health care provider. Copyright   2003, 2019 [facility]. All rights reserved. Clinically reviewed by Evelyne Pak MD. [employer] 388777 - REV 12/22.

## 2024-03-01 ENCOUNTER — OFFICE VISIT (OUTPATIENT)
Dept: FAMILY MEDICINE | Facility: OTHER | Age: 34
End: 2024-03-01
Payer: COMMERCIAL

## 2024-03-01 VITALS
RESPIRATION RATE: 16 BRPM | SYSTOLIC BLOOD PRESSURE: 120 MMHG | WEIGHT: 179.7 LBS | HEART RATE: 97 BPM | OXYGEN SATURATION: 98 % | HEIGHT: 62 IN | DIASTOLIC BLOOD PRESSURE: 72 MMHG | TEMPERATURE: 98.1 F | BODY MASS INDEX: 33.07 KG/M2

## 2024-03-01 DIAGNOSIS — J02.9 SORE THROAT: ICD-10-CM

## 2024-03-01 DIAGNOSIS — J02.9 VIRAL PHARYNGITIS: Primary | ICD-10-CM

## 2024-03-01 LAB — GROUP A STREP BY PCR: NOT DETECTED

## 2024-03-01 PROCEDURE — G0463 HOSPITAL OUTPT CLINIC VISIT: HCPCS

## 2024-03-01 PROCEDURE — 99213 OFFICE O/P EST LOW 20 MIN: CPT | Performed by: NURSE PRACTITIONER

## 2024-03-01 PROCEDURE — 87651 STREP A DNA AMP PROBE: CPT | Mod: ZL | Performed by: NURSE PRACTITIONER

## 2024-03-01 ASSESSMENT — PAIN SCALES - GENERAL: PAINLEVEL: MILD PAIN (2)

## 2024-03-01 NOTE — LETTER
March 1, 2024      Myrna Martinez  [address] [facility]  [address]  [place] [address]        To Whom It May Concern:    Myrna Martinez  was seen on 3/1/24 for sore throat.  Please excuse her from work on 3/1/24 due to illness.        Sincerely,        Valeri Ackerman, NP

## 2024-03-01 NOTE — PROGRESS NOTES
ASSESSMENT/PLAN:     I have reviewed the nursing notes.  I have reviewed the findings, diagnosis, plan and need for follow up with the patient.        1. Sore throat    - Group A Streptococcus PCR Throat Swab    2. Viral pharyngitis    Negative Strep PCR test    Discussed with patient that symptoms and exam are consistent with viral illness.    No clinical indications for antibiotic treatment at this time.    Symptomatic treatment - Encouraged fluids, salt water gargles, honey, elevation, humidifier, lozenges, tea, soup, smoothies, popsicles, etc     May use over-the-counter Tylenol or ibuprofen PRN    Discussed warning signs/symptoms indicative of need to f/u  Follow up if symptoms persist or worsen or concerns      I explained my diagnostic considerations and recommendations to the patient, who voiced understanding and agreement with the treatment plan. All questions were answered. We discussed potential side effects of any prescribed or recommended therapies, as well as expectations for response to treatments.    Vlaeri Ackerman NP  [facility]      SUBJECTIVE:   Myrna Martinez is a 33 year old female who presents to clinic today for the following health issues:  Sore throat - strep      HPI  Started with scratchy throat a few days ago and progressed to feeling swollen and mildly sore with noted redness and white spots.  States she is scheduled for an upcoming surgery 3/7/24 (nasal polyp) and wants to rule out strep.  Exposure to strep and influenza from her child last week.    No fevers.  Chronic nasal congestion.  Mild body aches.  No headaches.    Mild cough.  No congestion, tightness, heaviness or shortness of breath.  No nausea or vomiting.  Appetite fair.  Energy decreased.  No OTC medications          Past Medical History:   Diagnosis Date    Depression     [facility], was [facility] at the time    Headaches, cluster     Herpes genitalis in women     takes acyclovir     "Papanicolaou smear of cervix with low grade squamous intraepithelial lesion (LGSIL)     Seasonal allergies      Past Surgical History:   Procedure Laterality Date    APPENDECTOMY  01/01/1996    MAMMOPLASTY REDUCTION  01/01/2011    TOOTH EXTRACTION       Social History     Tobacco Use    Smoking status: Never     Passive exposure: Past    Smokeless tobacco: Never   Substance Use Topics    Alcohol use: Yes     Alcohol/week: 0.0 standard drinks of alcohol     Comment: Alcoholic Drinks/day: 1 drink/month     Current Outpatient Medications   Medication Sig Dispense Refill    etonogestrel (IMPLANON/NEXPLANON) 68 MG IMPL 1 each (68 mg) by Subdermal route continuous  0    loratadine (CLARITIN) 10 MG tablet Take 1 tablet (10 mg) by mouth daily (Patient not taking: Reported on 1/12/2024)      modafinil (PROVIGIL) 200 MG tablet Take 0.5-1 tablets (100-200 mg) by mouth every morning .  Start with 0.5 tablets for 1 week, then increase to 1 tablet. (Patient not taking: Reported on 2/26/2024) 30 tablet 3    valACYclovir (VALTREX) 500 MG tablet Take 1 tablet (500 mg) by mouth 2 times daily for 5 days 10 tablet 1     No Known Allergies      Past medical history, past surgical history, current medications and allergies reviewed and accurate to the best of my knowledge.        OBJECTIVE:     /72   Pulse 97   Temp 98.1  F (36.7  C) (Tympanic)   Resp 16   Ht 1.575 m (5' 2\")   Wt 81.5 kg (179 lb 11.2 oz)   SpO2 98%   BMI 32.87 kg/m    Body mass index is 32.87 kg/m .        Physical Exam  General Appearance: Well appearing adult female, non-ill appearance, appropriate appearance for age. No acute distress  Ears: Left TM intact, no erythema, no effusion, no bulging, no purulence.  Right TM intact, no erythema, no effusion, no bulging, no purulence.  Left auditory canal clear without drainage or bleeding.  Right auditory canal clear without drainage or bleeding.  Normal external ears, non tender.  Eyes: conjunctivae normal " without erythema or irritation, corneas clear, no drainage or crusting, no eyelid swelling, pupils equal   Orophayrnx: moist mucous membranes, pharynx with mild erythema, tonsils without hypertrophy, tonsils with mild erythema, no tonsillar exudates, no oral lesions, no palate petechiae, no post nasal drip seen, no trismus, voice clear.    Nose:  chronic congestion   Neck: supple without adenopathy  Respiratory: normal chest wall and respirations.  Normal effort.  Clear to auscultation bilaterally, no wheezing, crackles or rhonchi.  No increased work of breathing.  No cough appreciated.  Cardiac: RRR with no murmurs  Musculoskeletal:  Equal movement of bilateral upper extremities.  Equal movement of bilateral lower extremities.  Normal gait.    Psychological: normal affect, alert, oriented, and pleasant.       Labs:  Results for orders placed or performed in visit on 03/01/24   Group A Streptococcus PCR Throat Swab     Status: Normal    Specimen: Throat; Swab   Result Value Ref Range    Group A strep by PCR Not Detected Not Detected    Narrative    The Xpert Xpress Strep A test, performed on the [employer]  Instrument Systems, is a rapid, qualitative in vitro diagnostic test for the detection of Streptococcus pyogenes (Group A ß-hemolytic Streptococcus, Strep A) in throat swab specimens from patients with signs and symptoms of pharyngitis. The Xpert Xpress Strep A test can be used as an aid in the diagnosis of Group A Streptococcal pharyngitis. The assay is not intended to monitor treatment for Group A Streptococcus infections. The Xpert Xpress Strep A test utilizes an automated real-time polymerase chain reaction (PCR) to detect Streptococcus pyogenes DNA.

## 2024-03-01 NOTE — NURSING NOTE
"Chief Complaint   Patient presents with    Pharyngitis     Patient here with friend's kids for sore throat. She has upcomig surgery and would like to check for that.       Initial /72   Pulse 97   Temp 98.1  F (36.7  C) (Tympanic)   Resp 16   Ht 1.575 m (5' 2\")   Wt 81.5 kg (179 lb 11.2 oz)   SpO2 98%   BMI 32.87 kg/m   Estimated body mass index is 32.87 kg/m  as calculated from the following:    Height as of this encounter: 1.575 m (5' 2\").    Weight as of this encounter: 81.5 kg (179 lb 11.2 oz).  Medication Review: complete    The next two questions are to help us understand your food security.  If you are feeling you need any assistance in this area, we have resources available to support you today.          1/12/2024   SDOH- Food Insecurity   Within the past 12 months, did you worry that your food would run out before you got money to buy more? N   Within the past 12 months, did the food you bought just not last and you didn t have money to get more? N         Health Care Directive:  Patient does not have a Health Care Directive or Living Will: Discussed advance care planning with patient; however, patient declined at this time.    Nalini Cr LPN      "

## 2024-03-07 ENCOUNTER — TRANSFERRED RECORDS (OUTPATIENT)
Dept: HEALTH INFORMATION MANAGEMENT | Facility: OTHER | Age: 34
End: 2024-03-07
Payer: COMMERCIAL

## 2024-03-19 ENCOUNTER — OFFICE VISIT (OUTPATIENT)
Dept: OTOLARYNGOLOGY | Facility: OTHER | Age: 34
End: 2024-03-19
Attending: OTOLARYNGOLOGY
Payer: COMMERCIAL

## 2024-03-19 DIAGNOSIS — Z09 POSTOP CHECK: Primary | ICD-10-CM

## 2024-03-19 PROCEDURE — G0463 HOSPITAL OUTPT CLINIC VISIT: HCPCS

## 2024-03-21 NOTE — PROGRESS NOTES
document embedded image  Patient Name: Myrna Martinez   Address: [address] [facility] [address]   YOB: 1990   [place] [address]   MR Number: [id number]   Phone: [phone]  PCP: Rayna Freeman           Appointment Date: 03/19/24  Visit Provider: George Lares MD    cc: Rayna Freeman; ~    ENT Progress Note    Intake  Visit Reasons: PO sinus    HPI  History of Present Illness  Chief complaint:  Postop check     History  The patient is a 33-year-old female who is recently status post endoscopic sinus surgery with decompression of her ethmoid sinuses and opening of her maxillary sinuses.  She notices significant reduction in pressure in her midface.  She feels her airway is improved in her sense of smell is improved.  All-in-all she is quite happy with her results in the early postoperative period.    Exam   Nasal-there is minimal lingering crusting at this time.  Both middle meati are widely patent without evidence synechiae.  There is no purulence drainage.    Allergies    seasonal allergies Adverse Reaction (Verified 03/07/24 10:23)  Unknown    PFSH  PFSH:     Past Medical History: (Updated 03/19/24 @ 09:07 by George Lares MD)    Herpes genitalis in women  Headaches, cluster  Major depressive disorder  NICHOLE (generalized anxiety disorder)      Past Surgical History: (Updated 03/04/24 @ 11:28 by Pushpa Blanton RN)    History of tooth extraction  History of reduction mammoplasty  History of appendectomy      Social History:  Smoking Status:  Never smoker     A&P  Assessment & Plan  (1) Postop check:         Status: Acute        Code(s):  Z09 - Encounter for follow-up examination after completed treatment for conditions other than malignant neoplasm  She was encouraged to continue daily saline irrigations indefinitely.  She is welcome to follow up if she has worsening sinus symptoms in the future.                George Lares MD    Filed: 03/20/24 [id number]      <Electronically signed by George Lares MD> 03/20/24 [id number]

## 2024-03-25 ENCOUNTER — OFFICE VISIT (OUTPATIENT)
Dept: FAMILY MEDICINE | Facility: OTHER | Age: 34
End: 2024-03-25
Attending: NURSE PRACTITIONER
Payer: COMMERCIAL

## 2024-03-25 VITALS
WEIGHT: 177.4 LBS | RESPIRATION RATE: 16 BRPM | OXYGEN SATURATION: 98 % | BODY MASS INDEX: 32.65 KG/M2 | SYSTOLIC BLOOD PRESSURE: 110 MMHG | TEMPERATURE: 98.2 F | HEIGHT: 62 IN | DIASTOLIC BLOOD PRESSURE: 70 MMHG | HEART RATE: 92 BPM

## 2024-03-25 DIAGNOSIS — F90.1 ATTENTION DEFICIT HYPERACTIVITY DISORDER (ADHD), PREDOMINANTLY HYPERACTIVE TYPE: Primary | ICD-10-CM

## 2024-03-25 PROCEDURE — G0463 HOSPITAL OUTPT CLINIC VISIT: HCPCS

## 2024-03-25 PROCEDURE — 99214 OFFICE O/P EST MOD 30 MIN: CPT | Performed by: NURSE PRACTITIONER

## 2024-03-25 RX ORDER — ATOMOXETINE 40 MG/1
40 CAPSULE ORAL DAILY
Qty: 30 CAPSULE | Refills: 0 | Status: SHIPPED | OUTPATIENT
Start: 2024-03-25

## 2024-03-25 ASSESSMENT — PATIENT HEALTH QUESTIONNAIRE - PHQ9
SUM OF ALL RESPONSES TO PHQ QUESTIONS 1-9: 12
SUM OF ALL RESPONSES TO PHQ QUESTIONS 1-9: 12
10. IF YOU CHECKED OFF ANY PROBLEMS, HOW DIFFICULT HAVE THESE PROBLEMS MADE IT FOR YOU TO DO YOUR WORK, TAKE CARE OF THINGS AT HOME, OR GET ALONG WITH OTHER PEOPLE: EXTREMELY DIFFICULT

## 2024-03-25 ASSESSMENT — ANXIETY QUESTIONNAIRES
8. IF YOU CHECKED OFF ANY PROBLEMS, HOW DIFFICULT HAVE THESE MADE IT FOR YOU TO DO YOUR WORK, TAKE CARE OF THINGS AT HOME, OR GET ALONG WITH OTHER PEOPLE?: EXTREMELY DIFFICULT
6. BECOMING EASILY ANNOYED OR IRRITABLE: SEVERAL DAYS
2. NOT BEING ABLE TO STOP OR CONTROL WORRYING: SEVERAL DAYS
1. FEELING NERVOUS, ANXIOUS, OR ON EDGE: SEVERAL DAYS
IF YOU CHECKED OFF ANY PROBLEMS ON THIS QUESTIONNAIRE, HOW DIFFICULT HAVE THESE PROBLEMS MADE IT FOR YOU TO DO YOUR WORK, TAKE CARE OF THINGS AT HOME, OR GET ALONG WITH OTHER PEOPLE: EXTREMELY DIFFICULT
GAD7 TOTAL SCORE: 9
GAD7 TOTAL SCORE: 9
7. FEELING AFRAID AS IF SOMETHING AWFUL MIGHT HAPPEN: NOT AT ALL
5. BEING SO RESTLESS THAT IT IS HARD TO SIT STILL: SEVERAL DAYS
7. FEELING AFRAID AS IF SOMETHING AWFUL MIGHT HAPPEN: NOT AT ALL
GAD7 TOTAL SCORE: 9
3. WORRYING TOO MUCH ABOUT DIFFERENT THINGS: NEARLY EVERY DAY
4. TROUBLE RELAXING: MORE THAN HALF THE DAYS

## 2024-03-25 ASSESSMENT — PAIN SCALES - GENERAL: PAINLEVEL: NO PAIN (0)

## 2024-03-25 NOTE — NURSING NOTE
Patient presents today for new ADHD diagnosis. Patient inquiring about medication options.      Medication Reconciliation Complete    Maritza Bhandari LPN  3/25/2024 7:48 AM

## 2024-04-18 ENCOUNTER — OFFICE VISIT (OUTPATIENT)
Dept: FAMILY MEDICINE | Facility: OTHER | Age: 34
End: 2024-04-18
Attending: NURSE PRACTITIONER
Payer: COMMERCIAL

## 2024-04-18 VITALS
HEART RATE: 78 BPM | DIASTOLIC BLOOD PRESSURE: 78 MMHG | OXYGEN SATURATION: 97 % | SYSTOLIC BLOOD PRESSURE: 124 MMHG | TEMPERATURE: 97.6 F | WEIGHT: 175.6 LBS | RESPIRATION RATE: 17 BRPM | BODY MASS INDEX: 32.31 KG/M2 | HEIGHT: 62 IN

## 2024-04-18 DIAGNOSIS — T16.2XXA FOREIGN BODY OF LEFT EAR, INITIAL ENCOUNTER: ICD-10-CM

## 2024-04-18 DIAGNOSIS — R19.7 DIARRHEA, UNSPECIFIED TYPE: ICD-10-CM

## 2024-04-18 DIAGNOSIS — A60.00 GENITAL HERPES SIMPLEX, UNSPECIFIED SITE: ICD-10-CM

## 2024-04-18 DIAGNOSIS — Z11.3 SCREEN FOR STD (SEXUALLY TRANSMITTED DISEASE): ICD-10-CM

## 2024-04-18 DIAGNOSIS — Z00.00 ROUTINE GENERAL MEDICAL EXAMINATION AT A HEALTH CARE FACILITY: Primary | ICD-10-CM

## 2024-04-18 DIAGNOSIS — Z12.4 CERVICAL CANCER SCREENING: ICD-10-CM

## 2024-04-18 LAB
C TRACH DNA SPEC QL PROBE+SIG AMP: NEGATIVE
N GONORRHOEA DNA SPEC QL NAA+PROBE: NEGATIVE

## 2024-04-18 PROCEDURE — 87624 HPV HI-RISK TYP POOLED RSLT: CPT | Mod: ZL | Performed by: NURSE PRACTITIONER

## 2024-04-18 PROCEDURE — 99395 PREV VISIT EST AGE 18-39: CPT | Performed by: NURSE PRACTITIONER

## 2024-04-18 PROCEDURE — G0123 SCREEN CERV/VAG THIN LAYER: HCPCS | Performed by: NURSE PRACTITIONER

## 2024-04-18 PROCEDURE — 87491 CHLMYD TRACH DNA AMP PROBE: CPT | Mod: ZL | Performed by: NURSE PRACTITIONER

## 2024-04-18 RX ORDER — DEXTROAMPHETAMINE SACCHARATE, AMPHETAMINE ASPARTATE MONOHYDRATE, DEXTROAMPHETAMINE SULFATE AND AMPHETAMINE SULFATE 2.5; 2.5; 2.5; 2.5 MG/1; MG/1; MG/1; MG/1
CAPSULE, EXTENDED RELEASE ORAL
COMMUNITY
Start: 2024-04-03

## 2024-04-18 RX ORDER — VALACYCLOVIR HYDROCHLORIDE 500 MG/1
500 TABLET, FILM COATED ORAL 2 TIMES DAILY
Qty: 10 TABLET | Refills: 2 | Status: SHIPPED | OUTPATIENT
Start: 2024-04-18

## 2024-04-18 SDOH — HEALTH STABILITY: PHYSICAL HEALTH: ON AVERAGE, HOW MANY DAYS PER WEEK DO YOU ENGAGE IN MODERATE TO STRENUOUS EXERCISE (LIKE A BRISK WALK)?: 2 DAYS

## 2024-04-18 ASSESSMENT — ANXIETY QUESTIONNAIRES
IF YOU CHECKED OFF ANY PROBLEMS ON THIS QUESTIONNAIRE, HOW DIFFICULT HAVE THESE PROBLEMS MADE IT FOR YOU TO DO YOUR WORK, TAKE CARE OF THINGS AT HOME, OR GET ALONG WITH OTHER PEOPLE: VERY DIFFICULT
GAD7 TOTAL SCORE: 9
3. WORRYING TOO MUCH ABOUT DIFFERENT THINGS: MORE THAN HALF THE DAYS
1. FEELING NERVOUS, ANXIOUS, OR ON EDGE: SEVERAL DAYS
2. NOT BEING ABLE TO STOP OR CONTROL WORRYING: MORE THAN HALF THE DAYS
GAD7 TOTAL SCORE: 9
7. FEELING AFRAID AS IF SOMETHING AWFUL MIGHT HAPPEN: SEVERAL DAYS
5. BEING SO RESTLESS THAT IT IS HARD TO SIT STILL: SEVERAL DAYS
8. IF YOU CHECKED OFF ANY PROBLEMS, HOW DIFFICULT HAVE THESE MADE IT FOR YOU TO DO YOUR WORK, TAKE CARE OF THINGS AT HOME, OR GET ALONG WITH OTHER PEOPLE?: VERY DIFFICULT
6. BECOMING EASILY ANNOYED OR IRRITABLE: SEVERAL DAYS
7. FEELING AFRAID AS IF SOMETHING AWFUL MIGHT HAPPEN: SEVERAL DAYS
GAD7 TOTAL SCORE: 9
4. TROUBLE RELAXING: SEVERAL DAYS

## 2024-04-18 ASSESSMENT — SOCIAL DETERMINANTS OF HEALTH (SDOH): HOW OFTEN DO YOU GET TOGETHER WITH FRIENDS OR RELATIVES?: TWICE A WEEK

## 2024-04-18 ASSESSMENT — PATIENT HEALTH QUESTIONNAIRE - PHQ9
SUM OF ALL RESPONSES TO PHQ QUESTIONS 1-9: 9
SUM OF ALL RESPONSES TO PHQ QUESTIONS 1-9: 9
10. IF YOU CHECKED OFF ANY PROBLEMS, HOW DIFFICULT HAVE THESE PROBLEMS MADE IT FOR YOU TO DO YOUR WORK, TAKE CARE OF THINGS AT HOME, OR GET ALONG WITH OTHER PEOPLE: VERY DIFFICULT

## 2024-04-18 ASSESSMENT — PAIN SCALES - GENERAL: PAINLEVEL: NO PAIN (0)

## 2024-04-18 NOTE — PATIENT INSTRUCTIONS
Preventive Care Advice   This is general advice given by our system to help you stay healthy. However, your care team may have specific advice just for you. Please talk to your care team about your preventive care needs.  Nutrition  Eat 5 or more servings of fruits and vegetables each day.  Try wheat bread, brown rice and whole grain pasta (instead of white bread, rice, and pasta).  Get enough calcium and vitamin D. Check the label on foods and aim for 100% of the RDA (recommended daily allowance).  Lifestyle  Exercise at least 150 minutes each week   (30 minutes a day, 5 days a week).  Do muscle strengthening activities 2 days a week. These help control your weight and prevent disease.  No smoking.  Wear sunscreen to prevent skin cancer.  Have a dental exam and cleaning every 6 months.  Yearly exams  See your health care team every year to talk about:  Any changes in your health.  Any medicines your care team has prescribed.  Preventive care, family planning, and ways to prevent chronic diseases.  Shots (vaccines)   HPV shots (up to age 26), if you've never had them before.  Hepatitis B shots (up to age 59), if you've never had them before.  COVID-19 shot: Get this shot when it's due.  Flu shot: Get a flu shot every year.  Tetanus shot: Get a tetanus shot every 10 years.  Pneumococcal, hepatitis A, and RSV shots: Ask your care team if you need these based on your risk.  Shingles shot (for age 50 and up).  General health tests  Diabetes screening:  Starting at age 35, Get screened for diabetes at least every 3 years.  If you are younger than age 35, ask your care team if you should be screened for diabetes.  Cholesterol test: At age 39, start having a cholesterol test every 5 years, or more often if advised.  Bone density scan (DEXA): At age 50, ask your care team if you should have this scan for osteoporosis (brittle bones).  Hepatitis C: Get tested at least once in your life.  STIs (sexually transmitted  infections)  Before age 24: Ask your care team if you should be screened for STIs.  After age 24: Get screened for STIs if you're at risk. You are at risk for STIs (including HIV) if:  You are sexually active with more than one person.  You don't use condoms every time.  You or a partner was diagnosed with a sexually transmitted infection.  If you are at risk for HIV, ask about PrEP medicine to prevent HIV.  Get tested for HIV at least once in your life, whether you are at risk for HIV or not.  Cancer screening tests  Cervical cancer screening: If you have a cervix, begin getting regular cervical cancer screening tests at age 21. Most people who have regular screenings with normal results can stop after age 65. Talk about this with your provider.  Breast cancer scan (mammogram): If you've ever had breasts, begin having regular mammograms starting at age 40. This is a scan to check for breast cancer.  Colon cancer screening: It is important to start screening for colon cancer at age 45.  Have a colonoscopy test every 10 years (or more often if you're at risk) Or, ask your provider about stool tests like a FIT test every year or Cologuard test every 3 years.  To learn more about your testing options, visit: https://www.[employer]ep[employer]/712041.pdf.  For help making a decision, visit: https://bit.ly/iy78056.  Prostate cancer screening test: If you have a prostate and are age 55 to 69, ask your provider if you would benefit from a yearly prostate cancer screening test.  Lung cancer screening: If you are a current or former smoker age 50 to 80, ask your care team if ongoing lung cancer screenings are right for you.  For informational purposes only. Not to replace the advice of your health care provider. Copyright   2023 [employer]. All rights reserved. Clinically reviewed by the [facility]. SilMach 122383 - REV 01/24.    Learning About Stress  What is stress?     Stress is your  body's response to a hard situation. Your body can have a physical, emotional, or mental response. Stress is a fact of life for most people, and it affects everyone differently. What causes stress for you may not be stressful for someone else.  A lot of things can cause stress. You may feel stress when you go on a job interview, take a test, or run a race. This kind of short-term stress is normal and even useful. It can help you if you need to work hard or react quickly. For example, stress can help you finish an important job on time.  Long-term stress is caused by ongoing stressful situations or events. Examples of long-term stress include long-term health problems, ongoing problems at work, or conflicts in your family. Long-term stress can harm your health.  How does stress affect your health?  When you are stressed, your body responds as though you are in danger. It makes hormones that speed up your heart, make you breathe faster, and give you a burst of energy. This is called the fight-or-flight stress response. If the stress is over quickly, your body goes back to normal and no harm is done.  But if stress happens too often or lasts too long, it can have bad effects. Long-term stress can make you more likely to get sick, and it can make symptoms of some diseases worse. If you tense up when you are stressed, you may develop neck, shoulder, or low back pain. Stress is linked to high blood pressure and heart disease.  Stress also harms your emotional health. It can make you nguyen, tense, or depressed. Your relationships may suffer, and you may not do well at work or school.  What can you do to manage stress?  You can try these things to help manage stress:   Do something active. Exercise or activity can help reduce stress. Walking is a great way to get started. Even everyday activities such as housecleaning or yard work can help.  Try yoga or abdulaziz chi. These techniques combine exercise and meditation. You may need  some training at first to learn them.  Do something you enjoy. For example, listen to music or go to a movie. Practice your hobby or do volunteer work.  Meditate. This can help you relax, because you are not worrying about what happened before or what may happen in the future.  Do guided imagery. Imagine yourself in any setting that helps you feel calm. You can use online videos, books, or a teacher to guide you.  Do breathing exercises. For example:  From a standing position, bend forward from the waist with your knees slightly bent. Let your arms dangle close to the floor.  Breathe in slowly and deeply as you return to a standing position. Roll up slowly and lift your head last.  Hold your breath for just a few seconds in the standing position.  Breathe out slowly and bend forward from the waist.  Let your feelings out. Talk, laugh, cry, and express anger when you need to. Talking with supportive friends or family, a counselor, or a arvind leader about your feelings is a healthy way to relieve stress. Avoid discussing your feelings with people who make you feel worse.  Write. It may help to write about things that are bothering you. This helps you find out how much stress you feel and what is causing it. When you know this, you can find better ways to cope.  What can you do to prevent stress?  You might try some of these things to help prevent stress:  Manage your time. This helps you find time to do the things you want and need to do.  Get enough sleep. Your body recovers from the stresses of the day while you are sleeping.  Get support. Your family, friends, and community can make a difference in how you experience stress.  Limit your news feed. Avoid or limit time on social media or news that may make you feel stressed.  Do something active. Exercise or activity can help reduce stress. Walking is a great way to get started.  Where can you learn more?  Go to https://www.healthwise.net/patiented  Enter N032 in the  "search box to learn more about \"Learning About Stress.\"  Current as of: October 24, 2023               Content Version: 14.0    7253-5896 [employer].   Care instructions adapted under license by your healthcare professional. If you have questions about a medical condition or this instruction, always ask your healthcare professional. [employer] disclaims any warranty or liability for your use of this information.      Learning About Depression Screening  What is depression screening?  Depression screening is a way to see if you have depression symptoms. It may be done by a doctor or counselor. It's often part of a routine checkup. That's because your mental health is just as important as your physical health.  Depression is a mental health condition that affects how you feel, think, and act. You may:  Have less energy.  Lose interest in your daily activities.  Feel sad and grouchy for a long time.  Depression is very common. It affects people of all ages.  Many things can lead to depression. Some people become depressed after they have a stroke or find out they have a major illness like cancer or heart disease. The death of a loved one or a breakup may lead to depression. It can run in families. Most experts believe that a combination of inherited genes and stressful life events can cause it.  What happens during screening?  You may be asked to fill out a form about your depression symptoms. You and the doctor will discuss your answers. The doctor may ask you more questions to learn more about how you think, act, and feel.  What happens after screening?  If you have symptoms of depression, your doctor will talk to you about your options.  Doctors usually treat depression with medicines or counseling. Often, combining the two works best. Many people don't get help because they think that they'll get over the depression on their own. But people with depression may not get better unless they " "get treatment.  The cause of depression is not well understood. There may be many factors involved. But if you have depression, it's not your fault.  A serious symptom of depression is thinking about death or suicide. If you or someone you care about talks about this or about feeling hopeless, get help right away.  It's important to know that depression can be treated. Medicine, counseling, and self-care may help.  Where can you learn more?  Go to https://www.[employer].net/patiented  Enter T185 in the search box to learn more about \"Learning About Depression Screening.\"  Current as of: June 24, 2023               Content Version: 14.0    7771-8150 [employer].   Care instructions adapted under license by your healthcare professional. If you have questions about a medical condition or this instruction, always ask your healthcare professional. [employer] disclaims any warranty or liability for your use of this information.      "

## 2024-04-18 NOTE — PROGRESS NOTES
Preventive Care Visit  [facility]  Pam Arias NP, Family Medicine  Apr 18, 2024      Assessment & Plan   Problem List Items Addressed This Visit    None  Visit Diagnoses       Routine general medical examination at a health care facility    -  Primary    Relevant Orders    Pap Screen with HPV - recommended age 30 - 65 years    Diarrhea, unspecified type        Cervical cancer screening        Relevant Orders    Pap Screen with HPV - recommended age 30 - 65 years    Genital herpes simplex, unspecified site        Relevant Medications    valACYclovir (VALTREX) 500 MG tablet    Screen for STD (sexually transmitted disease)        Relevant Orders    GC/Chlamydia by PCR (Completed)    Foreign body of left ear, initial encounter                 Patient has been advised of split billing requirements and indicates understanding: Yes    1. Routine general medical examination at a health care facility  - Pap Screen with HPV - recommended age 30 - 65 years  pending    2. Diarrhea, unspecified type  She mentions loose stools without red flag symptoms. Exam is benign. Chronic. Do not suspect infectious cause of soft stools. Discussed if she has problems with constipation, abdominal pain, or new concerns could consider further evaluation but at this time no compelling indication to look into this further which she is accepting of.     3. Cervical cancer screening  - Pap Screen with HPV - recommended age 30 - 65 years  Pending, will follow results     4. Genital herpes simplex, unspecified site  refilled  - valACYclovir (VALTREX) 500 MG tablet; Take 1 tablet (500 mg) by mouth 2 times daily  Dispense: 10 tablet; Refill: 2    5. Screen for STD (sexually transmitted disease)  - GC/Chlamydia by PCR  Negative test results.     6. Foreign body of left ear, initial encounter  Flushed dog hair from ear successfully per nursing.        BMI  Estimated body mass index is 32.12 kg/m  as calculated from the  "following:    Height as of this encounter: 1.575 m (5' 2\").    Weight as of this encounter: 79.7 kg (175 lb 9.6 oz).   Weight management plan: Discussed healthy diet and exercise guidelines    Counseling  Appropriate preventive services were discussed with this patient, including applicable screening as appropriate for fall prevention, nutrition, physical activity, Tobacco-use cessation, weight loss and cognition.  Checklist reviewing preventive services available has been given to the patient.  Reviewed patient's diet, addressing concerns and/or questions.   She is at risk for lack of exercise and has been provided with information to increase physical activity for the benefit of her well-being.   The patient was instructed to see the dentist every 6 months.   The patient's PHQ-9 score is consistent with mild depression. She was provided with information regarding depression.       Return in about 53 weeks (around 4/24/2025) for Annual Wellness Visit.      Lilian Norris is a 33 year old, presenting for the following:  Physical, Ear Problem (Left ear irritation, present a few months), and Diarrhea (Or soft stool, present for at least 1 year)    Soft stools all the time - it is rare to have a formed stool for her. This has been ongoing for few years. No blood in the stools. No abdominal pain or fevers. Normal bowel pattern for her is about 2x per day. Feels bloated at times. She has felt maybe dairy/milk makes the bloating worse. Stools are not mucus-like or slimy. Stools are brown. No nausea or vomiting. No immediate family history of colon cancer, thinks her grandpa or his grandpa had colon cancer.     It feels like there is something in her left ear. It feels like there is something in her ear, moving around. Mom tried to flush it but no improvement.     Adderall started about 1 month ago. Since then stools more normal. She saw Natalee at [facility] for this new ADHD diagnosis. Feels the adderall " is helping her notice she is more functional, getting more stuff done and feeling more calm and less all over the place.     No other concerns today.         4/18/2024     8:12 AM   Additional Questions   Roomed by BENITO Carr        Health Care Directive  Patient does not have a Health Care Directive or Living Will: Patient states has Advance Directive and will bring in a copy to clinic.          4/18/2024   General Health   How would you rate your overall physical health? (!) FAIR   Feel stress (tense, anxious, or unable to sleep) Very much   (!) STRESS CONCERN      4/18/2024   Nutrition   Three or more servings of calcium each day? (!) I DON'T KNOW   Diet: I don't know   How many servings of fruit and vegetables per day? (!) 0-1   How many sweetened beverages each day? 0-1         4/18/2024   Exercise   Days per week of moderate/strenous exercise 2 days   (!) EXERCISE CONCERN      4/18/2024   Social Factors   Frequency of gathering with friends or relatives Twice a week   Worry food won't last until get money to buy more No   Food not last or not have enough money for food? No   Do you have housing?  Yes   Are you worried about losing your housing? No   Lack of transportation? No   Unable to get utilities (heat,electricity)? No         4/18/2024   Dental   Dentist two times every year? (!) NO         4/18/2024   TB Screening   Were you born outside of the [place]? No       Today's PHQ-9 Score:       4/18/2024     8:01 AM   PHQ-9 SCORE   PHQ-9 Total Score MyChart 9 (Mild depression)   PHQ-9 Total Score 9         4/18/2024   Substance Use   Alcohol more than 3/day or more than 7/wk No   Do you use any other substances recreationally? No     Social History     Tobacco Use    Smoking status: Never     Passive exposure: Past    Smokeless tobacco: Never   Vaping Use    Vaping status: Every Day    Substances: Nicotine (vaping)    Devices: Disposable   Substance Use Topics    Alcohol use: Yes     Alcohol/week: 0.0 standard  drinks of alcohol     Comment: Alcoholic Drinks/day: 1 drink/month    Drug use: Not Currently     Comment: marijuana - quit a few years ago 8/8/18 4/18/2024   Breast Cancer Screening   Family history of breast, colon, or ovarian cancer? Yes         4/18/2024   LAST FHS-7 RESULTS   1st degree relative breast or ovarian cancer Yes   Any relative bilateral breast cancer Unknown   Any male have breast cancer No   Any ONE woman have BOTH breast AND ovarian cancer Unknown   Any woman with breast cancer before 50yrs Yes   2 or more relatives with breast AND/OR ovarian cancer Yes   2 or more relatives with breast AND/OR bowel cancer Unknown        Mammogram Screening - Patient under 40 years of age: Routine Mammogram Screening not recommended.         4/18/2024   STI Screening   New sexual partner(s) since last STI/HIV test? No     History of abnormal Pap smear: NO - age 30- 65 PAP every 3 years recommended        Latest Ref Rng & Units 3/18/2021     2:56 PM 5/23/2017    12:00 PM 5/23/2017    12:00 AM   PAP / HPV   PAP Negative for squamous intraepithelial lesion or malignancy. Negative for squamous intraepithelial lesion or malignancy  Electronically signed by Elaina Ornelas CT (ASCP) on 3/22/2021 at 12:07 PM        PAP (Historical)    NIL    HPV 16 DNA NEG  Negative     HPV 18 DNA NEG  Negative     Other HR HPV NEG  Negative             4/18/2024   Contraception/Family Planning   Questions about contraception or family planning (!) YES         Reviewed and updated as needed this visit by Provider   Tobacco     Med Hx  Surg Hx  Fam Hx              Review of Systems  Constitutional, neuro, ENT, endocrine, pulmonary, cardiac, gastrointestinal, genitourinary, musculoskeletal, integument and psychiatric systems are negative, except as otherwise noted.     Objective    Exam  /78 (BP Location: Right arm, Patient Position: Sitting, Cuff Size: Adult Regular)   Pulse 78   Temp 97.6  F (36.4  C)  "(Temporal)   Resp 17   Ht 1.575 m (5' 2\")   Wt 79.7 kg (175 lb 9.6 oz)   SpO2 97%   BMI 32.12 kg/m     Estimated body mass index is 32.12 kg/m  as calculated from the following:    Height as of this encounter: 1.575 m (5' 2\").    Weight as of this encounter: 79.7 kg (175 lb 9.6 oz).    Physical Exam  Vitals and nursing note reviewed. Exam conducted with a chaperone present.   Constitutional:       General: She is not in acute distress.     Appearance: Normal appearance. She is obese. She is not ill-appearing or toxic-appearing.   HENT:      Head: Normocephalic.      Right Ear: Tympanic membrane, ear canal and external ear normal. There is no impacted cerumen.      Left Ear: Tympanic membrane, ear canal and external ear normal. There is no impacted cerumen.      Nose: Nose normal. No congestion or rhinorrhea.      Mouth/Throat:      Mouth: Mucous membranes are moist.      Pharynx: No oropharyngeal exudate or posterior oropharyngeal erythema.   Eyes:      General:         Right eye: No discharge.         Left eye: No discharge.      Extraocular Movements: Extraocular movements intact.      Pupils: Pupils are equal, round, and reactive to light.   Neck:      Vascular: No carotid bruit.   Cardiovascular:      Rate and Rhythm: Normal rate and regular rhythm.      Pulses: Normal pulses.      Heart sounds: Normal heart sounds. No murmur heard.     No friction rub. No gallop.   Pulmonary:      Effort: Pulmonary effort is normal. No respiratory distress.      Breath sounds: Normal breath sounds. No wheezing.   Chest:   Breasts:     Breasts are symmetrical.      Right: Normal. No swelling, bleeding, inverted nipple, mass, nipple discharge, skin change or tenderness.      Left: Normal. No swelling, bleeding, inverted nipple, mass, nipple discharge, skin change or tenderness.   Abdominal:      General: Abdomen is flat. Bowel sounds are normal. There is no distension.      Palpations: Abdomen is soft. There is no mass.      " Tenderness: There is no abdominal tenderness. There is no right CVA tenderness, left CVA tenderness, guarding or rebound.      Hernia: No hernia is present. There is no hernia in the left inguinal area or right inguinal area.   Genitourinary:     General: Normal vulva.      Pubic Area: No rash.       Labia:         Right: No rash, tenderness, lesion or injury.         Left: No rash, tenderness, lesion or injury.       Urethra: No prolapse, urethral pain, urethral swelling or urethral lesion.   Musculoskeletal:         General: No swelling. Normal range of motion.      Cervical back: Normal range of motion and neck supple. No rigidity or tenderness.      Right lower leg: No edema.      Left lower leg: No edema.   Lymphadenopathy:      Cervical: No cervical adenopathy.      Upper Body:      Right upper body: No supraclavicular or axillary adenopathy.      Left upper body: No supraclavicular or axillary adenopathy.      Lower Body: No right inguinal adenopathy. No left inguinal adenopathy.   Skin:     General: Skin is warm and dry.      Capillary Refill: Capillary refill takes less than 2 seconds.      Coloration: Skin is not jaundiced or pale.      Findings: No rash.   Neurological:      General: No focal deficit present.      Mental Status: She is alert and oriented to person, place, and time.   Psychiatric:         Mood and Affect: Mood normal.         Behavior: Behavior normal.         Signed Electronically by: Pam Arias NP

## 2024-04-18 NOTE — NURSING NOTE
"Chief Complaint   Patient presents with    Physical    Ear Problem     Left ear irritation, present a few months    Diarrhea     Or soft stool, present for at least 1 year       Initial /78 (BP Location: Right arm, Patient Position: Sitting, Cuff Size: Adult Regular)   Pulse 78   Temp 97.6  F (36.4  C) (Temporal)   Resp 17   Ht 1.575 m (5' 2\")   Wt 79.7 kg (175 lb 9.6 oz)   SpO2 97%   BMI 32.12 kg/m   Estimated body mass index is 32.12 kg/m  as calculated from the following:    Height as of this encounter: 1.575 m (5' 2\").    Weight as of this encounter: 79.7 kg (175 lb 9.6 oz).  Medication Review: complete    The next two questions are to help us understand your food security.  If you are feeling you need any assistance in this area, we have resources available to support you today.          4/18/2024   SDOH- Food Insecurity   Within the past 12 months, did you worry that your food would run out before you got money to buy more? N   Within the past 12 months, did the food you bought just not last and you didn t have money to get more? N           Nayeli Stanford      "

## 2024-04-24 ENCOUNTER — OFFICE VISIT (OUTPATIENT)
Dept: OBGYN | Facility: OTHER | Age: 34
End: 2024-04-24
Payer: COMMERCIAL

## 2024-04-24 VITALS
DIASTOLIC BLOOD PRESSURE: 70 MMHG | HEART RATE: 84 BPM | BODY MASS INDEX: 31.83 KG/M2 | WEIGHT: 174 LBS | SYSTOLIC BLOOD PRESSURE: 124 MMHG

## 2024-04-24 DIAGNOSIS — Z30.46 ENCOUNTER FOR REMOVAL AND REINSERTION OF NEXPLANON: Primary | ICD-10-CM

## 2024-04-24 DIAGNOSIS — Z01.812 PRE-PROCEDURE LAB EXAM: ICD-10-CM

## 2024-04-24 PROCEDURE — G0463 HOSPITAL OUTPT CLINIC VISIT: HCPCS

## 2024-04-24 PROCEDURE — 250N000011 HC RX IP 250 OP 636

## 2024-04-24 PROCEDURE — 250N000009 HC RX 250

## 2024-04-24 PROCEDURE — 11981 INSERTION DRUG DLVR IMPLANT: CPT

## 2024-04-24 PROCEDURE — 11982 REMOVE DRUG IMPLANT DEVICE: CPT

## 2024-04-24 PROCEDURE — 11983 REMOVE/INSERT DRUG IMPLANT: CPT

## 2024-04-24 PROCEDURE — G0463 HOSPITAL OUTPT CLINIC VISIT: HCPCS | Mod: 25,27

## 2024-04-24 RX ORDER — LIDOCAINE HCL/EPINEPHRINE/PF 2%-1:200K
3 VIAL (ML) INJECTION ONCE
Status: COMPLETED | OUTPATIENT
Start: 2024-04-24 | End: 2024-04-24

## 2024-04-24 RX ADMIN — ETONOGESTREL 68 MG: 68 IMPLANT SUBCUTANEOUS at 11:04

## 2024-04-24 RX ADMIN — LIDOCAINE HYDROCHLORIDE,EPINEPHRINE BITARTRATE 3 ML: 20; .005 INJECTION, SOLUTION EPIDURAL; INFILTRATION; INTRACAUDAL; PERINEURAL at 11:04

## 2024-04-24 NOTE — PROGRESS NOTES
Procedure Note-Nexplanon Removal & Reinsertion    Myrna Martinez is here for removal & reinsertion of etonogestrel implant Nexplanon/Implanon    Indication: contraception, Pt declined UPT due to not being sexually active    Consent: Affirmation of informed consent was signed and scanned into the medical record. Risks, benefits and alternatives were discussed. Patient's questions were elicited and answered.   Procedure safety checklist was completed:  Yes  Time Out (Pause for the Cause) completed: Yes      Preoperative Diagnosis: etonogestrel implant  Postoperative Diagnosis: etonogestrel implant removed & reinserted    Technique: On the left arm  Skin prep:  Chloraprep  Anesthesia 2% lidocaine, with epi  Procedure: Small incision (<5mm) was made at distal end of palpable implant, curved hemostat was used to isolate the implant and bring it to the incision, the fibrous capsule containing the implant  was incised and the Implant was removed intact.  EBL: minimal  Complications:  No    Nexplanon device visualized in applicator by patient and provider.  Skin punctured with applicator at insertion site where previous Nexplanon was removed and advanced with ease in the intradermal space.  Applicator was removed.  Nexplanon was palpated by provider and patient.    Small amount of bleeding noted at insertion site. Bandage and pressure dressing applied to insertion site.       Patient tolerated procedure well.  Lot number [id number] , Exp 11/30/2025 .  To be removed/replaced by 04/24/2027.    Written and verbal instructions provided to patient.    BALBINA Li-C  OB/GYN  4/24/2024 11:15 AM

## 2024-04-24 NOTE — NURSING NOTE
Chief Complaint   Patient presents with    Procedure     Nexplanon replacement        Medication Reconciliation: complete        Casandra Bales LPN

## 2024-04-30 LAB
HUMAN PAPILLOMA VIRUS 16 DNA: NEGATIVE
HUMAN PAPILLOMA VIRUS 18 DNA: NEGATIVE
HUMAN PAPILLOMA VIRUS FINAL DIAGNOSIS: NORMAL
HUMAN PAPILLOMA VIRUS OTHER HR: NEGATIVE

## 2024-05-01 LAB
BKR LAB AP GYN ADEQUACY: NORMAL
BKR LAB AP GYN INTERPRETATION: NORMAL
BKR LAB AP HPV REFLEX: NORMAL
BKR LAB AP PREVIOUS ABNORMAL: NORMAL
PATH REPORT.COMMENTS IMP SPEC: NORMAL
PATH REPORT.COMMENTS IMP SPEC: NORMAL
PATH REPORT.RELEVANT HX SPEC: NORMAL

## 2024-06-09 ENCOUNTER — NURSE TRIAGE (OUTPATIENT)
Dept: NURSING | Facility: CLINIC | Age: 34
End: 2024-06-09
Payer: COMMERCIAL

## 2024-06-09 NOTE — TELEPHONE ENCOUNTER
Nurse Triage SBAR    Is this a 2nd Level Triage? NO    Situation:   Possible super glue in her eye    Background:   Pt is up tonight doing crafts.  She got super glue on her finger and rubbed her eye.    Assessment:   She immediately felt a burning sensation in her eye but none during the call.  She feels like something is in her eye but it's a mild feeling.  Pt has not flushed eye yet.  Advised pt to flush eye.  Pt agreed and after flushing, she states that she saw something white come out of her eye.  Feels better.    Protocol Recommended Disposition:   Call Poison Center Now (After Decontamination)    Recommendation:   Advised pt to call poison center.  Number provided and pt was transferred.  Care advice was given.    Farzana Montana RN, BSN Nurse Triage Advisor 6/9/2024 12:43 AM        Reason for Disposition   [1] Known chemical AND [2] not on the HARMLESS list    Additional Information   Negative: Acid or alkali was the chemical  (Exceptions: mild household bleach or ammonia)   Negative: [1] Unknown chemical AND [2] any eye symptoms (e.g., pain)   Negative: [1] Harmful or possibly harmful chemical AND [2] unable to carry out flushing (irrigation) at home or work   Negative: [1] Blurred vision AND [2] persists > 1 hour since flushing (regardless of duration of flushing [irrigation])   Negative: [1] Eye pain AND [2] persists > 1 hour since flushing (regardless of duration of flushing [irrigation])   Negative: [1] Continued tearing or blinking AND [2] persists > 1 hour since flushing (regardless of duration of flushing [irrigation])   Negative: Cloudy spot or sore on the cornea (clear central part of eye)   Negative: Shortness of breath   Negative: Household bleach or ammonia   Negative: Laundry or  detergent POD    Protocols used: Eye - Chemical In-A-

## 2024-06-26 ENCOUNTER — OFFICE VISIT (OUTPATIENT)
Dept: FAMILY MEDICINE | Facility: OTHER | Age: 34
End: 2024-06-26
Attending: NURSE PRACTITIONER
Payer: COMMERCIAL

## 2024-06-26 VITALS
RESPIRATION RATE: 16 BRPM | TEMPERATURE: 97.5 F | BODY MASS INDEX: 31.47 KG/M2 | WEIGHT: 171 LBS | SYSTOLIC BLOOD PRESSURE: 114 MMHG | OXYGEN SATURATION: 99 % | HEIGHT: 62 IN | DIASTOLIC BLOOD PRESSURE: 62 MMHG | HEART RATE: 90 BPM

## 2024-06-26 DIAGNOSIS — B96.89 BV (BACTERIAL VAGINOSIS): Primary | ICD-10-CM

## 2024-06-26 DIAGNOSIS — N76.0 BV (BACTERIAL VAGINOSIS): Primary | ICD-10-CM

## 2024-06-26 DIAGNOSIS — N89.8 VAGINAL ITCHING: ICD-10-CM

## 2024-06-26 DIAGNOSIS — B37.31 YEAST INFECTION OF THE VAGINA: ICD-10-CM

## 2024-06-26 LAB
ALBUMIN UR-MCNC: NEGATIVE MG/DL
APPEARANCE UR: CLEAR
BACTERIAL VAGINOSIS VAG-IMP: POSITIVE
BILIRUB UR QL STRIP: NEGATIVE
CANDIDA DNA VAG QL NAA+PROBE: DETECTED
CANDIDA GLABRATA / CANDIDA KRUSEI DNA: NOT DETECTED
COLOR UR AUTO: ABNORMAL
GLUCOSE UR STRIP-MCNC: NEGATIVE MG/DL
HGB UR QL STRIP: NEGATIVE
KETONES UR STRIP-MCNC: NEGATIVE MG/DL
LEUKOCYTE ESTERASE UR QL STRIP: ABNORMAL
MUCOUS THREADS #/AREA URNS LPF: PRESENT /LPF
NITRATE UR QL: NEGATIVE
PH UR STRIP: 5.5 [PH] (ref 5–9)
RBC URINE: 2 /HPF
SP GR UR STRIP: 1.02 (ref 1–1.03)
SQUAMOUS EPITHELIAL: 5 /HPF
T VAGINALIS DNA VAG QL NAA+PROBE: NOT DETECTED
UROBILINOGEN UR STRIP-MCNC: NORMAL MG/DL
WBC URINE: 2 /HPF

## 2024-06-26 PROCEDURE — 87086 URINE CULTURE/COLONY COUNT: CPT | Mod: ZL | Performed by: NURSE PRACTITIONER

## 2024-06-26 PROCEDURE — 81001 URINALYSIS AUTO W/SCOPE: CPT | Mod: ZL | Performed by: NURSE PRACTITIONER

## 2024-06-26 PROCEDURE — 99214 OFFICE O/P EST MOD 30 MIN: CPT | Performed by: NURSE PRACTITIONER

## 2024-06-26 PROCEDURE — G0463 HOSPITAL OUTPT CLINIC VISIT: HCPCS

## 2024-06-26 PROCEDURE — 0352U MULTIPLEX VAGINAL PANEL BY PCR: CPT | Mod: ZL | Performed by: NURSE PRACTITIONER

## 2024-06-26 RX ORDER — MONTELUKAST SODIUM 10 MG/1
TABLET ORAL
COMMUNITY
Start: 2024-06-03

## 2024-06-26 RX ORDER — FLUCONAZOLE 150 MG/1
150 TABLET ORAL ONCE
Qty: 2 TABLET | Refills: 0 | Status: SHIPPED | OUTPATIENT
Start: 2024-06-26 | End: 2024-06-26

## 2024-06-26 RX ORDER — DEXTROAMPHETAMINE SACCHARATE, AMPHETAMINE ASPARTATE MONOHYDRATE, DEXTROAMPHETAMINE SULFATE AND AMPHETAMINE SULFATE 3.75; 3.75; 3.75; 3.75 MG/1; MG/1; MG/1; MG/1
CAPSULE, EXTENDED RELEASE ORAL
COMMUNITY
Start: 2024-06-12

## 2024-06-26 RX ORDER — METRONIDAZOLE 500 MG/1
500 TABLET ORAL 2 TIMES DAILY
Qty: 14 TABLET | Refills: 0 | Status: SHIPPED | OUTPATIENT
Start: 2024-06-26 | End: 2024-07-03

## 2024-06-26 ASSESSMENT — ANXIETY QUESTIONNAIRES
3. WORRYING TOO MUCH ABOUT DIFFERENT THINGS: SEVERAL DAYS
IF YOU CHECKED OFF ANY PROBLEMS ON THIS QUESTIONNAIRE, HOW DIFFICULT HAVE THESE PROBLEMS MADE IT FOR YOU TO DO YOUR WORK, TAKE CARE OF THINGS AT HOME, OR GET ALONG WITH OTHER PEOPLE: SOMEWHAT DIFFICULT
2. NOT BEING ABLE TO STOP OR CONTROL WORRYING: SEVERAL DAYS
8. IF YOU CHECKED OFF ANY PROBLEMS, HOW DIFFICULT HAVE THESE MADE IT FOR YOU TO DO YOUR WORK, TAKE CARE OF THINGS AT HOME, OR GET ALONG WITH OTHER PEOPLE?: SOMEWHAT DIFFICULT
GAD7 TOTAL SCORE: 6
7. FEELING AFRAID AS IF SOMETHING AWFUL MIGHT HAPPEN: NOT AT ALL
7. FEELING AFRAID AS IF SOMETHING AWFUL MIGHT HAPPEN: NOT AT ALL
GAD7 TOTAL SCORE: 6
4. TROUBLE RELAXING: SEVERAL DAYS
1. FEELING NERVOUS, ANXIOUS, OR ON EDGE: SEVERAL DAYS
6. BECOMING EASILY ANNOYED OR IRRITABLE: SEVERAL DAYS
5. BEING SO RESTLESS THAT IT IS HARD TO SIT STILL: SEVERAL DAYS
GAD7 TOTAL SCORE: 6

## 2024-06-26 ASSESSMENT — PATIENT HEALTH QUESTIONNAIRE - PHQ9
SUM OF ALL RESPONSES TO PHQ QUESTIONS 1-9: 4
10. IF YOU CHECKED OFF ANY PROBLEMS, HOW DIFFICULT HAVE THESE PROBLEMS MADE IT FOR YOU TO DO YOUR WORK, TAKE CARE OF THINGS AT HOME, OR GET ALONG WITH OTHER PEOPLE: NOT DIFFICULT AT ALL
SUM OF ALL RESPONSES TO PHQ QUESTIONS 1-9: 4

## 2024-06-26 ASSESSMENT — PAIN SCALES - GENERAL: PAINLEVEL: MODERATE PAIN (5)

## 2024-06-26 NOTE — PROGRESS NOTES
Myrna Martinez  1990    ASSESSMENT/PLAN  1. Vaginal itching  - UA Macroscopic with reflex to Microscopic and Culture  - Multiplex Vaginal Panel by PCR  - Urine Culture    1 month of vaginal irritation, burning and discomfort.  Has noticed some changes in discharge.  Recommend obtaining urinalysis to rule out urinary infection.  Will also obtain vaginitis panel.  Patient is agreeable.    2. BV (bacterial vaginosis)  - metroNIDAZOLE (FLAGYL) 500 MG tablet; Take 1 tablet (500 mg) by mouth 2 times daily for 7 days  Dispense: 14 tablet; Refill: 0  Vaginitis panel returned positive for Bacterial Vaginosis.  We review etiology of Bacterial Vaginosis.  Recommend treatment with oral Flagyl twice daily for 7 days.  Recommend patient avoid alcohol while on this antibiotic.     3. Yeast infection of the vagina  - fluconazole (DIFLUCAN) 150 MG tablet; Take 1 tablet (150 mg) by mouth once for 1 dose Take second tablet in 7 days.  Dispense: 2 tablet; Refill: 0   Vaginitis panel returned positive for vaginal yeast infection.  Recommend treatment with Diflucan once daily today and repeating dose in 7 days after completing treatment of oral Flagyl.      Explanation of diagnosis, treatment options and risk and benefits of medications reviewed with patient. Patient agrees with plan of care.  All questions were answered.  Red flags symptoms were discussed and patient was advised when they should return for reevaluation or for prompt emergency evaluation.   Patient was given verbal and written instructions on plan of care. Instructions were printed or are available on [facility] on electronic AVS.     SUBJECTIVE  CHIEF COMPLAINT/ REASON FOR VISIT  Patient presents with:  Vaginal Problem: X 1 month worsening     HISTORY OF PRESENT ILLNESS  Myrna Martinez is a pleasant 34 year old female presents to clinic today for evaluation of vaginal irritation, itching and pain.  Patient has been having worsening and persistent symptoms.  She  "states her vaginal area burns especially when she wipes.  She has noticed increase in discharge which appears pink-tinged at times.  She denies any dysuria, urinary frequency or urgency.  No abdominal pain or back pain.  She has not had intercourse over the past year, no concern for sexually transmitted infection.    Patient does not get a monthly menstrual cycle as she has Nexplanon implant in place for contraception.    I have reviewed the nursing notes.  I have reviewed allergies, medication list, problem list, and past medical history.    REVIEW OF SYSTEMS  Review of Systems   All other systems reviewed and are negative.     VITAL SIGNS  Vitals:    06/26/24 1248   BP: 114/62   BP Location: Right arm   Patient Position: Sitting   Cuff Size: Adult Regular   Pulse: 90   Resp: 16   Temp: 97.5  F (36.4  C)   TempSrc: Tympanic   SpO2: 99%   Weight: 77.6 kg (171 lb)   Height: 1.575 m (5' 2\")      Body mass index is 31.28 kg/m .    OBJECTIVE  PHYSICAL EXAM  Physical Exam  Vitals reviewed. Exam conducted with a chaperone present.   Constitutional:       Appearance: Normal appearance. She is not ill-appearing or toxic-appearing.   HENT:      Head: Normocephalic and atraumatic.      Nose: Nose normal.   Eyes:      Conjunctiva/sclera: Conjunctivae normal.   Pulmonary:      Effort: Pulmonary effort is normal.   Genitourinary:     Comments: Female exam-  Skin warm dry and intact.  No visible rash or abrasions.  No visible concerning external vaginal or anal lesions.  Cervix visible and non friable.  Moderate amount of white vaginal discharge . No visible foreign body.       Musculoskeletal:      Right lower leg: No edema.      Left lower leg: No edema.   Skin:     Findings: No rash.   Neurological:      General: No focal deficit present.      Mental Status: She is alert and oriented to person, place, and time.   Psychiatric:         Mood and Affect: Mood normal.         Behavior: Behavior normal.         Thought Content: " Thought content normal.         Judgment: Judgment normal.        DIAGNOSTICS  Results for orders placed or performed in visit on 06/26/24   UA Macroscopic with reflex to Microscopic and Culture     Status: Abnormal    Specimen: Urine, Clean Catch   Result Value Ref Range    Color Urine Light Yellow Colorless, Straw, Light Yellow, Yellow    Appearance Urine Clear Clear    Glucose Urine Negative Negative mg/dL    Bilirubin Urine Negative Negative    Ketones Urine Negative Negative mg/dL    Specific Gravity Urine 1.020 1.000 - 1.030    Blood Urine Negative Negative    pH Urine 5.5 5.0 - 9.0    Protein Albumin Urine Negative Negative mg/dL    Urobilinogen Urine Normal Normal, 2.0 mg/dL    Nitrite Urine Negative Negative    Leukocyte Esterase Urine Moderate (A) Negative    Mucus Urine Present (A) None Seen /LPF    RBC Urine 2 <=2 /HPF    WBC Urine 2 <=5 /HPF    Squamous Epithelials Urine 5 (H) <=1 /HPF    Narrative    Urine Culture ordered based on laboratory criteria   Multiplex Vaginal Panel by PCR     Status: Abnormal    Specimen: Vagina; Swab   Result Value Ref Range    Bacterial Vaginosis Organism DNA Positive (A) Negative    Candida Group DNA Detected (A) Not Detected    Candida glabrata / Marleni krusei DNA Not Detected Not Detected    Trichomonas vaginalis DNA Not Detected Not Detected    Narrative    The Xpert  Xpress MVP test, performed on the [employer]ument Systems, is an automated, qualitative in vitro diagnostic test for the detection of DNA targets from anaerobic bacteria associated with bacterial vaginosis, Candida species associated with vulvovaginal candidiasis, and Trichomonas vaginalis. The assay uses clinician-collected and self-collected vaginal swabs from patients who are symptomatic for vaginitis/ vaginosis. The Xpert  Xpress MVP test utilizes real-time polymerase chain reaction (PCR) for the amplification of specific DNA targets and utilizes fluorogenic target-specific hybridization probes  to detect and differentiate DNA. It is intended to aid in the diagnosis of vaginal infections in women with a clinical presentation consistent with bacterial vaginosis, vulvovaginal candidiasis, or trichomoniasis.   The assay targets three anaerobic microorgansims that are associated with bacterial vaginosis (BV). Other organisms that are not detected by the Xpert  Xpress MVP test have also been reported to be associated with BV. The BV organism and Candida species targets of the Xpert  Xpress MVP test can be commensal in women; positive results must be considered in conjunction with other clinical and patient information to determine the disease status.        Maritza Valentin NP  [facility]

## 2024-06-26 NOTE — NURSING NOTE
"Chief Complaint   Patient presents with    Vaginal Problem     X 1 month worsening     Patient in clinic with Mom  Not tx today  Patient has HSV but does not feel this is related  Blood with wiping sometimes.  Itching and pain has worsened this past month.    Initial /62 (BP Location: Right arm, Patient Position: Sitting, Cuff Size: Adult Regular)   Pulse 90   Temp 97.5  F (36.4  C) (Tympanic)   Resp 16   Ht 1.575 m (5' 2\")   Wt 77.6 kg (171 lb)   SpO2 99%   BMI 31.28 kg/m   Estimated body mass index is 31.28 kg/m  as calculated from the following:    Height as of this encounter: 1.575 m (5' 2\").    Weight as of this encounter: 77.6 kg (171 lb).     Advance Care Directive on file? no    FOOD SECURITY SCREENING QUESTIONS:    The next two questions are to help us understand your food security.  If you are feeling you need any assistance in this area, we have resources available to support you today.    Hunger Vital Signs:  Within the past 12 months we worried whether our food would run out before we got money to buy more. Never  Within the past 12 months the food we bought just didn't last and we didn't have money to get more. Never  Geneva Ramsey LPN,ERIC on 6/26/2024 at 12:48 PM      Geneva Ramsey LPN     "

## 2024-06-28 LAB — BACTERIA UR CULT: NO GROWTH

## 2024-09-01 ENCOUNTER — OFFICE VISIT (OUTPATIENT)
Dept: FAMILY MEDICINE | Facility: OTHER | Age: 34
End: 2024-09-01
Payer: COMMERCIAL

## 2024-09-01 VITALS
WEIGHT: 170.9 LBS | BODY MASS INDEX: 31.45 KG/M2 | SYSTOLIC BLOOD PRESSURE: 112 MMHG | RESPIRATION RATE: 18 BRPM | OXYGEN SATURATION: 97 % | DIASTOLIC BLOOD PRESSURE: 82 MMHG | HEIGHT: 62 IN | TEMPERATURE: 98.1 F | HEART RATE: 108 BPM

## 2024-09-01 DIAGNOSIS — H04.123 DRY EYES: ICD-10-CM

## 2024-09-01 DIAGNOSIS — L73.9 FOLLICULITIS: Primary | ICD-10-CM

## 2024-09-01 PROCEDURE — 99213 OFFICE O/P EST LOW 20 MIN: CPT

## 2024-09-01 PROCEDURE — G0463 HOSPITAL OUTPT CLINIC VISIT: HCPCS

## 2024-09-01 RX ORDER — OLOPATADINE HYDROCHLORIDE 2 MG/ML
1 SOLUTION/ DROPS OPHTHALMIC DAILY
Qty: 2.5 ML | Refills: 0 | Status: SHIPPED | OUTPATIENT
Start: 2024-09-01

## 2024-09-01 RX ORDER — CEPHALEXIN 500 MG/1
500 CAPSULE ORAL 4 TIMES DAILY
Qty: 20 CAPSULE | Refills: 0 | Status: SHIPPED | OUTPATIENT
Start: 2024-09-01 | End: 2024-09-06

## 2024-09-01 ASSESSMENT — ANXIETY QUESTIONNAIRES
GAD7 TOTAL SCORE: 0
6. BECOMING EASILY ANNOYED OR IRRITABLE: NOT AT ALL
7. FEELING AFRAID AS IF SOMETHING AWFUL MIGHT HAPPEN: NOT AT ALL
GAD7 TOTAL SCORE: 0
5. BEING SO RESTLESS THAT IT IS HARD TO SIT STILL: NOT AT ALL
3. WORRYING TOO MUCH ABOUT DIFFERENT THINGS: NOT AT ALL
7. FEELING AFRAID AS IF SOMETHING AWFUL MIGHT HAPPEN: NOT AT ALL
4. TROUBLE RELAXING: NOT AT ALL
2. NOT BEING ABLE TO STOP OR CONTROL WORRYING: NOT AT ALL
IF YOU CHECKED OFF ANY PROBLEMS ON THIS QUESTIONNAIRE, HOW DIFFICULT HAVE THESE PROBLEMS MADE IT FOR YOU TO DO YOUR WORK, TAKE CARE OF THINGS AT HOME, OR GET ALONG WITH OTHER PEOPLE: NOT DIFFICULT AT ALL
1. FEELING NERVOUS, ANXIOUS, OR ON EDGE: NOT AT ALL
GAD7 TOTAL SCORE: 0
8. IF YOU CHECKED OFF ANY PROBLEMS, HOW DIFFICULT HAVE THESE MADE IT FOR YOU TO DO YOUR WORK, TAKE CARE OF THINGS AT HOME, OR GET ALONG WITH OTHER PEOPLE?: NOT DIFFICULT AT ALL

## 2024-09-01 ASSESSMENT — PATIENT HEALTH QUESTIONNAIRE - PHQ9
SUM OF ALL RESPONSES TO PHQ QUESTIONS 1-9: 0
10. IF YOU CHECKED OFF ANY PROBLEMS, HOW DIFFICULT HAVE THESE PROBLEMS MADE IT FOR YOU TO DO YOUR WORK, TAKE CARE OF THINGS AT HOME, OR GET ALONG WITH OTHER PEOPLE: NOT DIFFICULT AT ALL

## 2024-09-01 ASSESSMENT — PAIN SCALES - GENERAL: PAINLEVEL: NO PAIN (0)

## 2024-09-01 NOTE — PROGRESS NOTES
ASSESSMENT/PLAN:    I have reviewed the nursing notes.  I have reviewed the findings, diagnosis, plan and need for follow up with the patient.    1. Folliculitis  - cephALEXin (KEFLEX) 500 MG capsule; Take 1 capsule (500 mg) by mouth 4 times daily for 5 days.  Dispense: 20 capsule; Refill: 0    Patient presents with a rash on her groin and bilateral lower extremities.  Discussed with patient that her rash appears to be folliculitis, most likely due to shaving.  Will treat her folliculitis with Keflex.  Advised patient to replace her razor and to make sure not to share any towels, washcloths, or razors.  Keep skin clean and dry.  May try an over-the-counter antihistamine and cool compresses to help with the pruritus.    2. Dry eyes  - olopatadine (PATADAY) 0.2 % ophthalmic solution; Place 0.05 mLs (1 drop) into both eyes daily.  Dispense: 2.5 mL; Refill: 0    Patient has also been experiencing dry eyes.  Will try Pataday ophthalmic solution.  Patient has never been seen by an ophthalmologist so advised patient that she should also schedule appointment with an ophthalmologist for routine exam.  Discussed that an over-the-counter antihistamine may also help with her dry itchy eyes as well.    Discussed warning signs/symptoms indicative of need to f/u    Follow up if symptoms persist or worsen or concerns    I explained my diagnostic considerations and recommendations to the patient, who voiced understanding and agreement with the treatment plan. All questions were answered. We discussed potential side effects of any prescribed or recommended therapies, as well as expectations for response to treatments.    Osman Templeton, EVELIO CNP  9/1/2024  10:39 AM    HPI:    Myrna Martinez is a 34 year old female  who presents to [facility] today for concerns of rash    Onset of rash was 3 days ago.  Location of the rash: groin and legs.  Associated symptoms include: itching and redness.  Symptoms appear to be  stable.  Therapies tried to improve the rash: none.  Previous history of a similar rash? Yes: but not this extensive, she does get occasional ingrown hairs  Recent exposure history: none known     Patient also states that for the past month her eyes have felt very dry. She has tried OTC eye drops with minimal relief.     Past Medical History:   Diagnosis Date    ADHD (attention deficit hyperactivity disorder)     Depression     [facility], was homeless at the time    Headaches, cluster     Herpes genitalis in women     takes acyclovir    Papanicolaou smear of cervix with low grade squamous intraepithelial lesion (LGSIL)     Seasonal allergies      Past Surgical History:   Procedure Laterality Date    APPENDECTOMY  01/01/1996    MAMMOPLASTY REDUCTION  01/01/2011    nasal polyp removal  03/2024    done at [facility] with Dr. Lares    TOOTH EXTRACTION       Social History     Tobacco Use    Smoking status: Never     Passive exposure: Past    Smokeless tobacco: Never   Substance Use Topics    Alcohol use: Yes     Alcohol/week: 0.0 standard drinks of alcohol     Comment: Alcoholic Drinks/day: 1 drink/month     Current Outpatient Medications   Medication Sig Dispense Refill    amphetamine-dextroamphetamine (ADDERALL XR) 15 MG 24 hr capsule       etonogestrel (IMPLANON/NEXPLANON) 68 MG IMPL 1 each (68 mg) by Subdermal route continuous  0    montelukast (SINGULAIR) 10 MG tablet       valACYclovir (VALTREX) 500 MG tablet Take 1 tablet (500 mg) by mouth 2 times daily 10 tablet 2    amphetamine-dextroamphetamine (ADDERALL XR) 10 MG 24 hr capsule  (Patient not taking: Reported on 6/26/2024)      atomoxetine (STRATTERA) 40 MG capsule Take 1 capsule (40 mg) by mouth daily (Patient not taking: Reported on 6/26/2024) 30 capsule 0     No Known Allergies  Past medical history, past surgical history, current medications and allergies reviewed and accurate to the best of my knowledge.      ROS:  Refer to HPI    /82  "(BP Location: Right arm, Patient Position: Chair, Cuff Size: Adult Regular)   Pulse 108   Temp 98.1  F (36.7  C) (Tympanic)   Resp 18   Ht 1.575 m (5' 2\")   Wt 77.5 kg (170 lb 14.4 oz)   SpO2 97%   BMI 31.26 kg/m      EXAM:  General Appearance: Well appearing 34 year old female, appropriate appearance for age. No acute distress   Eyes: conjunctivae normal without erythema or irritation, corneas clear, no drainage or crusting, no eyelid swelling, pupils equal   Dermatological: Erythematous papular rash on groin and bilateral legs, no excoriation noted, no drainage noted, no vesicles  Neuro: Alert and oriented to person, place, and time.    Psychological: normal affect, alert, oriented, and pleasant.     "

## 2024-09-01 NOTE — NURSING NOTE
"Chief Complaint   Patient presents with    Gyn Problem     Female Issues-Ongoing  Red Bumps On Legs     Eye Problem     Bilateral Eye Irritation        Initial /82 (BP Location: Right arm, Patient Position: Chair, Cuff Size: Adult Regular)   Pulse 108   Temp 98.1  F (36.7  C) (Tympanic)   Resp 18   Ht 1.575 m (5' 2\")   Wt 77.5 kg (170 lb 14.4 oz)   SpO2 97%   BMI 31.26 kg/m   Estimated body mass index is 31.26 kg/m  as calculated from the following:    Height as of this encounter: 1.575 m (5' 2\").    Weight as of this encounter: 77.5 kg (170 lb 14.4 oz).      Medication Reconciliation: Complete.       Audra Jensen LPN on 9/1/2024 at 10:35 AM     "

## 2025-02-01 ENCOUNTER — OFFICE VISIT (OUTPATIENT)
Dept: FAMILY MEDICINE | Facility: OTHER | Age: 35
End: 2025-02-01
Attending: FAMILY MEDICINE
Payer: COMMERCIAL

## 2025-02-01 VITALS
SYSTOLIC BLOOD PRESSURE: 100 MMHG | RESPIRATION RATE: 20 BRPM | BODY MASS INDEX: 29.88 KG/M2 | TEMPERATURE: 98.1 F | WEIGHT: 162.4 LBS | OXYGEN SATURATION: 98 % | DIASTOLIC BLOOD PRESSURE: 66 MMHG | HEART RATE: 88 BPM | HEIGHT: 62 IN

## 2025-02-01 DIAGNOSIS — J11.1 INFLUENZA-LIKE ILLNESS: Primary | ICD-10-CM

## 2025-02-01 LAB
FLUAV RNA SPEC QL NAA+PROBE: NEGATIVE
FLUBV RNA RESP QL NAA+PROBE: NEGATIVE
RSV RNA SPEC NAA+PROBE: NEGATIVE
SARS-COV-2 RNA RESP QL NAA+PROBE: NEGATIVE

## 2025-02-01 PROCEDURE — 87637 SARSCOV2&INF A&B&RSV AMP PRB: CPT | Mod: ZL | Performed by: REGISTERED NURSE

## 2025-02-01 PROCEDURE — 99213 OFFICE O/P EST LOW 20 MIN: CPT | Performed by: REGISTERED NURSE

## 2025-02-01 PROCEDURE — G0463 HOSPITAL OUTPT CLINIC VISIT: HCPCS | Performed by: REGISTERED NURSE

## 2025-02-01 RX ORDER — PRAZOSIN HYDROCHLORIDE 1 MG/1
1 CAPSULE ORAL
COMMUNITY
Start: 2024-12-21

## 2025-02-01 RX ORDER — DEXTROAMPHETAMINE SACCHARATE, AMPHETAMINE ASPARTATE MONOHYDRATE, DEXTROAMPHETAMINE SULFATE AND AMPHETAMINE SULFATE 5; 5; 5; 5 MG/1; MG/1; MG/1; MG/1
20 CAPSULE, EXTENDED RELEASE ORAL EVERY MORNING
COMMUNITY
Start: 2025-01-18

## 2025-02-01 ASSESSMENT — PAIN SCALES - GENERAL: PAINLEVEL_OUTOF10: MILD PAIN (2)

## 2025-02-01 ASSESSMENT — ANXIETY QUESTIONNAIRES
7. FEELING AFRAID AS IF SOMETHING AWFUL MIGHT HAPPEN: NOT AT ALL
3. WORRYING TOO MUCH ABOUT DIFFERENT THINGS: MORE THAN HALF THE DAYS
4. TROUBLE RELAXING: SEVERAL DAYS
6. BECOMING EASILY ANNOYED OR IRRITABLE: SEVERAL DAYS
2. NOT BEING ABLE TO STOP OR CONTROL WORRYING: MORE THAN HALF THE DAYS
GAD7 TOTAL SCORE: 9
IF YOU CHECKED OFF ANY PROBLEMS ON THIS QUESTIONNAIRE, HOW DIFFICULT HAVE THESE PROBLEMS MADE IT FOR YOU TO DO YOUR WORK, TAKE CARE OF THINGS AT HOME, OR GET ALONG WITH OTHER PEOPLE: SOMEWHAT DIFFICULT
5. BEING SO RESTLESS THAT IT IS HARD TO SIT STILL: MORE THAN HALF THE DAYS
GAD7 TOTAL SCORE: 9
1. FEELING NERVOUS, ANXIOUS, OR ON EDGE: SEVERAL DAYS
7. FEELING AFRAID AS IF SOMETHING AWFUL MIGHT HAPPEN: NOT AT ALL
GAD7 TOTAL SCORE: 9
8. IF YOU CHECKED OFF ANY PROBLEMS, HOW DIFFICULT HAVE THESE MADE IT FOR YOU TO DO YOUR WORK, TAKE CARE OF THINGS AT HOME, OR GET ALONG WITH OTHER PEOPLE?: SOMEWHAT DIFFICULT

## 2025-02-01 ASSESSMENT — PATIENT HEALTH QUESTIONNAIRE - PHQ9
SUM OF ALL RESPONSES TO PHQ QUESTIONS 1-9: 0
SUM OF ALL RESPONSES TO PHQ QUESTIONS 1-9: 0
10. IF YOU CHECKED OFF ANY PROBLEMS, HOW DIFFICULT HAVE THESE PROBLEMS MADE IT FOR YOU TO DO YOUR WORK, TAKE CARE OF THINGS AT HOME, OR GET ALONG WITH OTHER PEOPLE: NOT DIFFICULT AT ALL

## 2025-02-01 NOTE — NURSING NOTE
Pt here with a cough, sneezing,  sore throat since yesterday.  Rachel Ernst CMA ([facility])......................2/1/2025  12:31 PM       Medication Reconciliation: complete    Rachel Ernst CMA  2/1/2025 12:31 PM      FOOD SECURITY SCREENING QUESTIONS:    The next two questions are to help us understand your food security.  If you are feeling you need any assistance in this area, we have resources available to support you today.    Hunger Vital Signs:  Within the past 12 months we worried whether our food would run out before we got money to buy more. Never  Within the past 12 months the food we bought just didn't last and we didn't have money to get more. Never  Rachel Ernst CMA,LPN on 2/1/2025 at 12:32 PM

## 2025-02-01 NOTE — PROGRESS NOTES
Myrna Martinez  1990    ASSESSMENT/PLAN:   1. Influenza-like illness (Primary)    - Influenza A/B, RSV and SARS-CoV2 PCR (COVID-19) Nose    Patient with recent exposure to RSV this week.  Her symptoms started yesterday.  Viral testing today was  negative but likely with recent exposure this is a viral illness.  Physical exam reassuring.  Her vitals are stable, she is not toxic appearing but does appear ill.  Reviewed symptomatic care below and discussed emergent signs and symptoms to monitor for and when to seek follow up for any new or worsening symptoms.     - Push fluids to stay hydrated. Continue to eat small amounts as able and advance diet as tolerated.  - Honey, warm salt water gargles and ibuprofen for management of sore throat.  - Humidifier to help with congestion and help keep mucus membranes such as throat and nose from drying out.  - Sleeping slightly propped to help with congestion and postnasal drainage that can worsen cough at bedtime.  - Tylenol and/or Ibuprofen to manage fever and body aches.  - Reminded patient that coughing is good, coughing move secretions and helps prevent secondary infection such as pneumonia.  Avoid cough suppressants unless needed for sleep then OTC cough medications to help with cough.   - If sudden onset of new fever, worsening symptoms return for further evaluation.  - OTC antihistamine such as Allegra, Zyrtec, Claritin (generic is okay) can help with nasal/sinus congestion and OTC nasal steroid such as Flonase can help decrease sinus inflammation to help with congestion.      Patient and/or family agrees with plan of care and verbalizes understating. AVS offered and printed if patient requested. Patient education provided verbally and written instructions provided.     SUBJECTIVE:   CHIEF COMPLAINT/ REASON FOR VISIT  Patient presents with:  Cough  Pharyngitis     HISTORY OF PRESENT ILLNESS  Myrna Martinez is a pleasant 34 year old female presents to [facility]  "clinic today for URI symptoms starting yesterday including cough, sneezing, and headaches.  Her son was sick with similar symptoms and her mom tested positive for RSV this week.  She has been using Tylenol and cold and flu for symptom management.        I have reviewed the nursing notes.  I have reviewed allergies, medication list, problem list, and past medical history.    REVIEW OF SYSTEMS  Review of Systems   Constitutional:  Positive for activity change and fatigue. Negative for fever.   HENT:  Positive for rhinorrhea and sneezing. Negative for sore throat.    Respiratory:  Positive for cough.    Gastrointestinal:  Negative for abdominal distention, diarrhea, nausea and vomiting.   Neurological:  Positive for light-headedness.        VITAL SIGNS  Vitals:    02/01/25 1232   BP: 100/66   BP Location: Left arm   Patient Position: Sitting   Cuff Size: Adult Regular   Pulse: 88   Resp: 20   Temp: 98.1  F (36.7  C)   TempSrc: Tympanic   SpO2: 98%   Weight: 73.7 kg (162 lb 6.4 oz)   Height: 1.575 m (5' 2\")      Body mass index is 29.7 kg/m .    OBJECTIVE:   PHYSICAL EXAM  Physical Exam  Vitals and nursing note reviewed.   Constitutional:       Appearance: Normal appearance.   HENT:      Right Ear: Tympanic membrane normal.      Left Ear: Tympanic membrane normal.      Nose: Congestion and rhinorrhea present.      Mouth/Throat:      Pharynx: No oropharyngeal exudate or posterior oropharyngeal erythema.   Eyes:      Conjunctiva/sclera: Conjunctivae normal.      Pupils: Pupils are equal, round, and reactive to light.   Cardiovascular:      Rate and Rhythm: Normal rate and regular rhythm.   Pulmonary:      Effort: Pulmonary effort is normal.      Breath sounds: Normal breath sounds.   Lymphadenopathy:      Cervical: No cervical adenopathy.   Skin:     General: Skin is warm and dry.      Findings: No rash.   Neurological:      General: No focal deficit present.      Mental Status: She is alert.   Psychiatric:         Mood " and Affect: Mood normal.          DIAGNOSTICS  Results for orders placed or performed in visit on 02/01/25   Influenza A/B, RSV and SARS-CoV2 PCR (COVID-19) Nose     Status: Normal    Specimen: Nose; Swab   Result Value Ref Range    Influenza A PCR Negative Negative    Influenza B PCR Negative Negative    RSV PCR Negative Negative    SARS CoV2 PCR Negative Negative    Narrative    Testing was performed using the Xpert Xpress CoV2/Flu/RSV Assay on the [employer] Gene[employer]pert Instrument. This test should be ordered for the detection of SARS-CoV2, influenza, and RSV viruses in individuals with signs and symptoms of respiratory tract infection. This test is for in vitro diagnostic use under the [place] FDA for laboratories certified under CLIA to perform high or moderate complexity testing. This test has been [place] FDA cleared. A negative result does not rule out the presence of PCR inhibitors in the specimen or target RNA in concentration below the limit of detection for the assay. If only one viral target is positive but coinfection with multiple targets is suspected, the sample should be re-tested with another FDA cleared, approved, or authorized test, if coninfection would change clinical management. This test was validated by the [facility]. These laboratories are certified under the Clinical Laboratory Improvement Amendments of 1988 (CLIA-88) as qualified to perfom high complexity laboratory testing.        EVELIO Ybarra [facility]  Answers submitted by the patient for this visit:  Patient Health Questionnaire (Submitted on 2/1/2025)  If you checked off any problems, how difficult have these problems made it for you to do your work, take care of things at home, or get along with other people?: Not difficult at all  PHQ9 TOTAL SCORE: 0  Patient Health Questionnaire (G7) (Submitted on 2/1/2025)  NICHOLE 7 TOTAL SCORE: 9

## 2025-02-02 ASSESSMENT — ENCOUNTER SYMPTOMS
ABDOMINAL DISTENTION: 0
FEVER: 0
DIARRHEA: 0
NAUSEA: 0
VOMITING: 0
COUGH: 1
FATIGUE: 1
LIGHT-HEADEDNESS: 1
ACTIVITY CHANGE: 1
RHINORRHEA: 1
SORE THROAT: 0

## 2025-03-27 ENCOUNTER — PATIENT OUTREACH (OUTPATIENT)
Dept: CARE COORDINATION | Facility: CLINIC | Age: 35
End: 2025-03-27
Payer: COMMERCIAL

## 2025-06-01 ENCOUNTER — HEALTH MAINTENANCE LETTER (OUTPATIENT)
Age: 35
End: 2025-06-01

## 2025-07-09 ENCOUNTER — OFFICE VISIT (OUTPATIENT)
Dept: FAMILY MEDICINE | Facility: OTHER | Age: 35
End: 2025-07-09
Attending: NURSE PRACTITIONER
Payer: COMMERCIAL

## 2025-07-09 VITALS
BODY MASS INDEX: 30.54 KG/M2 | OXYGEN SATURATION: 98 % | WEIGHT: 167 LBS | DIASTOLIC BLOOD PRESSURE: 72 MMHG | RESPIRATION RATE: 18 BRPM | SYSTOLIC BLOOD PRESSURE: 130 MMHG | HEART RATE: 82 BPM | TEMPERATURE: 98.5 F

## 2025-07-09 DIAGNOSIS — H92.02 REFERRED EAR PAIN, LEFT: ICD-10-CM

## 2025-07-09 DIAGNOSIS — R68.84 JAW PAIN: Primary | ICD-10-CM

## 2025-07-09 PROCEDURE — 250N000009 HC RX 250: Performed by: NURSE PRACTITIONER

## 2025-07-09 PROCEDURE — G0463 HOSPITAL OUTPT CLINIC VISIT: HCPCS

## 2025-07-09 RX ORDER — DEXAMETHASONE SODIUM PHOSPHATE 4 MG/ML
12 VIAL (ML) INJECTION ONCE
Status: COMPLETED | OUTPATIENT
Start: 2025-07-09 | End: 2025-07-09

## 2025-07-09 RX ORDER — LISDEXAMFETAMINE DIMESYLATE 30 MG/1
30 CAPSULE ORAL EVERY MORNING
COMMUNITY
Start: 2025-07-01

## 2025-07-09 RX ORDER — LAMOTRIGINE 25 MG/1
1 TABLET ORAL DAILY
COMMUNITY
Start: 2025-05-06 | End: 2025-07-09 | Stop reason: ALTCHOICE

## 2025-07-09 RX ADMIN — DEXAMETHASONE SODIUM PHOSPHATE 12 MG: 4 INJECTION, SOLUTION INTRAMUSCULAR; INTRAVENOUS at 11:37

## 2025-07-09 ASSESSMENT — PAIN SCALES - GENERAL: PAINLEVEL_OUTOF10: MILD PAIN (3)

## 2025-07-09 NOTE — NURSING NOTE
"Chief Complaint   Patient presents with    Swelling     Left ear and face     Patient here with pain and swelling on left ear x2 days. States it hurts to close jaw. Patient has treated with Advil with last dose being last night.      Initial /72 (BP Location: Right arm, Patient Position: Sitting, Cuff Size: Adult Regular)   Pulse 82   Temp 98.5  F (36.9  C) (Temporal)   Resp 18   Wt 75.8 kg (167 lb)   SpO2 98%   BMI 30.54 kg/m   Estimated body mass index is 30.54 kg/m  as calculated from the following:    Height as of 2/1/25: 1.575 m (5' 2\").    Weight as of this encounter: 75.8 kg (167 lb).  Medication Review: complete    The next two questions are to help us understand your food security.  If you are feeling you need any assistance in this area, we have resources available to support you today.          7/9/2025   SDOH- Food Insecurity   Within the past 12 months, did you worry that your food would run out before you got money to buy more? N   Within the past 12 months, did the food you bought just not last and you didn t have money to get more? N         Health Care Directive:  Patient does not have a Health Care Directive: Discussed advance care planning with patient; however, patient declined at this time.    Yuki Barlow LPN 7/9/2025 11:13 AM        "

## 2025-07-09 NOTE — PROGRESS NOTES
ASSESSMENT/PLAN:     I have reviewed the nursing notes.  I have reviewed the findings, diagnosis, plan and need for follow up with the patient.        1. Referred ear pain, left  2. Jaw pain (Primary)  - dexAMETHasone (DECADRON) injectable solution used ORALLY 12 mg    Symptoms and exam consistent with either TMJ or mild parotid inflammation.  No fevers or indications of infection.  Decadron x 1 administered in clinic (opted not to use Prednisone due to anxiety/mental health)  Encouraged use of heat and heat along with gentle massage   May use over-the-counter Tylenol or ibuprofen PRN    Discussed warning signs/symptoms indicative of need to follow up including but not limited to: worsening pain or swelling, fever, difficulty opening mouth, difficulty chewing, etc   Follow up if symptoms persist or worsen or concerns      I explained my diagnostic considerations and recommendations to the patient, who voiced understanding and agreement with the treatment plan. All questions were answered. We discussed potential side effects of any prescribed or recommended therapies, as well as expectations for response to treatments.    Valeri Ackerman NP  [facility]      SUBJECTIVE:   Myrna Martinez is a 35 year old female who presents to clinic today for the following health issues:  Facial swelling    HPI  Left ear pain and clogged sensation for the past week.  Now with pain in left TMJ area/cheek and around the ear structure since yesterday.  Painful to chew and open/close mouth.  No dental pain.  No fevers.  No rash.  No sore throat.  Took ibuprofen 400 mg last night.          Past Medical History:   Diagnosis Date    ADHD (attention deficit hyperactivity disorder)     Depression     [facility], was homeless at the time    Headaches, cluster     Herpes genitalis in women     takes acyclovir    Papanicolaou smear of cervix with low grade squamous intraepithelial lesion (LGSIL)     Seasonal  allergies      Past Surgical History:   Procedure Laterality Date    APPENDECTOMY  01/01/1996    MAMMOPLASTY REDUCTION  01/01/2011    nasal polyp removal  03/2024    done at [facility] with Dr. Lares    TOOTH EXTRACTION       Social History     Tobacco Use    Smoking status: Never     Passive exposure: Past    Smokeless tobacco: Never   Substance Use Topics    Alcohol use: Yes     Alcohol/week: 0.0 standard drinks of alcohol     Comment: Alcoholic Drinks/day: 1 drink/month     Current Outpatient Medications   Medication Sig Dispense Refill    etonogestrel (IMPLANON/NEXPLANON) 68 MG IMPL 1 each (68 mg) by Subdermal route continuous  0    lisdexamfetamine (VYVANSE) 30 MG capsule Take 30 mg by mouth every morning.      valACYclovir (VALTREX) 500 MG tablet Take 1 tablet (500 mg) by mouth 2 times daily (Patient not taking: Reported on 7/9/2025) 10 tablet 2     No Known Allergies      Past medical history, past surgical history, current medications and allergies reviewed and accurate to the best of my knowledge.        OBJECTIVE:     /72 (BP Location: Right arm, Patient Position: Sitting, Cuff Size: Adult Regular)   Pulse 82   Temp 98.5  F (36.9  C) (Temporal)   Resp 18   Wt 75.8 kg (167 lb)   SpO2 98%   BMI 30.54 kg/m    Body mass index is 30.54 kg/m .      Physical Exam  General Appearance: Well appearing adult female, appropriate appearance for age. No acute distress  Ears: Left TM intact, no erythema, no effusion, no bulging, no purulence.   Left auditory canal clear without drainage or bleeding.  Normal external ears, non tender.  Eyes: conjunctivae normal without erythema or irritation, corneas clear, no drainage or crusting, no eyelid swelling, pupils equal   Orophayrnx: moist mucous membranes, pharynx without erythema, tonsils without hypertrophy, tonsils without erythema, no tonsillar exudates, no oral lesions, no palate petechiae, no post nasal drip seen, no trismus, voice clear.  Left TMJ and  parotid area with tenderness to palpation, no palpable swelling  Sinuses:  Left lateral maxillary sinus tenderness along the ear/jaw  Neck: supple without adenopathy  Respiratory: normal chest wall and respirations.  Normal effort.  No cough appreciated.  Cardiac: RRR with no murmurs  Musculoskeletal:  Equal movement of bilateral upper extremities.  Equal movement of bilateral lower extremities.  Normal gait.    Dermatological: no noted facial swelling or rash   Psychological: normal affect, alert, oriented, and pleasant.

## (undated) RX ORDER — DEXAMETHASONE SODIUM PHOSPHATE 4 MG/ML
INJECTION, SOLUTION INTRA-ARTICULAR; INTRALESIONAL; INTRAMUSCULAR; INTRAVENOUS; SOFT TISSUE
Status: DISPENSED
Start: 2023-03-01

## (undated) RX ORDER — DEXAMETHASONE SODIUM PHOSPHATE 4 MG/ML
INJECTION, SOLUTION INTRA-ARTICULAR; INTRALESIONAL; INTRAMUSCULAR; INTRAVENOUS; SOFT TISSUE
Status: DISPENSED
Start: 2025-07-09